# Patient Record
Sex: FEMALE | Race: WHITE | NOT HISPANIC OR LATINO | Employment: OTHER | URBAN - METROPOLITAN AREA
[De-identification: names, ages, dates, MRNs, and addresses within clinical notes are randomized per-mention and may not be internally consistent; named-entity substitution may affect disease eponyms.]

---

## 2017-01-03 ENCOUNTER — OFFICE VISIT (OUTPATIENT)
Dept: FAMILY MEDICINE | Facility: CLINIC | Age: 82
End: 2017-01-03
Payer: MEDICARE

## 2017-01-03 ENCOUNTER — LAB VISIT (OUTPATIENT)
Dept: LAB | Facility: HOSPITAL | Age: 82
End: 2017-01-03
Attending: FAMILY MEDICINE
Payer: MEDICARE

## 2017-01-03 VITALS
SYSTOLIC BLOOD PRESSURE: 136 MMHG | OXYGEN SATURATION: 95 % | BODY MASS INDEX: 20.52 KG/M2 | HEIGHT: 60 IN | WEIGHT: 104.5 LBS | HEART RATE: 68 BPM | DIASTOLIC BLOOD PRESSURE: 66 MMHG

## 2017-01-03 DIAGNOSIS — E78.5 DYSLIPIDEMIA: ICD-10-CM

## 2017-01-03 DIAGNOSIS — F03.90 DEMENTIA WITHOUT BEHAVIORAL DISTURBANCE, UNSPECIFIED DEMENTIA TYPE: ICD-10-CM

## 2017-01-03 DIAGNOSIS — E03.9 HYPOTHYROIDISM, UNSPECIFIED TYPE: ICD-10-CM

## 2017-01-03 DIAGNOSIS — Z23 NEED FOR INFLUENZA VACCINATION: ICD-10-CM

## 2017-01-03 DIAGNOSIS — F03.90 DEMENTIA WITHOUT BEHAVIORAL DISTURBANCE, UNSPECIFIED DEMENTIA TYPE: Primary | ICD-10-CM

## 2017-01-03 DIAGNOSIS — N39.0 URINARY TRACT INFECTION WITHOUT HEMATURIA, SITE UNSPECIFIED: ICD-10-CM

## 2017-01-03 DIAGNOSIS — Z23 NEED FOR VACCINATION AGAINST STREPTOCOCCUS PNEUMONIAE: ICD-10-CM

## 2017-01-03 LAB
ALBUMIN SERPL BCP-MCNC: 3.8 G/DL
ALP SERPL-CCNC: 59 U/L
ALT SERPL W/O P-5'-P-CCNC: 7 U/L
ANION GAP SERPL CALC-SCNC: 9 MMOL/L
AST SERPL-CCNC: 18 U/L
BILIRUB SERPL-MCNC: 0.5 MG/DL
BUN SERPL-MCNC: 18 MG/DL
CALCIUM SERPL-MCNC: 9.1 MG/DL
CHLORIDE SERPL-SCNC: 101 MMOL/L
CHOLEST/HDLC SERPL: 2.5 {RATIO}
CO2 SERPL-SCNC: 25 MMOL/L
CREAT SERPL-MCNC: 0.9 MG/DL
EST. GFR  (AFRICAN AMERICAN): >60 ML/MIN/1.73 M^2
EST. GFR  (NON AFRICAN AMERICAN): 54.9 ML/MIN/1.73 M^2
GLUCOSE SERPL-MCNC: 116 MG/DL
HDL/CHOLESTEROL RATIO: 40.5 %
HDLC SERPL-MCNC: 153 MG/DL
HDLC SERPL-MCNC: 62 MG/DL
LDLC SERPL CALC-MCNC: 71.6 MG/DL
NONHDLC SERPL-MCNC: 91 MG/DL
POTASSIUM SERPL-SCNC: 4.3 MMOL/L
PROT SERPL-MCNC: 7.7 G/DL
SODIUM SERPL-SCNC: 135 MMOL/L
TRIGL SERPL-MCNC: 97 MG/DL
TSH SERPL DL<=0.005 MIU/L-ACNC: 1.9 UIU/ML

## 2017-01-03 PROCEDURE — 84443 ASSAY THYROID STIM HORMONE: CPT

## 2017-01-03 PROCEDURE — 99999 PR PBB SHADOW E&M-EST. PATIENT-LVL III: CPT | Mod: PBBFAC,,, | Performed by: FAMILY MEDICINE

## 2017-01-03 PROCEDURE — 80061 LIPID PANEL: CPT

## 2017-01-03 PROCEDURE — 99214 OFFICE O/P EST MOD 30 MIN: CPT | Mod: S$PBB,,, | Performed by: FAMILY MEDICINE

## 2017-01-03 PROCEDURE — 80053 COMPREHEN METABOLIC PANEL: CPT

## 2017-01-03 PROCEDURE — 36415 COLL VENOUS BLD VENIPUNCTURE: CPT | Mod: PO

## 2017-01-03 RX ORDER — CIPROFLOXACIN 500 MG/1
500 TABLET ORAL 2 TIMES DAILY
Qty: 6 TABLET | Refills: 0 | Status: SHIPPED | OUTPATIENT
Start: 2017-01-03 | End: 2017-01-06

## 2017-01-03 NOTE — PROGRESS NOTES
Subjective:       Patient ID: Sue Capone is a 89 y.o. female.    Chief Complaint: Follow-up    HPI Comments: 89 years old female who came to the clinic for dementia follow-up.  Patient son denies behavioral symptoms.  Patient was recently evaluated at the emergency room secondary to a fall.  Patient is doing better.  Head CT scan was normal.  Urine with evidence of mild infection.  Patient due for her vaccinations.    Review of Systems   Constitutional: Negative.  Negative for chills and fever.   HENT: Negative.    Eyes: Negative.    Respiratory: Negative.    Cardiovascular: Negative.    Gastrointestinal: Negative.    Genitourinary: Negative.  Negative for difficulty urinating, dysuria, flank pain, hematuria and urgency.   Musculoskeletal: Negative.    Skin: Negative.    Neurological: Negative.    Psychiatric/Behavioral: Negative.        Objective:      Physical Exam   Constitutional: She is oriented to person, place, and time. She appears well-developed and well-nourished. No distress.   HENT:   Head: Normocephalic and atraumatic.   Right Ear: External ear normal.   Left Ear: External ear normal.   Nose: Nose normal.   Mouth/Throat: Oropharynx is clear and moist. No oropharyngeal exudate.   Eyes: Conjunctivae and EOM are normal. Pupils are equal, round, and reactive to light. Right eye exhibits no discharge. Left eye exhibits no discharge. No scleral icterus.   Neck: Normal range of motion. Neck supple. No JVD present. No tracheal deviation present. No thyromegaly present.   Cardiovascular: Normal rate, regular rhythm, normal heart sounds and intact distal pulses.  Exam reveals no gallop and no friction rub.    No murmur heard.  Pulmonary/Chest: Effort normal and breath sounds normal. No stridor. No respiratory distress. She has no wheezes. She has no rales. She exhibits no tenderness.   Abdominal: Soft. Bowel sounds are normal. She exhibits no distension and no mass. There is no tenderness. There is no rebound  and no guarding.   Musculoskeletal: Normal range of motion. She exhibits no edema or tenderness.   Lymphadenopathy:     She has no cervical adenopathy.   Neurological: She is alert and oriented to person, place, and time. She has normal reflexes. No cranial nerve deficit. She exhibits normal muscle tone. Coordination and gait abnormal.   Skin: Skin is warm and dry. No rash noted. She is not diaphoretic. No erythema. No pallor.   Psychiatric: She has a normal mood and affect. Judgment and thought content normal. Her mood appears not anxious. Her affect is not angry, not blunt, not labile and not inappropriate. She is slowed. Cognition and memory are impaired. She does not exhibit a depressed mood. She is noncommunicative. She exhibits abnormal recent memory. She exhibits normal remote memory.       Assessment:       1. Dementia without behavioral disturbance, unspecified dementia type    2. Urinary tract infection without hematuria, site unspecified    3. Hypothyroidism, unspecified type    4. Dyslipidemia    5. Need for influenza vaccination    6. Need for vaccination against Streptococcus pneumoniae        Plan:         Sue was seen today for follow-up.    Diagnoses and all orders for this visit:    Dementia without behavioral disturbance, unspecified dementia type  -     TSH; Future  -     Comprehensive metabolic panel; Future    Urinary tract infection without hematuria, site unspecified  -     ciprofloxacin HCl (CIPRO) 500 MG tablet; Take 1 tablet (500 mg total) by mouth 2 (two) times daily.    Hypothyroidism, unspecified type  -     TSH; Future  -     Comprehensive metabolic panel; Future  -     Lipid panel; Future    Dyslipidemia  -     TSH; Future  -     Comprehensive metabolic panel; Future  -     Lipid panel; Future    Need for influenza vaccination  -     Influenza - High Dose (65+) (PF) (IM)    Need for vaccination against Streptococcus pneumoniae  -     Pneumococcal Conjugate Vaccine (13 Valent)  (IM)

## 2017-01-03 NOTE — MR AVS SNAPSHOT
[facility]   [place]  [place] [address]  Phone: [phone]  Fax: [phone]                  Sue Capone   1/3/2017 9:00 AM   Office Visit    Description:  Female : 1922   Provider:  Collins Willams MD   Department:  [facility]           Reason for Visit     Follow-up           Diagnoses this Visit        Comments    Dementia without behavioral disturbance, unspecified dementia type    -  Primary     Urinary tract infection without hematuria, site unspecified         Hypothyroidism, unspecified type         Dyslipidemia         Need for influenza vaccination         Need for vaccination against Streptococcus pneumoniae                To Do List           Future Appointments        Provider Department Dept Phone    1/3/2017 9:45 AM LAB, [facility] [phone]    2017 11:00 AM Joey Benoit DPM [facility] [phone]      Goals (5 Years of Data)     None      Follow-Up and Disposition     Return in about 6 months (around 7/3/2017), or if symptoms worsen or fail to improve.       These Medications        Disp Refills Start End    ciprofloxacin HCl (CIPRO) 500 MG tablet 6 tablet 0 1/3/2017 2017    Take 1 tablet (500 mg total) by mouth 2 (two) times daily. - Oral    Pharmacy: [id number] [address] AT [facility] [address] Ph #: [phone]         [facility]     [facility] Nurse Care Line -  Assistance  Registered nurses in the [facility] provide clinical advisement, health education, appointment booking, and other advisory services.  Call for this free service at [phone].             Medications           Message regarding Medications     Verify the changes and/or additions to your medication regime listed below are the same as discussed with your clinician today.  If any of these changes or additions are incorrect,  please notify your healthcare provider.        START taking these NEW medications        Refills    ciprofloxacin HCl (CIPRO) 500 MG tablet 0    Sig: Take 1 tablet (500 mg total) by mouth 2 (two) times daily.    Class: Normal    Route: Oral           Verify that the below list of medications is an accurate representation of the medications you are currently taking.  If none reported, the list may be blank. If incorrect, please contact your healthcare provider. Carry this list with you in case of emergency.           Current Medications     aspirin (ECOTRIN) 81 MG EC tablet Take 81 mg by mouth once daily.    levothyroxine (SYNTHROID) 75 MCG tablet Take 1 tablet (75 mcg total) by mouth once daily.    pravastatin (PRAVACHOL) 20 MG tablet Take 1 tablet (20 mg total) by mouth once daily.    ciprofloxacin HCl (CIPRO) 500 MG tablet Take 1 tablet (500 mg total) by mouth 2 (two) times daily.           Clinical Reference Information           Vital Signs - Last Recorded  Most recent update: 1/3/2017  9:07 AM by Rosemary Brasher MA    BP Pulse Ht Wt SpO2 BMI    136/66 (BP Location: Left arm, Patient Position: Sitting, BP Method: Manual) 68 5' (1.524 m) 47.4 kg (104 lb 8 oz) 95% 20.41 kg/m2      Blood Pressure          Most Recent Value    BP  136/66      Allergies as of 1/3/2017     Codeine    Penicillins      Immunizations Administered on Date of Encounter - 1/3/2017     Name Date Dose VIS Date Route    Influenza - High Dose  Incomplete 0.5 mL 8/7/2015 Intramuscular    Pneumococcal Conjugate - 13 Valent  Incomplete 0.5 mL 11/5/2015 Intramuscular      Orders Placed During Today's Visit      Normal Orders This Visit    Influenza - High Dose (65+) (PF) (IM)     Pneumococcal Conjugate Vaccine (13 Valent) (IM)     Future Labs/Procedures Expected by Expires    Comprehensive metabolic panel  1/3/2017 4/3/2017    Lipid panel  1/3/2017 4/3/2017    TSH  1/3/2017 4/3/2017

## 2017-01-09 ENCOUNTER — OFFICE VISIT (OUTPATIENT)
Dept: FAMILY MEDICINE | Facility: CLINIC | Age: 82
End: 2017-01-09
Payer: MEDICARE

## 2017-01-09 ENCOUNTER — OFFICE VISIT (OUTPATIENT)
Dept: PODIATRY | Facility: CLINIC | Age: 82
End: 2017-01-09
Payer: MEDICARE

## 2017-01-09 VITALS
BODY MASS INDEX: 20.42 KG/M2 | HEART RATE: 60 BPM | HEIGHT: 60 IN | WEIGHT: 104 LBS | SYSTOLIC BLOOD PRESSURE: 154 MMHG | DIASTOLIC BLOOD PRESSURE: 64 MMHG

## 2017-01-09 VITALS
WEIGHT: 104 LBS | HEIGHT: 60 IN | HEART RATE: 70 BPM | BODY MASS INDEX: 20.42 KG/M2 | SYSTOLIC BLOOD PRESSURE: 125 MMHG | DIASTOLIC BLOOD PRESSURE: 58 MMHG

## 2017-01-09 DIAGNOSIS — F03.90 DEMENTIA WITHOUT BEHAVIORAL DISTURBANCE, UNSPECIFIED DEMENTIA TYPE: Primary | ICD-10-CM

## 2017-01-09 DIAGNOSIS — B35.1 ONYCHOMYCOSIS: ICD-10-CM

## 2017-01-09 DIAGNOSIS — N39.0 URINARY TRACT INFECTION WITHOUT HEMATURIA, SITE UNSPECIFIED: ICD-10-CM

## 2017-01-09 DIAGNOSIS — J40 BRONCHITIS: ICD-10-CM

## 2017-01-09 DIAGNOSIS — F03.90 DEMENTIA WITHOUT BEHAVIORAL DISTURBANCE, UNSPECIFIED DEMENTIA TYPE: ICD-10-CM

## 2017-01-09 DIAGNOSIS — I73.9 PAD (PERIPHERAL ARTERY DISEASE): Primary | ICD-10-CM

## 2017-01-09 PROCEDURE — 99213 OFFICE O/P EST LOW 20 MIN: CPT | Mod: PBBFAC,PO | Performed by: FAMILY MEDICINE

## 2017-01-09 PROCEDURE — 11721 DEBRIDE NAIL 6 OR MORE: CPT | Mod: S$PBB,Q8,, | Performed by: PODIATRIST

## 2017-01-09 PROCEDURE — 87086 URINE CULTURE/COLONY COUNT: CPT

## 2017-01-09 PROCEDURE — 99499 UNLISTED E&M SERVICE: CPT | Mod: S$PBB,,, | Performed by: PODIATRIST

## 2017-01-09 PROCEDURE — 99214 OFFICE O/P EST MOD 30 MIN: CPT | Mod: S$PBB,,, | Performed by: FAMILY MEDICINE

## 2017-01-09 PROCEDURE — 81001 URINALYSIS AUTO W/SCOPE: CPT

## 2017-01-09 PROCEDURE — 99999 PR PBB SHADOW E&M-EST. PATIENT-LVL II: CPT | Mod: PBBFAC,,, | Performed by: PODIATRIST

## 2017-01-09 PROCEDURE — 99999 PR PBB SHADOW E&M-EST. PATIENT-LVL III: CPT | Mod: PBBFAC,,, | Performed by: FAMILY MEDICINE

## 2017-01-09 RX ORDER — LEVOFLOXACIN 250 MG/1
250 TABLET ORAL DAILY
Qty: 10 TABLET | Refills: 0 | Status: SHIPPED | OUTPATIENT
Start: 2017-01-09 | End: 2017-01-10

## 2017-01-09 NOTE — MR AVS SNAPSHOT
[facility]   [place]  [place] [address]  Phone: [phone]  Fax: [phone]                  Sue Capone   2017 6:00 PM   Office Visit    Description:  Female : 1922   Provider:  Collins Willams MD   Department:  [facility]           Reason for Visit     Urinary Tract Infection           Diagnoses this Visit        Comments    Dementia without behavioral disturbance, unspecified dementia type    -  Primary     Urinary tract infection without hematuria, site unspecified         Bronchitis                To Do List           Future Appointments        Provider Department Dept Phone    1/10/2017 11:00 AM K[facility] XR1 300 LB LIMIT [facility] [phone]    1/10/2017 11:30 AM LAB, [facility] [phone]    4/10/2017 11:00 AM Joey Benoit, [facility] [phone]      Goals (5 Years of Data)     None       These Medications        Disp Refills Start End    levoFLOXacin (LEVAQUIN) 250 MG tablet 10 tablet 0 2017    Take 1 tablet (250 mg total) by mouth once daily. - Oral    Pharmacy: [facility] [address] [place] [address] [address] AT [facility] Ph #: [phone]         [facility]     [facility] - / Assistance  Registered nurses in the [facility] provide clinical advisement, health education, appointment booking, and other advisory services.  Call for this free service at [phone].             Medications           Message regarding Medications     Verify the changes and/or additions to your medication regime listed below are the same as discussed with your clinician today.  If any of these changes or additions are incorrect, please notify your healthcare provider.        START taking these NEW medications        Refills    levoFLOXacin (LEVAQUIN) 250 MG tablet 0    Sig: Take 1 tablet (250 mg  total) by mouth once daily.    Class: Normal    Route: Oral           Verify that the below list of medications is an accurate representation of the medications you are currently taking.  If none reported, the list may be blank. If incorrect, please contact your healthcare provider. Carry this list with you in case of emergency.           Current Medications     aspirin (ECOTRIN) 81 MG EC tablet Take 81 mg by mouth once daily.    levothyroxine (SYNTHROID) 75 MCG tablet Take 1 tablet (75 mcg total) by mouth once daily.    pravastatin (PRAVACHOL) 20 MG tablet Take 1 tablet (20 mg total) by mouth once daily.    levoFLOXacin (LEVAQUIN) 250 MG tablet Take 1 tablet (250 mg total) by mouth once daily.           Clinical Reference Information           Vital Signs - Last Recorded  Most recent update: 1/9/2017  5:05 PM by Faith Worthington MA    BP Pulse Ht Wt BMI    (!) 154/64 (BP Location: Left arm, Patient Position: Sitting, BP Method: Manual) 60 5' (1.524 m) 47.2 kg (104 lb) 20.31 kg/m2      Blood Pressure          Most Recent Value    BP  (!)  154/64      Allergies as of 1/9/2017     Codeine    Penicillins      Immunizations Administered on Date of Encounter - 1/9/2017     None      Orders Placed During Today's Visit      Normal Orders This Visit    Urinalysis     Urine culture     Future Labs/Procedures Expected by Expires    Basic metabolic panel  1/9/2017 4/9/2017    CBC auto differential  1/9/2017 1/9/2018    X-Ray Chest PA And Lateral  1/9/2017 1/9/2018

## 2017-01-09 NOTE — PROGRESS NOTES
Subjective:       Patient ID: Sue Capone is a 89 y.o. female.    Chief Complaint: Urinary Tract Infection    HPI Comments: 89 years old female who came to the clinic with possible urinary tract infection.  Patient with impaired mental status or worsening of her dementia for the last week.  Patient was sleeping most of the day.  No urinary symptoms.  No fever or chills.    Urinary Tract Infection    Pertinent negatives include no chills, flank pain, hematuria or urgency.     Review of Systems   Constitutional: Negative.  Negative for chills and fever.   HENT: Negative.    Eyes: Negative.    Respiratory: Positive for cough.    Cardiovascular: Negative.    Gastrointestinal: Negative.    Genitourinary: Negative.  Negative for dysuria, flank pain, hematuria and urgency.   Musculoskeletal: Negative.    Skin: Negative.    Neurological: Negative.    Psychiatric/Behavioral: Positive for decreased concentration.       Objective:      Physical Exam   Constitutional: She is oriented to person, place, and time. She appears well-developed and well-nourished. No distress.   HENT:   Head: Normocephalic and atraumatic.   Right Ear: External ear normal.   Left Ear: External ear normal.   Nose: Nose normal.   Mouth/Throat: Oropharynx is clear and moist. No oropharyngeal exudate.   Eyes: Conjunctivae and EOM are normal. Pupils are equal, round, and reactive to light. Right eye exhibits no discharge. Left eye exhibits no discharge. No scleral icterus.   Neck: Normal range of motion. Neck supple. No JVD present. No tracheal deviation present. No thyromegaly present.   Cardiovascular: Normal rate, regular rhythm, normal heart sounds and intact distal pulses.  Exam reveals no gallop and no friction rub.    No murmur heard.  Pulmonary/Chest: Effort normal. No stridor. No respiratory distress. She has no wheezes. She has rhonchi in the right middle field. She has no rales. She exhibits no tenderness.   Abdominal: Soft. Bowel sounds are  normal. She exhibits no distension and no mass. There is no tenderness. There is no rebound and no guarding.   Musculoskeletal: Normal range of motion. She exhibits no edema or tenderness.   Lymphadenopathy:     She has no cervical adenopathy.   Neurological: She is alert and oriented to person, place, and time. She has normal reflexes. No cranial nerve deficit. She exhibits normal muscle tone. Coordination and gait abnormal.   Skin: Skin is warm and dry. No rash noted. She is not diaphoretic. No erythema. No pallor.   Psychiatric: She has a normal mood and affect. Judgment and thought content normal. She is slowed. Cognition and memory are impaired. She is noncommunicative. She exhibits abnormal recent memory. She exhibits normal remote memory.       Assessment:       1. Dementia without behavioral disturbance, unspecified dementia type    2. Urinary tract infection without hematuria, site unspecified    3. Bronchitis        Plan:         Sue was seen today for urinary tract infection.    Diagnoses and all orders for this visit:    Dementia without behavioral disturbance, unspecified dementia type  -     Basic metabolic panel; Future  -     CBC auto differential; Future    Urinary tract infection without hematuria, site unspecified  -     X-Ray Chest PA And Lateral; Future  -     levoFLOXacin (LEVAQUIN) 250 MG tablet; Take 1 tablet (250 mg total) by mouth once daily.  -     Urinalysis  -     Urine culture  -     Basic metabolic panel; Future  -     CBC auto differential; Future    Bronchitis  -     X-Ray Chest PA And Lateral; Future  -     Basic metabolic panel; Future  -     CBC auto differential; Future

## 2017-01-09 NOTE — MR AVS SNAPSHOT
[facility]   [place]  [place] [address]  Phone: [phone]                  Sue Capone   2017 11:00 AM   Office Visit    Description:  Female : 1922   Provider:  Joey Benoit DPM   Department:  [facility]           Reason for Visit     Nail Care           Diagnoses this Visit        Comments    PAD (peripheral artery disease)    -  Primary     Onychomycosis         Dementia without behavioral disturbance, unspecified dementia type                To Do List           Future Appointments        Provider Department Dept Phone    2017 6:00 PM Collins Willams MD [facility] [phone]    4/10/2017 11:00 AM Joey Benoit DPM [facility] [phone]      Goals (5 Years of Data)     None      Follow-Up and Disposition     Return in about 3 months (around 2017).      [facility] On [facility]     [facility] Nurse Care Line -  Assistance  Registered nurses in the [facility] provide clinical advisement, health education, appointment booking, and other advisory services.  Call for this free service at [phone].             Medications           Message regarding Medications     Verify the changes and/or additions to your medication regime listed below are the same as discussed with your clinician today.  If any of these changes or additions are incorrect, please notify your healthcare provider.             Verify that the below list of medications is an accurate representation of the medications you are currently taking.  If none reported, the list may be blank. If incorrect, please contact your healthcare provider. Carry this list with you in case of emergency.           Current Medications     aspirin (ECOTRIN) 81 MG EC tablet Take 81 mg by mouth once daily.    levothyroxine (SYNTHROID) 75 MCG tablet Take 1 tablet (75 mcg total) by mouth once daily.    pravastatin (PRAVACHOL) 20 MG tablet Take 1 tablet  (20 mg total) by mouth once daily.           Clinical Reference Information           Vital Signs - Last Recorded  Most recent update: 1/9/2017 11:00 AM by Grazyna Alberto MA    BP Pulse Ht Wt BMI    (!) 125/58 70 5' (1.524 m) 47.2 kg (104 lb) 20.31 kg/m2      Blood Pressure          Most Recent Value    BP  (!)  125/58      Allergies as of 1/9/2017     Codeine    Penicillins      Immunizations Administered on Date of Encounter - 1/9/2017     None

## 2017-01-10 ENCOUNTER — HOSPITAL ENCOUNTER (OUTPATIENT)
Dept: RADIOLOGY | Facility: HOSPITAL | Age: 82
Discharge: HOME | End: 2017-01-10
Attending: FAMILY MEDICINE
Payer: MEDICARE

## 2017-01-10 ENCOUNTER — TELEPHONE (OUTPATIENT)
Dept: INTERNAL MEDICINE | Facility: CLINIC | Age: 82
End: 2017-01-10

## 2017-01-10 DIAGNOSIS — J40 BRONCHITIS: ICD-10-CM

## 2017-01-10 DIAGNOSIS — N39.0 URINARY TRACT INFECTION WITHOUT HEMATURIA, SITE UNSPECIFIED: ICD-10-CM

## 2017-01-10 LAB
BACTERIA #/AREA URNS AUTO: ABNORMAL /HPF
BILIRUB UR QL STRIP: NEGATIVE
CAOX CRY UR QL COMP ASSIST: ABNORMAL
CLARITY UR REFRACT.AUTO: ABNORMAL
COLOR UR AUTO: YELLOW
GLUCOSE UR QL STRIP: NEGATIVE
HGB UR QL STRIP: NEGATIVE
KETONES UR QL STRIP: NEGATIVE
LEUKOCYTE ESTERASE UR QL STRIP: ABNORMAL
MICROSCOPIC COMMENT: ABNORMAL
NITRITE UR QL STRIP: NEGATIVE
NON-SQ EPI CELLS #/AREA URNS AUTO: 2 /HPF
PH UR STRIP: 6 [PH] (ref 5–8)
PROT UR QL STRIP: ABNORMAL
SP GR UR STRIP: 1.02 (ref 1–1.03)
SQUAMOUS #/AREA URNS AUTO: 2 /HPF
URN SPEC COLLECT METH UR: ABNORMAL
UROBILINOGEN UR STRIP-ACNC: NEGATIVE EU/DL
WBC #/AREA URNS AUTO: 5 /HPF (ref 0–5)

## 2017-01-10 PROCEDURE — 71010 XR CHEST 1 VIEW: CPT | Mod: 26,,, | Performed by: RADIOLOGY

## 2017-01-10 PROCEDURE — 71010 XR CHEST 1 VIEW: CPT | Mod: TC,PO

## 2017-01-10 NOTE — TELEPHONE ENCOUNTER
Chest x-ray with possible pneumonia versus CONGESTIVE heart failure.  I recommend evaluation at the emergency room today for possible treatment for congestive heart failure.  Please notify her Son.

## 2017-01-10 NOTE — PROGRESS NOTES
Subjective:      Patient ID: Sue Capone is a 89 y.o. female.    Chief Complaint: Nail Care    Patient who is hard of hearing with dementia presents to clinic with the complaint of toenails that are in need of trimming.  Accompanied by son who relates all pertinent information. No new complaints. No longer taking Metformin and is not considered diabetic any longer. No new complaints. History of a fall 11/28/16 treated at [facility] ED with a boot. Xray reviewed fracture of right second and third toes. Questionable right ankle injury assessed per [facility] and found to not require orthopedic boot.    Vitals:    01/09/17 1058   BP: (!) 125/58   Pulse: 70     Past Medical History   Diagnosis Date    Cancer     Diabetes mellitus type II     Hypertension     Thyroid disease        Past Surgical History   Procedure Laterality Date    Hysterectomy      Breast surgery      Shoulder surgery       left    Cholecystectomy         No family history on file.    Social History     Social History    Marital status:      Spouse name: N/A    Number of children: N/A    Years of education: N/A     Social History Main Topics    Smoking status: Former Smoker    Smokeless tobacco: Never Used    Alcohol use No    Drug use: Not on file    Sexual activity: Not on file     Other Topics Concern    Not on file     Social History Narrative       Current Outpatient Prescriptions   Medication Sig Dispense Refill    aspirin (ECOTRIN) 81 MG EC tablet Take 81 mg by mouth once daily.      levothyroxine (SYNTHROID) 75 MCG tablet Take 1 tablet (75 mcg total) by mouth once daily. 30 tablet 11    pravastatin (PRAVACHOL) 20 MG tablet Take 1 tablet (20 mg total) by mouth once daily. 30 tablet 11    levoFLOXacin (LEVAQUIN) 250 MG tablet Take 1 tablet (250 mg total) by mouth once daily. 10 tablet 0     No current facility-administered medications for this visit.        Review of patient's allergies indicates:   Allergen  Reactions    Codeine     Penicillins        Review of Systems   Unable to perform ROS: dementia   HENT: Positive for hearing loss.    Skin: Positive for nail changes.   Psychiatric/Behavioral: Positive for memory loss.           Objective:      Physical Exam   Constitutional: No distress.   Cardiovascular: Intact distal pulses.    Pulses:       Dorsalis pedis pulses are 1+ on the right side, and 1+ on the left side.        Posterior tibial pulses are 1+ on the right side, and 1+ on the left side.   CFT< 3 secs all toes bilateral foot, skin temp warm to cool bilateral foot, no digital hair growth bilateral foot, mild non-pitting lower extremity edema with varicosities bilateral.       Musculoskeletal:   Semi-reducible hammertoe deformities toes 2-5 bilateral foot. Pes planus foot type with hypermobile 1st ray b/l.    Neurological: She is alert.   Skin: Skin is warm, dry and intact. No abrasion, no ecchymosis and no rash noted. She is not diaphoretic. No cyanosis or erythema. No pallor. Nails show no clubbing.   Nails 1-5 bilateral are elongated, yellow and thickened. Toenails 3-5 left are also dystrophic with subungal debris. Rubor on dependency of both feet.    Skin is dry bilateral foot.    No open lesions or macerations bilateral lower extremity.               Assessment:       Encounter Diagnoses   Name Primary?    PAD (peripheral artery disease) Yes    Onychomycosis     Dementia without behavioral disturbance, unspecified dementia type          Plan:       Sue was seen today for nail care.    Diagnoses and all orders for this visit:    PAD (peripheral artery disease)    Onychomycosis    Dementia without behavioral disturbance, unspecified dementia type      I counseled the patient on her conditions, their implications and medical management.    Shoe inspection. Patient instructed on proper foot hygeine. We discussed wearing proper shoe gear, daily foot inspections, never walking without protective shoe  gear, never putting sharp instruments to feet, routine podiatric nail visits every 2-3 months.      With patient's permission, nails were aggressively reduced and debrided x 10 to their soft tissue attachment mechanically and with electric , removing all offending nail and debris. Patient relates relief following the procedure. She will continue to monitor the areas daily, inspect her feet, wear protective shoe gear when ambulatory, moisturizer to maintain skin integrity and follow in this office in approximately 2-3 months, sooner p.r.n.    Continue comfortable shoes. Currently wearing SAS which are acceptable.     RTC 3-4 months or prn.

## 2017-01-10 NOTE — TELEPHONE ENCOUNTER
Son informed moms cxr shows possible pneumonia vs congestive heart failure, dr zee recommends him bring her to er for evaluation and treatment, verb understanding

## 2017-01-11 ENCOUNTER — HOSPITAL ENCOUNTER (INPATIENT)
Facility: HOSPITAL | Age: 82
LOS: 12 days | Discharge: HOSPICE | DRG: 100 | End: 2017-01-23
Attending: EMERGENCY MEDICINE | Admitting: INTERNAL MEDICINE
Payer: MEDICARE

## 2017-01-11 DIAGNOSIS — R56.9 SEIZURE: ICD-10-CM

## 2017-01-11 DIAGNOSIS — I48.0 PAROXYSMAL ATRIAL FIBRILLATION: ICD-10-CM

## 2017-01-11 DIAGNOSIS — R41.82 ALTERED MENTAL STATUS, UNSPECIFIED ALTERED MENTAL STATUS TYPE: ICD-10-CM

## 2017-01-11 DIAGNOSIS — J69.0 ASPIRATION PNEUMONIA OF LEFT LOWER LOBE DUE TO GASTRIC SECRETIONS: ICD-10-CM

## 2017-01-11 DIAGNOSIS — E87.1 HYPONATREMIA: ICD-10-CM

## 2017-01-11 DIAGNOSIS — G40.319 GENERALIZED CONVULSIVE EPILEPSY WITH INTRACTABLE EPILEPSY: ICD-10-CM

## 2017-01-11 DIAGNOSIS — F03.90 DEMENTIA WITHOUT BEHAVIORAL DISTURBANCE, UNSPECIFIED DEMENTIA TYPE: ICD-10-CM

## 2017-01-11 DIAGNOSIS — R56.9 SEIZURES: Primary | ICD-10-CM

## 2017-01-11 DIAGNOSIS — J69.0 ASPIRATION PNEUMONIA OF LEFT LOWER LOBE, UNSPECIFIED ASPIRATION PNEUMONIA TYPE: ICD-10-CM

## 2017-01-11 DIAGNOSIS — J18.9 CAP (COMMUNITY ACQUIRED PNEUMONIA): ICD-10-CM

## 2017-01-11 DIAGNOSIS — Z86.39 HISTORY OF DIABETES MELLITUS, TYPE II: ICD-10-CM

## 2017-01-11 LAB
ALBUMIN SERPL BCP-MCNC: 3.6 G/DL
ALP SERPL-CCNC: 59 U/L
ALT SERPL W/O P-5'-P-CCNC: 13 U/L
ANION GAP SERPL CALC-SCNC: 10 MMOL/L
AST SERPL-CCNC: 28 U/L
BACTERIA UR CULT: NORMAL
BASOPHILS # BLD AUTO: 0.05 K/UL
BASOPHILS NFR BLD: 0.3 %
BILIRUB SERPL-MCNC: 0.6 MG/DL
BNP SERPL-MCNC: 227 PG/ML
BUN SERPL-MCNC: 19 MG/DL
CALCIUM SERPL-MCNC: 8.5 MG/DL
CHLORIDE SERPL-SCNC: 103 MMOL/L
CK SERPL-CCNC: 179 U/L
CLARITY CSF: CLEAR
CO2 SERPL-SCNC: 20 MMOL/L
COLOR CSF: COLORLESS
CREAT SERPL-MCNC: 0.8 MG/DL
DIFFERENTIAL METHOD: ABNORMAL
EOSINOPHIL # BLD AUTO: 0.1 K/UL
EOSINOPHIL NFR BLD: 0.7 %
EOSINOPHIL NFR CSF MANUAL: 2 %
ERYTHROCYTE [DISTWIDTH] IN BLOOD BY AUTOMATED COUNT: 14.8 %
EST. GFR  (AFRICAN AMERICAN): >60 ML/MIN/1.73 M^2
EST. GFR  (NON AFRICAN AMERICAN): >60 ML/MIN/1.73 M^2
FERRITIN SERPL-MCNC: 115 NG/ML
GLUCOSE CSF-MCNC: 69 MG/DL
GLUCOSE CSF-MCNC: 69 MG/DL
GLUCOSE SERPL-MCNC: 122 MG/DL
HCT VFR BLD AUTO: 33.5 %
HGB BLD-MCNC: 11.2 G/DL
LYMPHOCYTES # BLD AUTO: 2.4 K/UL
LYMPHOCYTES NFR BLD: 16 %
LYMPHOCYTES NFR CSF MANUAL: 53 %
MAGNESIUM SERPL-MCNC: 1.9 MG/DL
MCH RBC QN AUTO: 27.7 PG
MCHC RBC AUTO-ENTMCNC: 33.4 %
MCV RBC AUTO: 83 FL
MONOCYTES # BLD AUTO: 1.4 K/UL
MONOCYTES NFR BLD: 8.9 %
MONOS+MACROS NFR CSF MANUAL: 3 %
NEUTROPHILS # BLD AUTO: 11.2 K/UL
NEUTROPHILS NFR BLD: 73.7 %
NEUTROPHILS NFR CSF MANUAL: 42 %
PLATELET # BLD AUTO: 354 K/UL
PMV BLD AUTO: 10.7 FL
POCT GLUCOSE: 145 MG/DL (ref 70–110)
POTASSIUM SERPL-SCNC: 4 MMOL/L
PROLACTIN SERPL IA-MCNC: 29.4 NG/ML
PROT CSF-MCNC: 53 MG/DL
PROT CSF-MCNC: 53 MG/DL
PROT SERPL-MCNC: 6.8 G/DL
RBC # BLD AUTO: 4.04 M/UL
RBC # CSF: 221 /CU MM
SODIUM SERPL-SCNC: 133 MMOL/L
SPECIMEN VOL CSF: 3 ML
T4 FREE SERPL-MCNC: 1.18 NG/DL
TROPONIN I SERPL DL<=0.01 NG/ML-MCNC: 0.01 NG/ML
TSH SERPL DL<=0.005 MIU/L-ACNC: 2.22 UIU/ML
WBC # BLD AUTO: 15.2 K/UL
WBC # CSF: 6 /CU MM

## 2017-01-11 PROCEDURE — 89051 BODY FLUID CELL COUNT: CPT

## 2017-01-11 PROCEDURE — 83540 ASSAY OF IRON: CPT

## 2017-01-11 PROCEDURE — 87205 SMEAR GRAM STAIN: CPT | Mod: 91

## 2017-01-11 PROCEDURE — 84146 ASSAY OF PROLACTIN: CPT

## 2017-01-11 PROCEDURE — 36415 COLL VENOUS BLD VENIPUNCTURE: CPT

## 2017-01-11 PROCEDURE — 82550 ASSAY OF CK (CPK): CPT

## 2017-01-11 PROCEDURE — 11000001 HC ACUTE MED/SURG PRIVATE ROOM

## 2017-01-11 PROCEDURE — 96376 TX/PRO/DX INJ SAME DRUG ADON: CPT

## 2017-01-11 PROCEDURE — 25000003 PHARM REV CODE 250: Performed by: INTERNAL MEDICINE

## 2017-01-11 PROCEDURE — 99000 SPECIMEN HANDLING OFFICE-LAB: CPT

## 2017-01-11 PROCEDURE — 84443 ASSAY THYROID STIM HORMONE: CPT

## 2017-01-11 PROCEDURE — 82728 ASSAY OF FERRITIN: CPT

## 2017-01-11 PROCEDURE — 82746 ASSAY OF FOLIC ACID SERUM: CPT

## 2017-01-11 PROCEDURE — 84157 ASSAY OF PROTEIN OTHER: CPT | Mod: 91

## 2017-01-11 PROCEDURE — 63600175 PHARM REV CODE 636 W HCPCS: Performed by: EMERGENCY MEDICINE

## 2017-01-11 PROCEDURE — 80053 COMPREHEN METABOLIC PANEL: CPT

## 2017-01-11 PROCEDURE — 82607 VITAMIN B-12: CPT

## 2017-01-11 PROCEDURE — 99285 EMERGENCY DEPT VISIT HI MDM: CPT | Mod: 25

## 2017-01-11 PROCEDURE — 96361 HYDRATE IV INFUSION ADD-ON: CPT

## 2017-01-11 PROCEDURE — 63600175 PHARM REV CODE 636 W HCPCS: Performed by: INTERNAL MEDICINE

## 2017-01-11 PROCEDURE — 84439 ASSAY OF FREE THYROXINE: CPT

## 2017-01-11 PROCEDURE — 83735 ASSAY OF MAGNESIUM: CPT

## 2017-01-11 PROCEDURE — 87040 BLOOD CULTURE FOR BACTERIA: CPT

## 2017-01-11 PROCEDURE — 96365 THER/PROPH/DIAG IV INF INIT: CPT

## 2017-01-11 PROCEDURE — 009U3ZX DRAINAGE OF SPINAL CANAL, PERCUTANEOUS APPROACH, DIAGNOSTIC: ICD-10-PCS | Performed by: EMERGENCY MEDICINE

## 2017-01-11 PROCEDURE — 25000003 PHARM REV CODE 250: Performed by: EMERGENCY MEDICINE

## 2017-01-11 PROCEDURE — 87070 CULTURE OTHR SPECIMN AEROBIC: CPT

## 2017-01-11 PROCEDURE — 84145 PROCALCITONIN (PCT): CPT

## 2017-01-11 PROCEDURE — 82945 GLUCOSE OTHER FLUID: CPT | Mod: 91

## 2017-01-11 PROCEDURE — 85025 COMPLETE CBC W/AUTO DIFF WBC: CPT

## 2017-01-11 PROCEDURE — 96375 TX/PRO/DX INJ NEW DRUG ADDON: CPT

## 2017-01-11 PROCEDURE — 84157 ASSAY OF PROTEIN OTHER: CPT

## 2017-01-11 PROCEDURE — 82945 GLUCOSE OTHER FLUID: CPT

## 2017-01-11 PROCEDURE — 82962 GLUCOSE BLOOD TEST: CPT

## 2017-01-11 PROCEDURE — 83880 ASSAY OF NATRIURETIC PEPTIDE: CPT

## 2017-01-11 PROCEDURE — 62270 DX LMBR SPI PNXR: CPT

## 2017-01-11 PROCEDURE — 87529 HSV DNA AMP PROBE: CPT

## 2017-01-11 PROCEDURE — 84484 ASSAY OF TROPONIN QUANT: CPT

## 2017-01-11 RX ORDER — LORAZEPAM 2 MG/ML
1 INJECTION INTRAMUSCULAR
Status: COMPLETED | OUTPATIENT
Start: 2017-01-11 | End: 2017-01-11

## 2017-01-11 RX ORDER — POLYETHYLENE GLYCOL 3350 17 G/17G
17 POWDER, FOR SOLUTION ORAL DAILY
Status: DISCONTINUED | OUTPATIENT
Start: 2017-01-12 | End: 2017-01-23 | Stop reason: HOSPADM

## 2017-01-11 RX ORDER — SODIUM CHLORIDE 9 MG/ML
1000 INJECTION, SOLUTION INTRAVENOUS
Status: COMPLETED | OUTPATIENT
Start: 2017-01-11 | End: 2017-01-11

## 2017-01-11 RX ORDER — PRAVASTATIN SODIUM 20 MG/1
20 TABLET ORAL DAILY
Status: DISCONTINUED | OUTPATIENT
Start: 2017-01-12 | End: 2017-01-23 | Stop reason: HOSPADM

## 2017-01-11 RX ORDER — ENOXAPARIN SODIUM 100 MG/ML
40 INJECTION SUBCUTANEOUS EVERY 24 HOURS
Status: DISCONTINUED | OUTPATIENT
Start: 2017-01-12 | End: 2017-01-23 | Stop reason: HOSPADM

## 2017-01-11 RX ORDER — DOCUSATE SODIUM 100 MG/1
100 CAPSULE, LIQUID FILLED ORAL 2 TIMES DAILY
Status: DISCONTINUED | OUTPATIENT
Start: 2017-01-11 | End: 2017-01-23 | Stop reason: HOSPADM

## 2017-01-11 RX ORDER — LEVETIRACETAM 10 MG/ML
1000 INJECTION INTRAVASCULAR EVERY 12 HOURS
Status: DISCONTINUED | OUTPATIENT
Start: 2017-01-11 | End: 2017-01-13

## 2017-01-11 RX ORDER — SODIUM CHLORIDE 0.9 % (FLUSH) 0.9 %
3 SYRINGE (ML) INJECTION EVERY 8 HOURS
Status: DISCONTINUED | OUTPATIENT
Start: 2017-01-11 | End: 2017-01-23 | Stop reason: HOSPADM

## 2017-01-11 RX ORDER — LEVOTHYROXINE SODIUM 75 UG/1
75 TABLET ORAL DAILY
Status: DISCONTINUED | OUTPATIENT
Start: 2017-01-12 | End: 2017-01-23 | Stop reason: HOSPADM

## 2017-01-11 RX ORDER — ASPIRIN 81 MG/1
81 TABLET ORAL DAILY
Status: DISCONTINUED | OUTPATIENT
Start: 2017-01-12 | End: 2017-01-21

## 2017-01-11 RX ORDER — LORAZEPAM 2 MG/ML
INJECTION INTRAMUSCULAR
Status: DISPENSED
Start: 2017-01-11 | End: 2017-01-12

## 2017-01-11 RX ADMIN — LORAZEPAM 1 MG: 2 INJECTION INTRAMUSCULAR; INTRAVENOUS at 05:01

## 2017-01-11 RX ADMIN — SODIUM CHLORIDE 1000 ML: 0.9 INJECTION, SOLUTION INTRAVENOUS at 05:01

## 2017-01-11 RX ADMIN — LORAZEPAM 1 MG: 2 INJECTION INTRAMUSCULAR; INTRAVENOUS at 08:01

## 2017-01-11 RX ADMIN — AZTREONAM 2000 MG: 2 INJECTION, POWDER, LYOPHILIZED, FOR SOLUTION INTRAMUSCULAR; INTRAVENOUS at 09:01

## 2017-01-11 RX ADMIN — SODIUM CHLORIDE, PRESERVATIVE FREE 3 ML: 5 INJECTION INTRAVENOUS at 10:01

## 2017-01-11 RX ADMIN — LEVETIRACETAM 1000 MG: 10 INJECTION INTRAVENOUS at 11:01

## 2017-01-11 NOTE — ED PROVIDER NOTES
Encounter Date: 1/11/2017       History     Chief Complaint   Patient presents with    Seizures     witnessed seizure by family, seen here yesterday for pneumonia     Review of patient's allergies indicates:   Allergen Reactions    Codeine     Penicillins      The history is provided by a relative.        89 y/o WF with history of HTN, DM, thyroid disease, was seen here yesterday and diagnosed with pneumonia. She was discharged on Levaquin 750 mg PO daily. Today the son reports that she was been confused on and off. She had an episode when she was sitting in a chair and just started screaming and drooling saliva. She then got very weak and had decreased responsiveness ( she was just staring). He states the spell was like a seizure. She has no history of seizures. She had a fall and injured her head in November and was seen and had a negative CT head. No vomiting. The patient is alert and cooperative but very hard of hearing and cannot contribute to her history.   Past Medical History   Diagnosis Date    Cancer     Diabetes mellitus type II     Hypertension     Thyroid disease      No past medical history pertinent negatives.  Past Surgical History   Procedure Laterality Date    Hysterectomy      Breast surgery      Shoulder surgery       left    Cholecystectomy       History reviewed. No pertinent family history.  Social History   Substance Use Topics    Smoking status: Former Smoker    Smokeless tobacco: Never Used    Alcohol use No     Review of Systems   Constitutional: Negative for fever.   HENT: Negative for sore throat.    Eyes: Negative for pain.   Respiratory: Negative for cough and shortness of breath.    Cardiovascular: Negative for chest pain and palpitations.   Gastrointestinal: Negative for abdominal pain, diarrhea, nausea and vomiting.   Genitourinary: Negative for dysuria and hematuria.   Musculoskeletal: Negative for arthralgias, back pain and neck pain.   Skin: Negative for rash.    Neurological: Positive for syncope. Negative for dizziness, seizures and headaches.   Hematological:        No bleeding   Psychiatric/Behavioral: Positive for confusion.   All other systems reviewed and are negative.      Physical Exam   Initial Vitals   BP Pulse Resp Temp SpO2   01/11/17 1635 01/11/17 1635 01/11/17 1635 01/11/17 1635 01/11/17 1635   158/74 90 18 98.2 °F (36.8 °C) 95 %     Physical Exam    Nursing note and vitals reviewed.  Constitutional: She appears well-developed and well-nourished.   HENT:   Head: Normocephalic and atraumatic.   Right Ear: External ear normal.   Left Ear: External ear normal.   Nose: Nose normal.   Mouth/Throat: Oropharynx is clear and moist.   Upper denture; lower partial   Eyes: Conjunctivae and EOM are normal. Pupils are equal, round, and reactive to light.   Neck: Normal range of motion. No JVD present.   Cardiovascular: Normal rate, regular rhythm and normal heart sounds. Exam reveals no friction rub.    No murmur heard.  Pulmonary/Chest: Breath sounds normal. She has no wheezes. She has no rhonchi. She has no rales. She exhibits no tenderness.   Abdominal: Soft. Bowel sounds are normal. There is no tenderness. There is no rebound and no guarding.   Musculoskeletal: Normal range of motion.   Neurological: She is alert. She has normal strength.   Oriented to person   Skin: Skin is warm and dry. No rash and no abscess noted.   Psychiatric: She has a normal mood and affect.         ED Course   Lumbar Puncture  Date/Time: 1/11/2017 9:05 PM  Location procedure was performed: [facility] EMERGENCY DEPARTMENT  Performed by: CRISTINA NAVARRO JR.  Authorized by: CRISTINA NAVARRO JR.   Pre-operative diagnosis:  Seizures  Post-operative diagnosis: same  Consent Done: Yes  Indications: evaluation for infection, evaluation for subarachnoid hemorrhage and evaluation for altered mental status  Anesthesia: local infiltration    Anesthesia:  Anesthesia: local infiltration  Local  Anesthetic: lidocaine 1% without epinephrine   Anesthetic total: 5 mL  Patient sedated: yes  Sedation type: anxiolysis  (See MAR for exact dosages of medications).  Sedatives: lorazepam  Preparation: Patient was prepped and draped in the usual sterile fashion.  Lumbar space: L4-L5 interspace  Patient's position: left lateral decubitus  Needle gauge: 18  Needle type: spinal needle - Quincke tip  Needle length: 3.5 in  Number of attempts: 3  Fluid appearance: clear  Tubes of fluid: 4  Total volume: 6 ml  Post-procedure: site cleaned and adhesive bandage applied  Complications: No  Patient tolerance: Patient tolerated the procedure well with no immediate complications        Labs Reviewed   CBC W/ AUTO DIFFERENTIAL - Abnormal; Notable for the following:        Result Value    WBC 15.20 (*)     Hemoglobin 11.2 (*)     Hematocrit 33.5 (*)     RDW 14.8 (*)     Platelets 354 (*)     Gran # 11.2 (*)     Mono # 1.4 (*)     Gran% 73.7 (*)     Lymph% 16.0 (*)     All other components within normal limits   COMPREHENSIVE METABOLIC PANEL - Abnormal; Notable for the following:     Sodium 133 (*)     CO2 20 (*)     Glucose 122 (*)     Calcium 8.5 (*)     All other components within normal limits   B-TYPE NATRIURETIC PEPTIDE - Abnormal; Notable for the following:      (*)     All other components within normal limits   POCT GLUCOSE - Abnormal; Notable for the following:     POCT Glucose 145 (*)     All other components within normal limits   TROPONIN I   CK   MAGNESIUM   PROLACTIN   POCT GLUCOSE MONITORING CONTINUOUS        Imaging Results         X-Ray Chest 1 View (Final result) Result time:  01/11/17 18:41:33    Final result by Yee Cisneros MD (01/11/17 18:41:33)    Impression:        #1. As Above.        Electronically signed by: YEE CISNEROS MD  Date:     01/11/17  Time:    18:41     Narrative:    HISTORY: Coughing    COMPARISON: Chest one view 01/10/17    FINDINGS: No new lung opacities.  No pneumothorax. No  change in the cardiopulmonary status of the patient when compared to the prior.            CT Head Without Contrast (Final result) Result time:  01/11/17 18:37:49    Final result by Yee Cisneros MD (01/11/17 18:37:49)    Impression:      #1. Suboptimal exam as above.  Given the limitations, no definitive acute intracranial abnormalities are identified.      Electronically signed by: YEE CISNEROS MD  Date:     01/11/17  Time:    18:37     Narrative:    HISTORY: Change in mental status    COMPARISON: CT head 11/28/16    FINDINGS: This is a suboptimal exam as the patient was unable to remain still/motionless during the acquisition of the images.  This produces artifact that reduces sensitivity and specificity.    There is a remote right basal ganglia lacunar-type infarct.  There is chronic white matter ischemic change.  The brain parenchyma is otherwise unremarkable with no evidence of hemorrhage, mass, or acute infarct.  Other than compensatory enlargement, the ventricular system demonstrates no distortion by mass effect.      No extra-axial hemorrhage or mass. The extracranial structures are unremarkable.  The osseous structures are unremarkable.                 Medical Decision Making:   Initial Assessment:   89 y/o WF with history of HTN, DM, thyroid disease, was seen here yesterday and diagnosed with pneumonia. She was discharged on Levaquin 750 mg PO daily. Today the son reports that she was been confused on and off. She had an episode when she was sitting in a chair and just started screaming and drooling saliva. She then got very weak and had decreased responsiveness ( she was just staring). He states the spell was like a seizure. She has no history of seizures. She had a fall and injured her head in November and was seen and had a negative CT head. No vomiting. The patient is alert and cooperative but very hard of hearing and cannot contribute to her history.   Differential Diagnosis:   Pneumonia;  dehydration; syncope; seizure; medication side effect (confusion from Levaquin)  Clinical Tests:   Lab Tests: Ordered and Reviewed  The following lab test(s) were unremarkable: CBC, CMP and Troponin       <> Summary of Lab: WBC 15.2, Hgb 11.2  Na 133 CO2 20 Glu 122;   CXR - interstitial changes same as yesterday  CT head: sub-optimal , but no acute findings  Radiological Study: Ordered and Reviewed  Medical Tests: Ordered and Reviewed  ED Management:  Exam; labs; CXR, CT head, EKG  IV NS  Patient had a tonic clonic seizure after arrival   Will do LP; consent signed by patient's son  Other:   I have discussed this case with another health care provider.       <> Summary of the Discussion: [facility] Medicine Resident will admit to Dr. Dave  Test results and recommendations discussed with the patient and her family    Critical Care 45 minutes                   ED Course     Clinical Impression:   The primary encounter diagnosis was Seizures. Diagnoses of CAP (community acquired pneumonia) and Altered mental status, unspecified altered mental status type were also pertinent to this visit.          Elias Diaz Jr., MD  01/11/17 [id number]

## 2017-01-12 LAB
ANION GAP SERPL CALC-SCNC: 11 MMOL/L
BASOPHILS # BLD AUTO: 0.06 K/UL
BASOPHILS NFR BLD: 0.4 %
BUN SERPL-MCNC: 14 MG/DL
CALCIUM SERPL-MCNC: 8.2 MG/DL
CHLORIDE SERPL-SCNC: 103 MMOL/L
CO2 SERPL-SCNC: 19 MMOL/L
CREAT SERPL-MCNC: 0.8 MG/DL
DIFFERENTIAL METHOD: ABNORMAL
EOSINOPHIL # BLD AUTO: 0.1 K/UL
EOSINOPHIL NFR BLD: 0.6 %
ERYTHROCYTE [DISTWIDTH] IN BLOOD BY AUTOMATED COUNT: 14.7 %
EST. GFR  (AFRICAN AMERICAN): >60 ML/MIN/1.73 M^2
EST. GFR  (NON AFRICAN AMERICAN): >60 ML/MIN/1.73 M^2
FOLATE SERPL-MCNC: 12.4 NG/ML
GLUCOSE SERPL-MCNC: 121 MG/DL
HCT VFR BLD AUTO: 29.6 %
HGB BLD-MCNC: 9.7 G/DL
IRON SERPL-MCNC: 44 UG/DL
LYMPHOCYTES # BLD AUTO: 3 K/UL
LYMPHOCYTES NFR BLD: 20.7 %
MCH RBC QN AUTO: 27.5 PG
MCHC RBC AUTO-ENTMCNC: 32.8 %
MCV RBC AUTO: 84 FL
MONOCYTES # BLD AUTO: 1.9 K/UL
MONOCYTES NFR BLD: 13.1 %
NEUTROPHILS # BLD AUTO: 9.2 K/UL
NEUTROPHILS NFR BLD: 64.8 %
PLATELET # BLD AUTO: 287 K/UL
PMV BLD AUTO: 10.4 FL
POTASSIUM SERPL-SCNC: 3.8 MMOL/L
PROCALCITONIN SERPL IA-MCNC: <0.09 NG/ML
RBC # BLD AUTO: 3.53 M/UL
SATURATED IRON: 16 %
SODIUM SERPL-SCNC: 133 MMOL/L
TOTAL IRON BINDING CAPACITY: 275 UG/DL
TRANSFERRIN SERPL-MCNC: 186 MG/DL
VIT B12 SERPL-MCNC: 560 PG/ML
WBC # BLD AUTO: 14.23 K/UL

## 2017-01-12 PROCEDURE — 95816 EEG AWAKE AND DROWSY: CPT

## 2017-01-12 PROCEDURE — G8996 SWALLOW CURRENT STATUS: HCPCS | Mod: CK

## 2017-01-12 PROCEDURE — A9585 GADOBUTROL INJECTION: HCPCS | Performed by: INTERNAL MEDICINE

## 2017-01-12 PROCEDURE — 97166 OT EVAL MOD COMPLEX 45 MIN: CPT

## 2017-01-12 PROCEDURE — 63600175 PHARM REV CODE 636 W HCPCS: Performed by: STUDENT IN AN ORGANIZED HEALTH CARE EDUCATION/TRAINING PROGRAM

## 2017-01-12 PROCEDURE — G8989 SELF CARE D/C STATUS: HCPCS | Mod: CM

## 2017-01-12 PROCEDURE — 92610 EVALUATE SWALLOWING FUNCTION: CPT

## 2017-01-12 PROCEDURE — G8997 SWALLOW GOAL STATUS: HCPCS | Mod: CI

## 2017-01-12 PROCEDURE — 95819 EEG AWAKE AND ASLEEP: CPT | Mod: 26,,, | Performed by: PSYCHIATRY & NEUROLOGY

## 2017-01-12 PROCEDURE — 85025 COMPLETE CBC W/AUTO DIFF WBC: CPT

## 2017-01-12 PROCEDURE — 11000001 HC ACUTE MED/SURG PRIVATE ROOM

## 2017-01-12 PROCEDURE — 80048 BASIC METABOLIC PNL TOTAL CA: CPT

## 2017-01-12 PROCEDURE — G8982 BODY POS GOAL STATUS: HCPCS | Mod: CK

## 2017-01-12 PROCEDURE — G8987 SELF CARE CURRENT STATUS: HCPCS | Mod: CM

## 2017-01-12 PROCEDURE — G8988 SELF CARE GOAL STATUS: HCPCS | Mod: CM

## 2017-01-12 PROCEDURE — 94761 N-INVAS EAR/PLS OXIMETRY MLT: CPT

## 2017-01-12 PROCEDURE — 25500020 PHARM REV CODE 255: Performed by: INTERNAL MEDICINE

## 2017-01-12 PROCEDURE — 63600175 PHARM REV CODE 636 W HCPCS: Performed by: INTERNAL MEDICINE

## 2017-01-12 PROCEDURE — G8981 BODY POS CURRENT STATUS: HCPCS | Mod: CN

## 2017-01-12 PROCEDURE — 25000003 PHARM REV CODE 250: Performed by: STUDENT IN AN ORGANIZED HEALTH CARE EDUCATION/TRAINING PROGRAM

## 2017-01-12 PROCEDURE — 36415 COLL VENOUS BLD VENIPUNCTURE: CPT

## 2017-01-12 PROCEDURE — 25000003 PHARM REV CODE 250: Performed by: INTERNAL MEDICINE

## 2017-01-12 PROCEDURE — 97163 PT EVAL HIGH COMPLEX 45 MIN: CPT

## 2017-01-12 RX ORDER — GADOBUTROL 604.72 MG/ML
5 INJECTION INTRAVENOUS
Status: COMPLETED | OUTPATIENT
Start: 2017-01-12 | End: 2017-01-12

## 2017-01-12 RX ORDER — SENNOSIDES 8.6 MG/1
8.6 TABLET ORAL DAILY PRN
Status: DISCONTINUED | OUTPATIENT
Start: 2017-01-12 | End: 2017-01-12

## 2017-01-12 RX ORDER — LACTULOSE 10 G/15ML
200 SOLUTION ORAL; RECTAL ONCE
Status: COMPLETED | OUTPATIENT
Start: 2017-01-12 | End: 2017-01-12

## 2017-01-12 RX ORDER — SENNOSIDES 8.6 MG/1
8.6 TABLET ORAL DAILY
Status: DISCONTINUED | OUTPATIENT
Start: 2017-01-12 | End: 2017-01-23 | Stop reason: HOSPADM

## 2017-01-12 RX ADMIN — ACYCLOVIR SODIUM 500 MG: 50 INJECTION, SOLUTION INTRAVENOUS at 10:01

## 2017-01-12 RX ADMIN — GADOBUTROL 5 ML: 604.72 INJECTION INTRAVENOUS at 11:01

## 2017-01-12 RX ADMIN — LACTULOSE 200 G: 10 SOLUTION ORAL at 09:01

## 2017-01-12 RX ADMIN — AZTREONAM 2000 MG: 2 INJECTION, POWDER, LYOPHILIZED, FOR SOLUTION INTRAMUSCULAR; INTRAVENOUS at 03:01

## 2017-01-12 RX ADMIN — AZTREONAM 2000 MG: 2 INJECTION, POWDER, LYOPHILIZED, FOR SOLUTION INTRAMUSCULAR; INTRAVENOUS at 04:01

## 2017-01-12 RX ADMIN — VANCOMYCIN HYDROCHLORIDE 750 MG: 750 INJECTION, POWDER, LYOPHILIZED, FOR SOLUTION INTRAVENOUS at 03:01

## 2017-01-12 RX ADMIN — SULFAMETHOXAZOLE AND TRIMETHOPRIM 78.72 MG: 80; 16 INJECTION, SOLUTION, CONCENTRATE INTRAVENOUS at 04:01

## 2017-01-12 RX ADMIN — SODIUM CHLORIDE, PRESERVATIVE FREE 3 ML: 5 INJECTION INTRAVENOUS at 02:01

## 2017-01-12 RX ADMIN — PRAVASTATIN SODIUM 20 MG: 20 TABLET ORAL at 08:01

## 2017-01-12 RX ADMIN — POLYETHYLENE GLYCOL 3350 17 G: 17 POWDER, FOR SOLUTION ORAL at 08:01

## 2017-01-12 RX ADMIN — SODIUM CHLORIDE, PRESERVATIVE FREE 3 ML: 5 INJECTION INTRAVENOUS at 05:01

## 2017-01-12 RX ADMIN — LEVOTHYROXINE SODIUM 75 MCG: 75 TABLET ORAL at 08:01

## 2017-01-12 RX ADMIN — SULFAMETHOXAZOLE AND TRIMETHOPRIM 78.72 MG: 80; 16 INJECTION, SOLUTION, CONCENTRATE INTRAVENOUS at 08:01

## 2017-01-12 RX ADMIN — LEVETIRACETAM 1000 MG: 10 INJECTION INTRAVENOUS at 08:01

## 2017-01-12 RX ADMIN — ENOXAPARIN SODIUM 40 MG: 100 INJECTION SUBCUTANEOUS at 12:01

## 2017-01-12 RX ADMIN — AZTREONAM 2000 MG: 2 INJECTION, POWDER, LYOPHILIZED, FOR SOLUTION INTRAMUSCULAR; INTRAVENOUS at 09:01

## 2017-01-12 RX ADMIN — SENNOSIDES 8.6 MG: 8.6 TABLET ORAL at 08:01

## 2017-01-12 RX ADMIN — DOCUSATE SODIUM 100 MG: 100 CAPSULE, LIQUID FILLED ORAL at 08:01

## 2017-01-12 RX ADMIN — ACYCLOVIR SODIUM 500 MG: 50 INJECTION, SOLUTION INTRAVENOUS at 03:01

## 2017-01-12 RX ADMIN — SULFAMETHOXAZOLE AND TRIMETHOPRIM 78.72 MG: 80; 16 INJECTION, SOLUTION, CONCENTRATE INTRAVENOUS at 12:01

## 2017-01-12 RX ADMIN — ACYCLOVIR SODIUM 500 MG: 50 INJECTION, SOLUTION INTRAVENOUS at 01:01

## 2017-01-12 RX ADMIN — ASPIRIN 81 MG: 81 TABLET, COATED ORAL at 08:01

## 2017-01-12 NOTE — PLAN OF CARE
Problem: Patient Care Overview  Goal: Plan of Care Review  Outcome: Ongoing (interventions implemented as appropriate)  Pt alert and only says a couple of words when spoken to. Grandson at bedside throughout night. Initial and admit assessment documented per flowsheet. Rectal exam done per MD, ELIEZER noted. Pt's skin is very thin and bruises easily. Pt seems agitated and almost postictal from earlier seizure activity. Pt keeps pulling at lines and diaper. IV antibiotics administered per MAR. Cardiac monitoring initiated. Safety maintained - will cont to monitor.

## 2017-01-12 NOTE — PLAN OF CARE
Problem: Occupational Therapy Goal  Goal: Occupational Therapy Goal  Goals to be met by: 2/12/17     Patient will increase functional independence with ADLs by performing:    Feeding with Minimal Assistance.  Grooming while seated with Minimal Assistance.  Toileting from bedside commode with Moderate Assistance for hygiene and clothing management.   Rolling to Bilateral with Minimal Assistance.   Supine to sit with Moderate Assistance.  Stand pivot transfers with Moderate Assistance.  Toilet transfer to bedside commode with Moderate Assistance.   Outcome: Ongoing (interventions implemented as appropriate)  Pt would benefit from cont OT services in order to maximize functional independence. Recommending return home with 24 hour assist/supervision and hospital bed.

## 2017-01-12 NOTE — PLAN OF CARE
Problem: SLP Goal  Goal: SLP Goal  Short Term Goals:   1. Pt will tolerate a dental soft diet and thin liquids with no overt s/s of aspiration.   Outcome: Ongoing (interventions implemented as appropriate)  Swallow study completed. Full report to follow. Recommend: Dental soft diet, thin liquids, whole po medications 1-2 at a time, upright for all po intake and 30 minutes s/p po intake, small bites/sips, assistance with meals, monitor for s/s of aspiration. ST will f/u to assess diet tolerance.  PARISA Kendrick., CCC-SLP  01/12/2017

## 2017-01-12 NOTE — PLAN OF CARE
01/12/17 1540   Discharge Assessment   Assessment Type Discharge Planning Assessment   Confirmed/corrected address and phone number on facesheet? Yes   Assessment information obtained from? Caregiver   Expected Length of Stay (days) 2   Communicated expected length of stay with patient/caregiver yes   Prior to hospitilization cognitive status: Alert/Oriented   Prior to hospitalization functional status: Needs Assistance   Current cognitive status: Unable to Assess   Current Functional Status: Needs Assistance   Arrived From home or self-care   Lives With child(zully), adult   Able to Return to Prior Arrangements yes   Is patient able to care for self after discharge? No   How many people do you have in your home that can help with your care after discharge? 2   Who are your caregiver(s) and their phone number(s)?  CAMI ALANIZ [phone]   Patient's perception of discharge disposition admitted as an inpatient   Readmission Within The Last 30 Days no previous admission in last 30 days   Patient currently being followed by outpatient case management? No   Patient currently receives home health services? No   Does the patient currently use HME? Yes   Name and contact number for HME provider: DONT REMEMBER   Patient currently receives private duty nursing? No   Patient currently receives any other outside agency services? No   Equipment Currently Used at Home walker, standard;wheelchair;raised toilet;shower chair   Do you have any problems affording any of your prescribed medications? No   Is the patient taking medications as prescribed? yes   Do you have any financial concerns preventing you from receiving the healthcare you need? No   Does the patient have transportation to healthcare appointments? Yes   Transportation Available family or friend will provide   On Dialysis? No   Does the patient receive services at the Coumadin Clinic? No   Are there any open cases? No   Discharge Plan A Home with family   Discharge Plan B  Home with family   Patient/Family In Agreement With Plan yes

## 2017-01-12 NOTE — PROGRESS NOTES
"[facility] Internal Medicine Resident HO-I Progress Note    Subjective:      States she feels better today.      Objective:   Last 24 Hour Vital Signs:  BP  Min: 110/68  Max: 158/65  Temp  Av.8 °F (37.1 °C)  Min: 98.2 °F (36.8 °C)  Max: 99.4 °F (37.4 °C)  Pulse  Av.1  Min: 75  Max: 105  Resp  Av.3  Min: 16  Max: 22  SpO2  Av.4 %  Min: 93 %  Max: 100 %  Height  Av' 2" (157.5 cm)  Min: 5' 2" (157.5 cm)  Max: 5' 2" (157.5 cm)  Weight  Av.2 kg (104 lb)  Min: 47.2 kg (104 lb)  Max: 47.2 kg (104 lb)       Physical Examination:  Last 24 Hour Vital Signs:  BP  Min: 110/68  Max: 158/65  Temp  Av.8 °F (37.1 °C)  Min: 98.2 °F (36.8 °C)  Max: 99.4 °F (37.4 °C)  Pulse  Av.1  Min: 75  Max: 105  Resp  Av.3  Min: 16  Max: 22  SpO2  Av.4 %  Min: 93 %  Max: 100 %  Height  Av' 2" (157.5 cm)  Min: 5' 2" (157.5 cm)  Max: 5' 2" (157.5 cm)  Weight  Av.2 kg (104 lb)  Min: 47.2 kg (104 lb)  Max: 47.2 kg (104 lb)  Body mass index is 19.02 kg/(m^2).       General:  Awake in no apparent distress, patient is deaf  Head/Neck  Normocephalic and atraumatic, No Nuchal Rigidity  Eyes:    PERRL; EOMi with anicteric sclera and clear conjunctivae  Mouth:   Oropharynx clear and without exudate; moist mucous membranes  Cardio:   Regular rate and rhythm with normal S1 and S2; no murmurs or rubs  Resp:    CTAB; respirations unlabored; no wheezes, crackles or rhonchi  Abdom:  Soft, NTND with normoactive bowel sounds  Extrem:  Warm and well-perfused with no clubbing, cyanosis or edema  Skin:    No rashes, lesions, or color changes  Pulses:  2+ and symmetric distally  Neuro:   Awake, responds to light touch, can answer some questions by lip reading, moving all limbs spontaneously, sensation to light touch intact throughout, no focal deficits    Laboratory:  Laboratory Data  Pertinent Findings:  Recent Labs      01/10/17   1052  01/10/17   1554  17   1700  17   0459   WBC  11.15  14.79*  15.20*  " 14.23*   HGB  11.4*  11.6*  11.2*  9.7*   HCT  34.5*  35.3*  33.5*  29.6*   PLT  322  342  354*  287   MCV  84  84  83  84   RDW  15.1*  14.8*  14.8*  14.7*   GRAN  70.9  7.9*  71.3  10.5*  73.7*  11.2*  64.8  9.2*   LYMPH  20.3  2.3  20.0  3.0  16.0*  2.4  20.7  3.0   MONO  7.4  0.8  7.3  1.1*  8.9  1.4*  13.1  1.9*   EOS  0.1  0.1  0.1  0.1   BASO  0.04  0.10  0.05  0.06   EOSINOPHIL  0.8  0.7  0.7  0.6   BASOPHIL  0.4  0.7  0.3  0.4       Recent Labs      01/10/17   1052  01/10/17   1554  01/11/17   1700  01/12/17   0459   NA  134*  135*  133*  133*   K  3.7  3.9  4.0  3.8   CL  102  102  103  103   CO2  25  23  20*  19*   BUN  18  18  19  14   CREATININE  0.9  0.8  0.8  0.8     Recent Labs      01/11/17   1732   POCTGLUCOSE  145*     Recent Labs      01/10/17   1554  01/11/17   1700   PROT  7.3  6.8   ALBUMIN  3.6  3.6   BILITOT  0.5  0.6   AST  32  28   ALT  13  13   ALKPHOS  60  59       Microbiology Data  Pertinent Findings:  - 1/12/17 Blood cx x2: pending  - 1/11/17 LP results: Colorless, WBC 6, , Diff: 42% Neut, 53% Lymph, Glu 69, Prot 53    Other Results:  EKG (my interpretation): no new EKG    Radiology Data   Pertinent Findings (my interpretation):  No new imaging    Current Medications:     Infusions:        Scheduled:   acyclovir  10 mg/kg (Ideal) Intravenous Q8H    aspirin  81 mg Oral Daily    aztreonam  2,000 mg Intravenous Q6H    docusate sodium  100 mg Oral BID    enoxaparin  40 mg Subcutaneous Daily    levetiracetam IVPB  1,000 mg Intravenous Q12H    levothyroxine  75 mcg Oral Daily    polyethylene glycol  17 g Oral Daily    pravastatin  20 mg Oral Daily    sodium chloride 0.9%  3 mL Intravenous Q8H    trimethoprim-sulfamethoxasole (BACTRIM) IVPB (for non fluid restricted pts) (fixed ratio)  5 mg/kg/day Intravenous Q8H    vancomycin (VANCOCIN) IVPB  15 mg/kg Intravenous Q12H        PRN:      Antibiotics and Day Number of Therapy:  Vancomycin 750mg IV q12hr Day  2  Aztreonam 2g IV q6hr Day 2  Bactrim 400-80mg IV q8hr Day 2  Acyclovir 10mg/kg q8hr Day 2    Lines and Day Number of Therapy:  PIV x1 18G    Assessment:     Sue Capone is a 89 y.o.female with  Patient Active Problem List    Diagnosis Date Noted    Seizure 01/11/2017    Seizures 01/11/2017    Dementia without behavioral disturbance 09/01/2016    History of breast cancer 09/01/2016    Pre-diabetes 09/01/2016    Hypothyroidism 09/01/2016    Dyslipidemia 09/01/2016        Plan:     89 yo F w/ PMH Pneumonia and UTI discharged 1 day ago from ED on Levofloxacin, hypothyroidism, HLD, Breast CA w/ resection and radiation in 1995 with complaint of seizure x1 day presents with     Generalized Tonic Clonic Seizures  - DDx includes meningitis/encephalitis vs mass lesions vs epilepsy  - Tonic clonic seizures x2 witnessed, no loss bowel/bladder, first self resolved, seizure in ED resolved after Lorazepam 1mg IV  - AMS, fever and chills x2 days. Sent home from ED on 1/10/17 with Levofloxacin 750mg PO for pneumonia (CURB 65 =1)  - Initial vitals, afebrile 98.2F, HR 90, /74, SaO2 95% RA  - At admission, WBC 15.2, afebrile. No nuchal rigidity.   - 1/12/17 Blood cx x2: pending  - 1/11/17 LP results: Colorless, WBC 6, , Diff: 42% Neut, 53% Lymph, Glu 69, Prot 53  - F/u MRI and EEG  - Consult Neurology ([place])  - Due to RBC in CSF, consider HSV over bacterial. F/u HSV PCR CSF  - Continue on Vancomycin / Aztreonam / Bactrim / Acyclovir IV for meningitis/encephalitis coverage (penicillin allergy). Keppra loaded, continue Keppra.      Pneumonia  - 1/9/17 CXR: RLL consolidation  - At admission on exam, Lungs CTAB  - Negative Procalcitonin <0.09  - Continue Vancomycin / Aztreonam / Bactrim     Normocytic Anemia  - At admission, H/H 11.2/33.5 MCV 83  - 1/11/17 Iron 44, Ferritin 115. B12 and Folate wnl  - Consider treatment with PO iron supplementation as     Constipation  - As per family, no BM in 5 days  - In ED,  patient disempaced  - Continue miralax PRN, colace. Consider soap suds enema. May add/titrate meds as needed     Hypothyroidism  - 1/11/17 TSH: 2.219, FT4  1.18  - Continue home synthroid     HLD  - 1/3/17 Lipid Panel: TChol 153, LDL 71  - Continue home statin     DVT PPx: Lovenox  Diet: NPO  Code: DNR  Social: Lives at home. PT/OT: family refuses HH PT/OT. [facility] hospital bed     Disposition: Pending clinical evaluation and clinical improvement    Ming Bai  [facility] Internal Medicine HO-I  [facility] Internal Medicine Service Team B    [facility] Medicine Hospitalist Pager numbers:   [facility] Hospitalist Medicine Team A (Benita/Elpidio): [phone]  [facility] Hospitalist Medicine Team B (Jolie/Jorge):  [phone]

## 2017-01-12 NOTE — PROGRESS NOTES
Patient is off the floor in MRI at this time.  Patient has huge and continuous bowel movements.  Son at bedside.  Will continue to monitor.

## 2017-01-12 NOTE — PT/OT/SLP EVAL
Physical Therapy  Evaluation    Sue Capone   MRN: [id number]   Admitting Diagnosis: Seizures    PT Received On: 01/12/17  PT Start Time: 1005     PT Stop Time: 1020    PT Total Time (min): 15 min       Billable Minutes:  Evaluation 15 minutes    Diagnosis: Seizures  Generalized tonic/clonic seizures    Past Medical History   Diagnosis Date    Cancer     Diabetes mellitus type II     Hypertension     Thyroid disease       Past Surgical History   Procedure Laterality Date    Hysterectomy      Breast surgery      Shoulder surgery       left    Cholecystectomy         Referring physician: Jolie  Date referred to PT: 1/1/17    General Precautions: Standard, fall, deaf  Orthopedic Precautions: N/A   Braces:  (NA)       Do you have any cultural, spiritual, Yarsanism conflicts, given your current situation?: No    Patient History:  Lives With: child(zully), adult (son and daughter in law)  Living Arrangements: house  Home Accessibility: stairs to enter home  Number of Stairs to Enter Home: 1 (TH)  Transportation Available: family or friend will provide  Living Environment Comment: History provided by pt's son 2/2 pt poor historian 2/2 impaired cognition and impaired hearing. Pt lives with her son and DIL in a [facility] c/ TH to enter. Pt used w/c as primary mode of mobility, was unable to self propel w/c, required (A) for functional tranfers, short distance ambulation, all ADL. Pt has a walk-in shower c/ a shower chair and grab bars.   Equipment Currently Used at Home: wheelchair, shower chair, BSC  DME owned (not currently used): none    Previous Level of Function:  Ambulation Skills: needs assist (only ambulated from w/c into bathroom and back to w/c c/ (A) and holiding onto furniture)  Transfer Skills: needs assist  ADL Skills: needs assist (aide to bathe pt )  Work/Leisure Activity: needs assist    Subjective:  Communicated with RN prior to session.    Chief Complaint: seizures, AMS  Patient goals: none stated    Pain  Ratin/10                    Objective:   Patient found with: telemetry     Cognitive Exam:  Oriented to: not oriented    Follows Commands/attention: Follows one-step commands  Communication: able to verbalize short sentences  Safety awareness/insight to disability: intact    Physical Exam:  Postural examination/scapula alignment: Rounded shoulder and Head forward    Skin integrity: Bruising of BUE  Edema: None noted     Sensation: unable to determine 2/2 poor cognition        Lower Extremity Range of Motion:  Right Lower Extremity: PROM WFL  Left Lower Extremity: Deficits: PROM WFL    Lower Extremity Strength: unable to determine 2/2 poor command following      Functional Mobility:  Bed Mobility:  Scooting/Bridging: Total Assistance  Supine to Sit: Total Assistance  Sit to Supine: Total Assistance    Transfers:  Sit <> Stand Assistance: Total Assistance (Max A x 2 assists c/ HHA)  Sit <> Stand Assistive Device:  (HHA x 2 assists)    Gait:   Gait Distance:  (attempted but unable to take steps)    Stairs: not tested      Balance:   Static Sit: POOR+: Needs MINIMAL assist to maintain  Dynamic Sit: FAIR: Cannot move trunk without losing balance  Static Stand: 0: Needs MAXIMAL assist to maintain   Dynamic stand: 0: N/A    Therapeutic Activities and Exercises:  See above    AM-PAC 6 CLICK MOBILITY  How much help from another person does this patient currently need?   1 = Unable, Total/Dependent Assistance  2 = A lot, Maximum/Moderate Assistance  3 = A little, Minimum/Contact Guard/Supervision  4 = None, Modified [place]/Independent    Turning over in bed (including adjusting bedclothes, sheets and blankets)?: 2  Sitting down on and standing up from a chair with arms (e.g., wheelchair, bedside commode, etc.): 2  Moving from lying on back to sitting on the side of the bed?: 2  Moving to and from a bed to a chair (including a wheelchair)?: 2  Need to walk in hospital room?: 1  Climbing 3-5 steps with a railing?:  1  Total Score: 10     AM-PAC Raw Score CMS G-Code Modifier Level of Impairment Assistance   6 % Total / Unable   7 - 9 CM 80 - 100% Maximal Assist   10 - 14 CL 60 - 80% Moderate Assist   15 - 19 CK 40 - 60% Moderate Assist   20 - 22 CJ 20 - 40% Minimal Assist   23 CI 1-20% SBA / CGA   24 CH 0% Independent/ Mod I     Patient left supine with all lines intact, call button in reach, RN notified and son present.    Assessment:   Sue Capone is a 89 y.o. female with a medical diagnosis of Seizures. Currently, pt requires Total A for functional transfers. At this time, mobility limited by decreased strength, decreased balance, decreased endurance, and decreased cognition. Recommend d/c home with  supervision/assistance for safety. Recommended DME includes hospital bed. Pt's family declined HH PT/OT.     Rehab identified problem list/impairments: Rehab identified problem list/impairments: weakness, impaired self care skills, impaired functional mobilty, impaired balance, gait instability, impaired cognition, decreased upper extremity function, decreased lower extremity function, decreased safety awareness, decreased ROM, impaired skin    Rehab potential is poor.    Activity tolerance: Poor    Discharge recommendations: Discharge Facility/Level Of Care Needs:  (recommend d/c home c/  supervision/assistance; pt's son declined HH PT/OT)     Barriers to discharge: Barriers to Discharge:  (current level of functional mobility)    Equipment recommendations: Equipment Needed After Discharge: hospital bed     GOALS:   Physical Therapy Goals        Problem: Physical Therapy Goal    Goal Priority Disciplines Outcome Goal Variances Interventions   Physical Therapy Goal     PT/OT, PT Ongoing (interventions implemented as appropriate)     Description:  Goals to be met by: 17     Patient will increase functional independence with mobility by performin. Supine to sit with Moderate Assistance  2. Sit to  supine with Moderate Assistance  3. Bed to chair transfer with Moderate Assistance   4. Sitting at edge of bed x15 minutes with Minimal Assistance  5. Lower extremity exercise program x 10-15 reps per handout, with assistance as needed    Recommendations: Recommend d/c home with 24/7 supervision/assistance for safety. Recommended DME includes hospital bed. Pt's family declined HH PT/OT.                 PLAN:    Patient to be seen 3 x/week to address the above listed problems via therapeutic activities, therapeutic exercises, neuromuscular re-education  Plan of Care expires: 02/12/17  Plan of Care reviewed with: patient, mirna Garg, PT  01/12/2017

## 2017-01-12 NOTE — PT/OT/SLP EVAL
"Occupational Therapy  Evaluation and D/c Summary     Sue Capone   MRN: [id number]   Admitting Diagnosis: Seizures    OT Date of Treatment: 01/12/17   OT Start Time: 1006  OT Stop Time: 1020  OT Total Time (min): 14 min    Billable Minutes:  Evaluation 14    Diagnosis: Seizures   The primary encounter diagnosis was Seizures. Diagnoses of CAP (community acquired pneumonia) and Altered mental status, unspecified altered mental status type were also pertinent to this visit.      Past Medical History   Diagnosis Date    Cancer     Diabetes mellitus type II     Hypertension     Thyroid disease       Past Surgical History   Procedure Laterality Date    Hysterectomy      Breast surgery      Shoulder surgery       left    Cholecystectomy             General Precautions: Standard, fall, deaf  Orthopedic Precautions: N/A  Braces:            Patient History:  Living Environment  Lives With: child(zully), adult  Living Arrangements: house  Home Accessibility: stairs to enter home  Number of Stairs to Enter Home: 1  Living Environment Comment: History provided by pt's son. Pt lives with son and DIL in a SS home, threshold to enter, walk in shower with grab bars and shower chair. Pt requires assist for all ADLs and functional mobility and t/fs. Mostly in w/c and unable to propel self, assist to BSC and toileting. Son holds onto to pt for minimal ambulation   Equipment Currently Used at Home: shower chair, wheelchair    Prior level of function:   Bed Mobility/Transfers: needs device and assist  Grooming: needs assist  Bathing: needs assist  Upper Body Dressing: needs assist  Lower Body Dressing: needs assist  Toileting: needs device and assist  Home Management Skills: unable to perform        Dominant hand: right    Subjective:  Communicated with nsg prior to session.  Pt mouthing words throughout session; pt's son providing history. Pt states, " I really just don't see the point of therapy at this stage. She's had it at " "home before and it didn't do anything."   Chief Complaint: none stated  Patient/Family stated goals: return home     Pain Ratin/10                   Objective:       Cognitive Exam:  Oriented to: unable to assess; demonstrates confusion throughout session   Follows Commands/attention: Inattentive, Easily distracted and Follows one-step commands  Communication: mouthing aphonia   Memory:  Impaired STM, Impaired LTM and Poor immediate recall  Safety awareness/insight to disability: impaired  Coping skills/emotional control: Appropriate to situation      Physical Exam:  Postural examination/scapula alignment: Rounded shoulder, Head forward and Kyphosis  Skin integrity: Bruising of BUEs  Edema: Mild BUEs    Upper Extremity Range of Motion:  Right Upper Extremity: WFL AAROM  Left Upper Extremity: decreased function; Arm splint for IV protector; grimacing with movement     Upper Extremity Strength:  Right Upper Extremity: decreased throughout   Left Upper Extremity: decreased throughout    Strength: fair +         Functional Mobility:  Bed Mobility:  Scooting/Bridging: Total Assistance  Supine to Sit: Total Assistance  Sit to Supine: Total Assistance    Transfers:  Sit <> Stand Assistance: Maximum Assistance (of 2 )  Sit <> Stand Assistive Device: No Assistive Device      Activities of Daily Living:     Toileting Where Assessed: Bed level  Toileting Level of Assistance: Total assistance      Balance:   Static Sit: POOR+: Needs MINIMAL assist to maintain  Dynamic Sit: POOR: N/A  Static Stand: 0: Needs MAXIMAL assist to maintain   Dynamic stand: POOR: N/A        AM-PAC 6 CLICK ADL  How much help from another person does this patient currently need?  1 = Unable, Total/Dependent Assistance  2 = A lot, Maximum/Moderate Assistance  3 = A little, Minimum/Contact Guard/Supervision  4 = None, Modified [place]/Independent    Putting on and taking off regular lower body clothing? : 1  Bathing (including washing, " "rinsing, drying)?: 1  Toileting, which includes using toilet, bedpan, or urinal? : 1  Putting on and taking off regular upper body clothing?: 2  Taking care of personal grooming such as brushing teeth?: 2  Eating meals?: 2  Total Score: 9    AM-PAC Raw Score CMS "G-Code Modifier Level of Impairment Assistance   6 % Total / Unable   7 - 9 CM 80 - 100% Maximal Assist   10 - 14 CL 60 - 80% Moderate Assist   15 - 19 CK 40 - 60% Moderate Assist   20 - 22 CJ 20 - 40% Minimal Assist   23 CI 1-20% SBA / CGA   24 CH 0% Independent/ Mod I       Patient left supine with all lines intact, call button in reach, bed alarm on, nsg notified and son present    Assessment:  Sue Capone is a 89 y.o. female with a medical diagnosis of Seizures and presents with confusion. Pt requires assist for all ADLs at home and functional mobility. Minimal ambulation. Recommending home with 24/7 supervision and assist and hospital bed.     Rehab identified problem list/impairments: Rehab identified problem list/impairments: weakness, impaired functional mobilty, impaired endurance, impaired cognition, gait instability, impaired balance, decreased safety awareness, impaired skin, edema, impaired coordination, decreased lower extremity function, decreased upper extremity function    Rehab potential is fair.    Activity tolerance: Poor    Discharge recommendations: Discharge Facility/Level Of Care Needs: home     Barriers to discharge: Barriers to Discharge: None    Equipment recommendations: hospital bed     GOALS:   Occupational Therapy Goals     Not on file      Multidisciplinary Problems (Resolved)        Problem: Occupational Therapy Goal    Goal Priority Disciplines Outcome Interventions   Occupational Therapy Goal   (Resolved)     OT, PT/OT Outcome(s) achieved              PLAN:  Patient to be seen  (D/c OT ) to address the above listed problems via    Plan of Care expires: 01/12/17  Plan of Care reviewed with: patient, son    OT " G-codes  Functional Assessment Tool Used: am pac   Score: 9  Functional Limitation: Self care  Self Care Current Status (): KEREN  Self Care Goal Status (): KEREN  Self Care Discharge Status (): KEREN Malhotra, OT  01/12/2017

## 2017-01-12 NOTE — PT/OT/SLP EVAL
Speech Language Pathology  Bedside Swallow Study  Evaluation    Sue Capone   MRN: [id number]   [id number] A    Admitting Diagnosis: Seizures    Diet recommendations: Solid Diet Level: Dental Soft  Liquid Diet Level: Thin   · Feed only when awake/alert,   · HOB to 90 degrees,   · Small bites/sips, cue slow rate  · Check for pocketing/oral residue,   · Remain upright 30 minutes post meal,   · Meds whole 1 at a time and   · Assistance with meals  · Alternate bites and sips  **Possible MBSS pending tolerance of diet; monitor for s/s of aspiration    SLP Treatment Date: 01/12/17  Speech Start Time: 0850     Speech Stop Time: 0910     Speech Total (min): 20 min       TREATMENT BILLABLE MINUTES:  Eval Swallow and Oral Function 20    Diagnosis: Seizures    H&P: 89 yo F w/ PMH Pneumonia and UTI discharged 1 day ago from ED on Levofloxacin, hypothyroidism, HLD with complaint of seizure x1 day. Patient was in her usual state of health until (lives with son and daughter and law at home, requires assistance with all ADLs including bathing, transferring, toileting, uses diaper, does not walk uses wheelchair, require assitance from family for medications) On 1/9/16, she presented to her PCP for change in baseline mental status including increased confusion and sleepiness, was found to have a UTI and recommended to follow-up with a CXR. She sent home on Levofloxacin 250mg PO for a UTI and referral for CXR. On 1/10/17 patient followed-up with a CXR and was told she had a possible pneumonia and was told to come to the ED. In the ED, patient was told she did have an ED and was discharged home with a CURD 65 =1 with Levofloxacin 750mg PO. As per family, around 3pm they heard the patient screaming loud and was found shaking and drooling. Shaking episode lasted for about 30 secs, self-resolved. Patient did no lose bowels or bladder. Episode was followed by a period of confusion with slow recovery of mental status back to baseline. Family  "called EMS, and on arrival to ED, patient again had an episode of shaking and drooling that lasted less than 1min. Family states for the past 2 days, patient has been complaining of feeling "hot" and "cold." Review of systems could not be obtained due to patient being deaf and AMS.    CXR : No new lung opacities.  No pneumothorax. No change in the cardiopulmonary status of the patient when compared to the prior.    17 CXR: RLL consolidation    Head CT: #1. Suboptimal exam as above.  Given the limitations, no definitive acute intracranial abnormalities are identified.    Per respiratory: MD - Ms. Capone seems to be pooling secretions in the throat - perhaps a swallow study may indicated?  Thanks - Respiratory.    Past Medical History   Diagnosis Date    Cancer     Diabetes mellitus type II     Hypertension     Thyroid disease      Past Surgical History   Procedure Laterality Date    Hysterectomy      Breast surgery      Shoulder surgery       left    Cholecystectomy         Has the patient been evaluated by SLP for swallowing? : Yes  Keep patient NPO?: No   General Precautions: Standard, aspiration, hearing impaired, deaf    Current Respiratory Status:  (room air)       Prior diet: regular/thin.      Subjective:  Pt awake and cooperative. HOB raised to 90 degrees and pt repositioned with RN assistance. RN and family present in room throughout study. Pt hard of hearing. Family reports pt previously had hearing aids, and she threw them away.   Patient goals: none stated.     Pain Ratin/10  Pain Rating Post-Intervention: 0/10    Objective:      Oral Musculature Evaluation  Dentition: upper and lower dentures, uses dentures to eat  Mucosal Quality: adequate  Mandibular Strength and Mobility: impaired  Oral Labial Strength and Mobility: WFL  Lingual Strength and Mobility: impaired protrusion, impaired strength  Velar Elevation: WFL  Buccal Strength and Mobility: WFL  Volitional Cough: not " elicited  Volitional Swallow: decreased elevation/excursion upon palpation  Voice Prior to PO Intake: clear     Bedside Swallow Eval:  Consistencies Assessed: Thin liquids ice chip x1; cup sip x2, straw sips x5, Puree tsp apple sauce x4 and Solids bites of danna cracker x2, RN administered po medicaitons (2 buried in puree which pt chewed, 3 taken with liquid wash)  Oral Phase: slow oral transit time  Pharyngeal Phase: Cough noted s/p all trials. Pt took large self regulated sips of water with Miralax. No other overt s/s of aspiration noted. SLP will follow up to assess diet tolerance and possible MBSS.      Assessment:  Sue Capone is a 89 y.o. female with a medical diagnosis of Seizures and presents with coughing s/p large self regulated consecutive sips of water. RT noted pooling in throat. ST will f/u to assess diet tolerance and possible MBSS.           Discharge recommendations: Discharge Facility/Level Of Care Needs:  (tbd)     Goals:   SLP Goals        Problem: SLP Goal    Goal Priority Disciplines Outcome   SLP Goal     SLP Ongoing (interventions implemented as appropriate)   Description:  Short Term Goals:   1. Pt will tolerate a dental soft diet and thin liquids with no overt s/s of aspiration.                  Plan:   Patient to be seen Therapy Frequency: 3 x/week   Plan of Care expires: 02/10/17  Plan of Care reviewed with: patient, spouse, son  SLP Follow-up?: Yes  SLP - Next Visit Date: 01/13/17      SLP G-Codes  Functional Assessment Tool Used: noms  Score: 4  Functional Limitations: Swallowing  Swallow Current Status (): CK  Swallow Goal Status (): JOB Rivera CCC-SLP  01/12/2017

## 2017-01-12 NOTE — PLAN OF CARE
Problem: Occupational Therapy Goal  Goal: Occupational Therapy Goal  Outcome: Outcome(s) achieved Date Met:  01/12/17  Pt requires assist for all ADLs at home and functional mobility. Minimal ambulation. Recommending home with 24/7 supervision and assist and hospital bed.

## 2017-01-12 NOTE — H&P
"[facility] Internal Medicine History and Physical - Resident Note    Admitting Team: [facility] Internal Medicine Team B  Attending Physician: Dr. Kory Dave  Resident: Alejandro  Interns: Bhumika    Date of Admit: 1/11/2017    Chief Complaint     Seizures x1 day    Subjective:      History of Present Illness:  89 yo F w/ PMH Pneumonia and UTI discharged 1 day ago from ED on Levofloxacin, hypothyroidism, HLD with complaint of seizure x1 day. Patient was in her usual state of health until (lives with son and daughter and law at home, requires assistance with all ADLs including bathing, transferring, toileting, uses diaper, does not walk uses wheelchair, require assitance from family for medications) On 1/9/16, she presented to her PCP for change in baseline mental status including increased confusion and sleepiness, was found to have a UTI and recommended to follow-up with a CXR. She sent home on Levofloxacin 250mg PO for a UTI and referral for CXR. On 1/10/17 patient followed-up with a CXR and was told she had a possible pneumonia and was told to come to the ED. In the ED, patient was told she did have an ED and was discharged home with a CURD 65 =1 with Levofloxacin 750mg PO. As per family, around 3pm they heard the patient screaming loud and was found shaking and drooling. Shaking episode lasted for about 30 secs, self-resolved. Patient did no lose bowels or bladder. Episode was followed by a period of confusion with slow recovery of mental status back to baseline. Family called EMS, and on arrival to ED, patient again had an episode of shaking and drooling that lasted less than 1min. Family states for the past 2 days, patient has been complaining of feeling "hot" and "cold." Review of systems could not be obtained due to patient being deaf and AMS.    Past Medical History:  Past Medical History   Diagnosis Date    Cancer     Diabetes mellitus type II     Hypertension     Thyroid disease        Past Surgical History:  Past " "Surgical History   Procedure Laterality Date    Hysterectomy      Breast surgery      Shoulder surgery       left    Cholecystectomy         Allergies:  Review of patient's allergies indicates:   Allergen Reactions    Codeine     Penicillins        Home Medications:  Prior to Admission medications    Medication Sig Start Date End Date Taking? Authorizing Provider   aspirin (ECOTRIN) 81 MG EC tablet Take 81 mg by mouth once daily.    Historical Provider, MD   levoFLOXacin (LEVAQUIN) 750 MG tablet Take 1 tablet (750 mg total) by mouth once daily. 1/10/17 1/15/17  Stephane Sparks MD   levothyroxine (SYNTHROID) 75 MCG tablet Take 1 tablet (75 mcg total) by mouth once daily. 16   Collins Willams MD   pravastatin (PRAVACHOL) 20 MG tablet Take 1 tablet (20 mg total) by mouth once daily. 16   Collins Willams MD       Family History:  History reviewed. No pertinent family history.    Social History:  Social History   Substance Use Topics    Smoking status: Former Smoker    Smokeless tobacco: Never Used    Alcohol use No       Review of Systems:  Review of systems could not be obtained due to patient being deaf and AMS.     Health Maintaince :   Primary Care Physician: Collins Willams MD  Immunizations:   TDap unknown up to date, .  Influenza unknown up to date, .  Pneumovax unknown up to date, .    Objective:   Last 24 Hour Vital Signs:  BP  Min: 123/85  Max: 158/74  Temp  Av.2 °F (36.8 °C)  Min: 98.2 °F (36.8 °C)  Max: 98.2 °F (36.8 °C)  Pulse  Av.8  Min: 83  Max: 105  Resp  Av.6  Min: 16  Max: 20  SpO2  Av.2 %  Min: 95 %  Max: 99 %  Height  Av' 2" (157.5 cm)  Min: 5' 2" (157.5 cm)  Max: 5' 2" (157.5 cm)  Weight  Av.2 kg (104 lb)  Min: 47.2 kg (104 lb)  Max: 47.2 kg (104 lb)  Body mass index is 19.02 kg/(m^2).       Physical Examination:  General: Awake in no apparent distress, patient is deaf  Head/Neck Normocephalic and atraumatic, No Nuchal " Rigidity  Eyes:  PERRL; EOMi with anicteric sclera and clear conjunctivae  Mouth:  Oropharynx clear and without exudate; moist mucous membranes  Cardio:  Regular rate and rhythm with normal S1 and S2; no murmurs or rubs  Resp:  CTAB; respirations unlabored; no wheezes, crackles or rhonchi  Abdom: Soft, NTND with normoactive bowel sounds  Extrem: Warm and well-perfused with no clubbing, cyanosis or edema  Skin:  No rashes, lesions, or color changes  Pulses: 2+ and symmetric distally  Neuro:  Awake, responds to light touch, can answer some questions by lip reading, moving all limbs spontaneously, sensation to light touch intact throughout, no focal deficits    Laboratory:  Most Recent Data:  CBC: Lab Results   Component Value Date    WBC 15.20 (H) 01/11/2017    HGB 11.2 (L) 01/11/2017    HCT 33.5 (L) 01/11/2017     (H) 01/11/2017    MCV 83 01/11/2017    RDW 14.8 (H) 01/11/2017     BMP: Lab Results   Component Value Date     (L) 01/11/2017    K 4.0 01/11/2017     01/11/2017    CO2 20 (L) 01/11/2017    BUN 19 01/11/2017    CREATININE 0.8 01/11/2017     (H) 01/11/2017    CALCIUM 8.5 (L) 01/11/2017    MG 1.9 01/11/2017     LFTs: Lab Results   Component Value Date    PROT 6.8 01/11/2017    ALBUMIN 3.6 01/11/2017    BILITOT 0.6 01/11/2017    AST 28 01/11/2017    ALKPHOS 59 01/11/2017    ALT 13 01/11/2017     Coags:   Lab Results   Component Value Date    INR 1.0 11/28/2016     FLP: Lab Results   Component Value Date    CHOL 153 01/03/2017    HDL 62 01/03/2017    LDLCALC 71.6 01/03/2017    TRIG 97 01/03/2017    CHOLHDL 40.5 01/03/2017     DM: Lab Results   Component Value Date    HGBA1C 6.4 (H) 09/01/2016    LDLCALC 71.6 01/03/2017    CREATININE 0.8 01/11/2017     Thyroid: Lab Results   Component Value Date    TSH 1.903 01/03/2017     Anemia: No results found for: IRON, TIBC, FERRITIN, [id number], FOLATE  Cardiac: Lab Results   Component Value Date    TROPONINI 0.010 01/11/2017     (H)  01/11/2017     Urinalysis: Lab Results   Component Value Date    LABURIN No significant growth 01/09/2017    COLORU Yellow 01/09/2017    SPECGRAV 1.020 01/09/2017    NITRITE Negative 01/09/2017    KETONESU Negative 01/09/2017    UROBILINOGEN Negative 01/09/2017    WBCUA 5 01/09/2017       Trended Cardiac Data:    Recent Labs  Lab 01/10/17  1554 01/11/17  1700   TROPONINI  --  0.010   * 227*         Other Results:  EKG (my interpretation): 1/11/17 EKG: Normal Sinus. Prolonged QT    Radiology:  Imaging Results         X-Ray Chest 1 View (Final result) Result time:  01/11/17 18:41:33    Final result by Yee Cisneros MD (01/11/17 18:41:33)    Impression:        #1. As Above.        Electronically signed by: YEE CISNEROS MD  Date:     01/11/17  Time:    18:41     Narrative:    HISTORY: Coughing    COMPARISON: Chest one view 01/10/17    FINDINGS: No new lung opacities.  No pneumothorax. No change in the cardiopulmonary status of the patient when compared to the prior.            CT Head Without Contrast (Final result) Result time:  01/11/17 18:37:49    Final result by Yee Cisneros MD (01/11/17 18:37:49)    Impression:      #1. Suboptimal exam as above.  Given the limitations, no definitive acute intracranial abnormalities are identified.      Electronically signed by: YEE CISNEROS MD  Date:     01/11/17  Time:    18:37     Narrative:    HISTORY: Change in mental status    COMPARISON: CT head 11/28/16    FINDINGS: This is a suboptimal exam as the patient was unable to remain still/motionless during the acquisition of the images.  This produces artifact that reduces sensitivity and specificity.    There is a remote right basal ganglia lacunar-type infarct.  There is chronic white matter ischemic change.  The brain parenchyma is otherwise unremarkable with no evidence of hemorrhage, mass, or acute infarct.  Other than compensatory enlargement, the ventricular system demonstrates no distortion by mass  effect.      No extra-axial hemorrhage or mass. The extracranial structures are unremarkable.  The osseous structures are unremarkable.                 Assessment:     Sue Capone is a 89 y.o. female with:  Patient Active Problem List    Diagnosis Date Noted    Seizure 01/11/2017    Dementia without behavioral disturbance 09/01/2016    History of breast cancer 09/01/2016    Pre-diabetes 09/01/2016    Hypothyroidism 09/01/2016    Dyslipidemia 09/01/2016        Plan:     89 yo F w/ PMH Pneumonia and UTI discharged 1 day ago from ED on Levofloxacin, hypothyroidism, HLD, Breast CA w/ resection and radiation in 1995 with complaint of seizure x1 day presents with    Generalized Tonic Clonic Seizures  - DDx includes meningitis/encephalitis vs mass lesions vs epilepsy  - Tonic clonic seizures x2 witnessed, no loss bowel/bladder, first self resolved, seizure in ED resolved after Lorazepam 1mg IV  - AMS, fever and chills x2 days. Sent home from ED on 1/10/17 with Levofloxacin 750mg PO for pneumonia (CURB 65 =1)  - Initial vitals, afebrile 98.2F, HR 90, /74, SaO2 95% RA  - At admission, WBC 15.2, afebrile. No nuchal rigidity.   - F/u blood cx, LP results  - F/u MRI and EEG  - Consult Neurology (Ty[facility])  - Continue on Vancomycin/Azteronam/Bactrim/Acyclovir IV for meningitis/encephalitis coverage (penicillin allergy). Keppra loaded, continue Keppra.     Pneumonia  - 1/9/17 CXR: RLL consolidation  - At admission on exam, Lungs CTAB  - Continue Vancomycin/Azteronam/Bactrim    Normocytic Anemia  - At admission, H/H 11.2/33.5 MCV 83  - F/u iron, ferritin, B12, folate    Constipation  - As per family, no BM in 5 days  - Continue miralax PRN, colace. May add/titrate meds as needed    Hypothyroidism  - F/u TSH   - Continue home synthroid    HLD  - 1/3/17 Lipid Panel: TChol 153, LDL 71  - Continue home statin    DVT PPx: Lovenox  Diet: NPO  Code: DNR    Disposition: Pending clinical evaluation and clinical  ashley Bai  [facility] HO-I  [facility] Service    [facility] Hospitalist Pager numbers:   [facility] Hospitalist Medicine Team A (Benita/Elpidio): [phone]  [facility] Hospitalist Medicine Team B (Jolie/Jorge):  [phone]

## 2017-01-12 NOTE — PROGRESS NOTES
CASE MANAGEMENT NOTE  PATIENT DAUGHTER N LAW AT BEDSIDE TO ANSWER QUESTIONS. PATIENT LIVES WITH SON AND DAUGHTER N LAW. SHE STATES THEY PROVIDE ALL CARE TO HER.  AGNES WHITTINGTON  CASE MANAGEMENT

## 2017-01-12 NOTE — CONSULTS
"[facility] Neurology Consult Note    Reason for Consult - New onset of seizure      History of Present Illness -   89 yo F w/ PMHx hypothyroidism, HLD, DM, HTN and Cancer presented to ED after witnessed seizure activity. On 1/9/16, she presented to her PCP for change in baseline mental status including increased confusion and sleepiness, was found to have a UTI and recommended to follow-up with a CXR. She sent home on Levofloxacin 250mg PO for a UTI and referral for CXR. On 1/10/17 patient followed-up with a CXR and was told she had a possible pneumonia and was told to come to the ED. In the ED, patient was told she did have an ED and was discharged home with Levofloxacin 750mg PO. As per patient's son, yesterday around 3pm they heard the patient screaming loud and was found shaking and drooling. Shaking episode lasted for about 30 secs, self-resolved. Patient did no lose bowels or bladder. Episode was followed by a period of confusion with slow recovery of mental status back to baseline. Family called EMS, and on arrival to ED, patient again had an episode of shaking and drooling that lasted less than 1min. Family states for the past 2 days, patient has been complaining of feeling "hot" and "cold." Review of systems could not be obtained due to patient being deaf and AMS.  Son also reports that she had a fall and injured her head in November. She was seen in ED and had a negative CT of head.    Review of Systems -  Unable to obtain due to mental status.    Past Medical History - As HPI  Social History - Former smoker, quit 10 years ago.  Family History -  Allergies - NKDA  Home Neuro Medications -    Vitals -     Physical Exam -     Vitals:    01/12/17 0458 01/12/17 0500 01/12/17 0737 01/12/17 0816   BP: (!) 130/58  (!) 121/53    BP Location:       Patient Position: Lying  Lying    BP Method: Automatic  Automatic    Pulse: 75 80 74    Resp: 17  16    Temp: 98.9 °F (37.2 °C)  98.6 °F (37 °C)    TempSrc: Oral  Oral    SpO2: "    96%   Weight:       Height:           General appearance: Well nourished, well developed, no acute distress.  Facial Expression: normal       Affect: full       Orientation to time & place:  Patient deaf, difficult to assess mentation. Oriented to person    Speech:  normal (not dysarthric)  Cranial nerves: Pupils equal round and reactive, extraocular movements intact, facial sensation intact, face symmetrical, hearing impaired bilaterally, palate raises midline, tongue protrudes midline.  Musculoskeletal:  Muscle tone: all 4 extremities normal        Muscle Bulk: all 4 extremities normal        Muscle strength: Moves all 4 extremities spontaneously. Unable to test strength 2/2 poor participation.    Deep tendon Reflexes: 1+ bilateral biceps, triceps, patella, ankles    Cerebellar and Coordination:  Gait:  deferred    Finger-nose: no tested      MOVEMENT DISORDERS FOCUSED EXAM  Tremor present?   No       Labs -    Imaging -   CT head: #1. Suboptimal exam as above.  Given the limitations, no definitive acute intracranial abnormalities are identified.    CXR 01/10/17: Mild interstitial edema versus pneumonia    Assessment and Plan -   89 yo F w/ PMHx hypothyroidism, HLD, DM, HTN and breast cancer admitted with AMS and 2 witnessed seizure activity in setting of possible Pneumonia, UTI. There was nothing focal noted on exam suggestive of acute infarction, no nuchal rigidity though the exam was difficult due to poor participation.   CT head with no acute intracranial findings.    New onset of seizures/ Likely provoked seizure 2/2 systemic infection/aseptic meningitis.  -Tonic clonic seizures x2 witnessed, no loss bowel/bladder, no bitten tongue, first self resolved, 2nd seizure resolved after Lorazepam 1mg IV.   -Leukocytosis WBC 15.2, afebrile. No nuchal rigidity.  -Prolactin levels elevated.  -CSF Colorless, WBC 6, , Diff: 42% Neut, 53% Lymph, Glu 69, Prot 53   -Culture pending. HSV  pending    Recommendations:  -Obtain MRI with epilepsy protocol.  -EEG when infection cleared.  -Continue treating infection. Correct electrolytes derangement.  -Continue Keppra as ordered.  -Will follow up.    Discussed case with Dr Leon.    Bianca Davis MD  Neurology PGY-3

## 2017-01-12 NOTE — PLAN OF CARE
Problem: Physical Therapy Goal  Goal: Physical Therapy Goal  Goals to be met by: 17     Patient will increase functional independence with mobility by performin. Supine to sit with Moderate Assistance  2. Sit to supine with Moderate Assistance  3. Bed to chair transfer with Moderate Assistance   4. Sitting at edge of bed x15 minutes with Minimal Assistance  5. Lower extremity exercise program x 10-15 reps per handout, with assistance as needed    Recommendations: Recommend d/c home with 24/7 supervision/assistance for safety. Recommended DME includes hospital bed. Pts family declined HH PT/OT.   Outcome: Ongoing (interventions implemented as appropriate)     Recommendations: Recommend d/c home with 24/7 supervision/assistance for safety. Recommended DME includes hospital bed. Pt's family declined HH PT/OT.      Jaclyn Garg, PT  2017

## 2017-01-13 LAB
ANION GAP SERPL CALC-SCNC: 10 MMOL/L
ANION GAP SERPL CALC-SCNC: 10 MMOL/L
ANION GAP SERPL CALC-SCNC: 11 MMOL/L
BASOPHILS # BLD AUTO: 0.09 K/UL
BASOPHILS NFR BLD: 0.6 %
BUN SERPL-MCNC: 10 MG/DL
BUN SERPL-MCNC: 11 MG/DL
BUN SERPL-MCNC: 11 MG/DL
CALCIUM SERPL-MCNC: 8 MG/DL
CALCIUM SERPL-MCNC: 8.2 MG/DL
CALCIUM SERPL-MCNC: 8.2 MG/DL
CHLORIDE SERPL-SCNC: 96 MMOL/L
CHLORIDE SERPL-SCNC: 97 MMOL/L
CHLORIDE SERPL-SCNC: 98 MMOL/L
CO2 SERPL-SCNC: 14 MMOL/L
CO2 SERPL-SCNC: 16 MMOL/L
CO2 SERPL-SCNC: 19 MMOL/L
CREAT SERPL-MCNC: 0.8 MG/DL
DIFFERENTIAL METHOD: ABNORMAL
EOSINOPHIL # BLD AUTO: 0.2 K/UL
EOSINOPHIL NFR BLD: 1 %
ERYTHROCYTE [DISTWIDTH] IN BLOOD BY AUTOMATED COUNT: 14.3 %
EST. GFR  (AFRICAN AMERICAN): >60 ML/MIN/1.73 M^2
EST. GFR  (NON AFRICAN AMERICAN): >60 ML/MIN/1.73 M^2
GLUCOSE SERPL-MCNC: 101 MG/DL
GLUCOSE SERPL-MCNC: 136 MG/DL
GLUCOSE SERPL-MCNC: 98 MG/DL
HCT VFR BLD AUTO: 30.1 %
HGB BLD-MCNC: 10.1 G/DL
HSV1, PCR, CSF: NEGATIVE
HSV2, PCR, CSF: NEGATIVE
LYMPHOCYTES # BLD AUTO: 2.7 K/UL
LYMPHOCYTES NFR BLD: 17.1 %
MCH RBC QN AUTO: 27.6 PG
MCHC RBC AUTO-ENTMCNC: 33.6 %
MCV RBC AUTO: 82 FL
MONOCYTES # BLD AUTO: 1.7 K/UL
MONOCYTES NFR BLD: 10.6 %
NEUTROPHILS # BLD AUTO: 11.1 K/UL
NEUTROPHILS NFR BLD: 70.3 %
OSMOLALITY UR: 500 MOSM/KG
PLATELET # BLD AUTO: 312 K/UL
PMV BLD AUTO: 10.2 FL
POTASSIUM SERPL-SCNC: 3.8 MMOL/L
POTASSIUM SERPL-SCNC: 3.8 MMOL/L
POTASSIUM SERPL-SCNC: 3.9 MMOL/L
RBC # BLD AUTO: 3.66 M/UL
SODIUM SERPL-SCNC: 122 MMOL/L
SODIUM SERPL-SCNC: 124 MMOL/L
SODIUM SERPL-SCNC: 125 MMOL/L
SODIUM UR-SCNC: 72 MMOL/L
VANCOMYCIN TROUGH SERPL-MCNC: 13.9 UG/ML
WBC # BLD AUTO: 15.74 K/UL

## 2017-01-13 PROCEDURE — 25000003 PHARM REV CODE 250: Performed by: STUDENT IN AN ORGANIZED HEALTH CARE EDUCATION/TRAINING PROGRAM

## 2017-01-13 PROCEDURE — G8996 SWALLOW CURRENT STATUS: HCPCS | Mod: CK

## 2017-01-13 PROCEDURE — 80202 ASSAY OF VANCOMYCIN: CPT

## 2017-01-13 PROCEDURE — 63600175 PHARM REV CODE 636 W HCPCS: Performed by: INTERNAL MEDICINE

## 2017-01-13 PROCEDURE — G8998 SWALLOW D/C STATUS: HCPCS | Mod: CK

## 2017-01-13 PROCEDURE — C9254 INJECTION, LACOSAMIDE: HCPCS | Performed by: INTERNAL MEDICINE

## 2017-01-13 PROCEDURE — 80048 BASIC METABOLIC PNL TOTAL CA: CPT | Mod: 91

## 2017-01-13 PROCEDURE — 92526 ORAL FUNCTION THERAPY: CPT

## 2017-01-13 PROCEDURE — 85025 COMPLETE CBC W/AUTO DIFF WBC: CPT

## 2017-01-13 PROCEDURE — 11000001 HC ACUTE MED/SURG PRIVATE ROOM

## 2017-01-13 PROCEDURE — G8997 SWALLOW GOAL STATUS: HCPCS | Mod: CK

## 2017-01-13 PROCEDURE — 94761 N-INVAS EAR/PLS OXIMETRY MLT: CPT

## 2017-01-13 PROCEDURE — 92611 MOTION FLUOROSCOPY/SWALLOW: CPT

## 2017-01-13 PROCEDURE — 63600175 PHARM REV CODE 636 W HCPCS: Performed by: STUDENT IN AN ORGANIZED HEALTH CARE EDUCATION/TRAINING PROGRAM

## 2017-01-13 PROCEDURE — 25000003 PHARM REV CODE 250: Performed by: INTERNAL MEDICINE

## 2017-01-13 PROCEDURE — 83935 ASSAY OF URINE OSMOLALITY: CPT

## 2017-01-13 PROCEDURE — 84300 ASSAY OF URINE SODIUM: CPT

## 2017-01-13 PROCEDURE — 83930 ASSAY OF BLOOD OSMOLALITY: CPT

## 2017-01-13 PROCEDURE — 36415 COLL VENOUS BLD VENIPUNCTURE: CPT

## 2017-01-13 PROCEDURE — 97530 THERAPEUTIC ACTIVITIES: CPT

## 2017-01-13 RX ORDER — SODIUM CHLORIDE 9 MG/ML
INJECTION, SOLUTION INTRAVENOUS CONTINUOUS
Status: DISCONTINUED | OUTPATIENT
Start: 2017-01-13 | End: 2017-01-14

## 2017-01-13 RX ORDER — MOXIFLOXACIN HYDROCHLORIDE 400 MG/250ML
400 INJECTION, SOLUTION INTRAVENOUS
Status: DISCONTINUED | OUTPATIENT
Start: 2017-01-13 | End: 2017-01-15

## 2017-01-13 RX ADMIN — ACYCLOVIR SODIUM 500 MG: 50 INJECTION, SOLUTION INTRAVENOUS at 10:01

## 2017-01-13 RX ADMIN — DOCUSATE SODIUM 100 MG: 100 CAPSULE, LIQUID FILLED ORAL at 09:01

## 2017-01-13 RX ADMIN — SULFAMETHOXAZOLE AND TRIMETHOPRIM 78.72 MG: 80; 16 INJECTION, SOLUTION, CONCENTRATE INTRAVENOUS at 12:01

## 2017-01-13 RX ADMIN — SODIUM CHLORIDE: 0.9 INJECTION, SOLUTION INTRAVENOUS at 03:01

## 2017-01-13 RX ADMIN — VANCOMYCIN HYDROCHLORIDE 750 MG: 750 INJECTION, POWDER, LYOPHILIZED, FOR SOLUTION INTRAVENOUS at 03:01

## 2017-01-13 RX ADMIN — LEVETIRACETAM 1000 MG: 10 INJECTION INTRAVENOUS at 09:01

## 2017-01-13 RX ADMIN — SENNOSIDES 8.6 MG: 8.6 TABLET ORAL at 09:01

## 2017-01-13 RX ADMIN — ASPIRIN 81 MG: 81 TABLET, COATED ORAL at 09:01

## 2017-01-13 RX ADMIN — SODIUM CHLORIDE, PRESERVATIVE FREE 3 ML: 5 INJECTION INTRAVENOUS at 11:01

## 2017-01-13 RX ADMIN — SODIUM CHLORIDE 200 MG: 9 INJECTION, SOLUTION INTRAVENOUS at 02:01

## 2017-01-13 RX ADMIN — LEVOTHYROXINE SODIUM 75 MCG: 75 TABLET ORAL at 09:01

## 2017-01-13 RX ADMIN — POLYETHYLENE GLYCOL 3350 17 G: 17 POWDER, FOR SOLUTION ORAL at 09:01

## 2017-01-13 RX ADMIN — ACYCLOVIR SODIUM 500 MG: 50 INJECTION, SOLUTION INTRAVENOUS at 06:01

## 2017-01-13 RX ADMIN — SODIUM CHLORIDE, PRESERVATIVE FREE 3 ML: 5 INJECTION INTRAVENOUS at 02:01

## 2017-01-13 RX ADMIN — ENOXAPARIN SODIUM 40 MG: 100 INJECTION SUBCUTANEOUS at 02:01

## 2017-01-13 RX ADMIN — PRAVASTATIN SODIUM 20 MG: 20 TABLET ORAL at 09:01

## 2017-01-13 RX ADMIN — DOCUSATE SODIUM 100 MG: 100 CAPSULE, LIQUID FILLED ORAL at 08:01

## 2017-01-13 RX ADMIN — MOXIFLOXACIN HYDROCHLORIDE 400 MG: 400 INJECTION, SOLUTION INTRAVENOUS at 01:01

## 2017-01-13 RX ADMIN — AZTREONAM 2000 MG: 2 INJECTION, POWDER, LYOPHILIZED, FOR SOLUTION INTRAMUSCULAR; INTRAVENOUS at 04:01

## 2017-01-13 RX ADMIN — VANCOMYCIN HYDROCHLORIDE 750 MG: 750 INJECTION, POWDER, LYOPHILIZED, FOR SOLUTION INTRAVENOUS at 02:01

## 2017-01-13 RX ADMIN — SULFAMETHOXAZOLE AND TRIMETHOPRIM 78.72 MG: 80; 16 INJECTION, SOLUTION, CONCENTRATE INTRAVENOUS at 09:01

## 2017-01-13 NOTE — PHYSICIAN QUERY
"PT Name: Sue Capone  MR #: [id number]    Physician Query Form - Pneumonia Clarification    Reviewer Yoanna Drake RN CDIS Ext [email or web address]     This form is a permanent document in the medical record.    Query Date:  January 13, 2017    By submitting this query, we are merely seeking further clarification of documentation. Please utilize your independent clinical judgment when addressing the question(s) below.  (The Medical record reflects the following:)   Indicators   Supporting Clinical Findings Location in Medical Record   x "Pneumonia" documented Pneumonia    1/10/17 patient followed-up with a CXR and was told she had a possible pneumonia and was told to come  to the ED  Discharged home with a CURD 65 =1 with Levofloxacin 750mg PO PN 01/13    H&P   x Chest X-Ray: CXR:      RLL consolidation   PN 01/13    PaO2=   PaCO2=    O2 sat=      Cultures :     x Treatment : Continue Vancomycin / Aztreonam / Bactrim     Moxifloxacin IVPB PN 01/13      MAR    Supplemental O2:     x Other Negative Procalcitonin <0.09   PN 01/13   Provider, please specify type of pneumonia.    [  ] Bacterial (Specify organism) PNA        [  ] Bacterial, Gram Negative organism PNA        [  ] Viral (Specify virus) PNA       [  ] Fungal (Specify organism) PNA       [  ] Aspiration PNA        [  ] Other type of pneumonia (Specify)        [ x ] Clinically undetermined    Please document in your progress notes daily for the duration of treatment, until resolved, and include in your discharge summary.                                                                                    "

## 2017-01-13 NOTE — PROGRESS NOTES
Pharmacy New Medication Education     Patient accepted medication education.     Patient does not want BEDSIDE DELIVERY. Justin (son) wants to continue with [facility] since patient has been getting all her medicines from there.    Pharmacy educated patient on the following medications, using the teach-back method.  Acyclovir  Asa  Aztreonam  Colace  Lovenox  Keppra  Synthroid  Miralax  Pravastatin  Senna  Bactrim  vancomycin     Learners of pharmacy medication education included:  Son (justin)     Patient +/- learner response:  verbalize understanding

## 2017-01-13 NOTE — PLAN OF CARE
Problem: Patient Care Overview  Goal: Plan of Care Review  Outcome: Ongoing (interventions implemented as appropriate)  Pt on RA with sats of 97%.  Will continue to monitor.

## 2017-01-13 NOTE — PLAN OF CARE
Problem: Patient Care Overview  Goal: Plan of Care Review  Outcome: Ongoing (interventions implemented as appropriate)  Patient on RA, no repiratory distress noted.  Will continue to monitor.

## 2017-01-13 NOTE — PROCEDURES
Modifed Barium Swallow Study  Speech Start Time: 0911  Speech Stop Time: 0929  Speech Total (min): 18 min  SLP Treatment Date: 01/13/17    Reason for Referral  Patient was referred for a Modified Barium Swallow Study to assess the efficiency of his/her swallow function, rule out aspiration and make recommendations regarding safe dietary consistencies, effective compensatory strategies, and safe eating environment.     Diagnosis   Seizures    Past Medical History   Diagnosis Date    Cancer     Diabetes mellitus type II     Hypertension     Thyroid disease      Past Surgical History   Procedure Laterality Date    Hysterectomy      Breast surgery      Shoulder surgery       left    Cholecystectomy          General Precautions: fall, deaf, hearing impaired  Current Respiratory Status:  (room air)    Recommendations  1. Dental Soft diet  2. NECTAR thick liquids  3. Upright for all po intake and remain upright for 20 minutes s/p po intake  4. Slow rate  5. Small sips and bites  6. Assistance with meals and thickener  7. Whole po medications 1-2 at a time as able or crushed in puree    Oral Peripheral Examination  Oral Musculature Evaluation  Dentition: upper and lower dentures, uses dentures to eat  Mucosal Quality: adequate  Mandibular Strength and Mobility: impaired  Oral Labial Strength and Mobility: WFL  Lingual Strength and Mobility: impaired protrusion, impaired strength  Velar Elevation: WFL  Buccal Strength and Mobility: WFL  Volitional Cough: not elicited  Volitional Swallow: decreased elevation/excursion upon palpation  Voice Prior to PO Intake: clear    Consistencies Assessed    THIN:- 5cc, 10cc, 20cc and self regulated sip via cup and straw x1  Rosenbek 8-Point Penetration-Aspiration Scale Score: 8: Material enters the airway, passes below the vocal folds, and no effort is made to eject.    NECTAR THICK:- sips of nectar thick liquid via straw x3  Rosenbek 8-Point Penetration-Aspiration Scale Score: 1:  Material does not enter the airway.    PUREE:- self fed tsp bites of pudding with barium paste x2  Rosenbek 8-Point Penetration-Aspiration Scale Score: 1: Material does not enter the airway.    DENTAL SOFT:- self fed tsp bites of peaches x2  Rosenbek 8-Point Penetration-Aspiration Scale Score: 1: Material does not enter the airway.    Oral Preparation / Oral Phase  Pt with mild oral-moderate oral dysphagia c/b decreased bolus formation, anterior loss of liquids, slow oral transit time, and decreased lingual strength/coordination resulting in pooling in the vallecula and piriform sinuses with all trials.     Pharyngeal Phase  Limited view of trachea throughout assessment 2/2 pt's shoulders. Pt presented with SILENT penetration to the level of the vocal chords during the swallow with immediate SILENT ASPIRATION after the swallow of thin liquids 2/2 decreased hyolaryngeal elevation/excursion. Pt made no attempt to clear. No significant pharyngeal residue. Pt required some cues to complete swallows. Significantly delayed pharyngeal swallow.     Cervical Esophageal Phase  View of esophageal phase obstructed by pt's shoulders.    Impressions  MBSS completed. View of trachea obstructed by pt's shoulders. Silent aspiration noted with thin liquids. Pooling in the piriform sinuses and vallecula noted with all trials before the trigger of a swallow. Full report to follow. Recommend: dental soft diet, NECTAR THICK liquids, Upright for all po intake and remain upright for 20 minutes s/p po intake, alternate bites and sips, slow rate, whole po medications 1-2 at a time. ST will f/u.    Prognosis/Plan/Education  Prognosis for improvement is poor 2/2 pt will not follow commands for swallowing exercises.     Recommendations discussed with MD.     SLP to provide education on MBSS results and recommendations and safe swallowing precautions.     ST will f/u to assess diet tolerance and ensure safe swallowing precautions.     Care Plan    SLP Goals        Problem: SLP Goal    Goal Priority Disciplines Outcome   SLP Goal     SLP Ongoing (interventions implemented as appropriate)   Description:  Short Term Goals: (updated)  1. Pt will tolerate a dental soft diet and NECTAR THICK liquids with no overt s/s of aspiration.   2. Patient, Family, and/or caregivers will demonstrate/verbalize 3 safe swallowing precautions.     Short Term Goals: (previous)  1. Pt will tolerate a dental soft diet and thin liquids with no overt s/s of aspiration. -discontinued                  G-Codes  Functional Assessment Tool Used: noms  Score: 4  Functional Limitations: Swallowing  Swallow Current Status (): CK  Swallow Goal Status ():   Swallow Discharge Status (): PARISA Marquis., CCC-SLP  01/13/2017

## 2017-01-13 NOTE — PROGRESS NOTES
[facility] Resident [facility] Progress Note    Subjective:      Patient unable to answer questions this AM due to deafness.     Objective:   Last 24 Hour Vital Signs:  BP  Min: 97/65  Max: 147/64  Temp  Av.3 °F (36.8 °C)  Min: 97.7 °F (36.5 °C)  Max: 98.7 °F (37.1 °C)  Pulse  Av.2  Min: 74  Max: 106  Resp  Av.2  Min: 15  Max: 23  SpO2  Av.8 %  Min: 96 %  Max: 100 %  I/O last 3 completed shifts:  In:  [P.O.:400; IV Piggyback:146]  Out: 800 [Urine:800]    Physical Examination:  Last 24 Hour Vital Signs:  BP  Min: 97/65  Max: 147/64  Temp  Av.3 °F (36.8 °C)  Min: 97.7 °F (36.5 °C)  Max: 98.7 °F (37.1 °C)  Pulse  Av.2  Min: 74  Max: 106  Resp  Av.2  Min: 15  Max: 23  SpO2  Av.8 %  Min: 96 %  Max: 100 %  Body mass index is 19.02 kg/(m^2).  I/O last 3 completed shifts:  In:  [P.O.:400; IV Piggyback:]  Out: 800 [Urine:800]    General: Sleeping in bed, in no apparent distress  Head:  Normocephalic and atraumatic  Eyes:  PERRL; EOMi with anicteric sclera and clear conjunctivae  Mouth:  Oropharynx clear and without exudate; moist mucous membranes  Cardio:  Regular rate and rhythm with normal S1 and S2; no murmurs or rubs  Resp:  CTAB; respirations unlabored; no wheezes, crackles or rhonchi  Abdom: Soft, NTND with normoactive bowel sounds  Extrem: Warm and well-perfused with no clubbing, cyanosis or edema  Skin:  No rashes, lesions, or color changes  Pulses: 2+ and symmetric distally  Neuro:  AAOx3; cooperative and pleasant with no focal deficits    Laboratory:  Laboratory Data  Pertinent Findings:  Recent Labs      01/10/17   1052  01/10/17   1554  17   1700  17   0459  17   0456   WBC  11.15  14.79*  15.20*  14.23*  15.74*   HGB  11.4*  11.6*  11.2*  9.7*  10.1*   HCT  34.5*  35.3*  33.5*  29.6*  30.1*   PLT  322  342  354*  287  312   MCV  84  84  83  84  82   RDW  15.1*  14.8*  14.8*  14.7*  14.3   GRAN  70.9  7.9*  71.3  10.5*  73.7*  11.2*   64.8  9.2*  70.3  11.1*   LYMPH  20.3  2.3  20.0  3.0  16.0*  2.4  20.7  3.0  17.1*  2.7   MONO  7.4  0.8  7.3  1.1*  8.9  1.4*  13.1  1.9*  10.6  1.7*   EOS  0.1  0.1  0.1  0.1  0.2   BASO  0.04  0.10  0.05  0.06  0.09   EOSINOPHIL  0.8  0.7  0.7  0.6  1.0   BASOPHIL  0.4  0.7  0.3  0.4  0.6       Recent Labs      01/10/17   1052  01/10/17   1554  01/11/17   1700  01/12/17   0459  01/13/17   0456   NA  134*  135*  133*  133*  125*   K  3.7  3.9  4.0  3.8  3.9   CL  102  102  103  103  96   CO2  25  23  20*  19*  19*   BUN  18  18  19  14  10   CREATININE  0.9  0.8  0.8  0.8  0.8     Recent Labs      01/11/17   1732   POCTGLUCOSE  145*     Recent Labs      01/10/17   1554  01/11/17   1700   PROT  7.3  6.8   ALBUMIN  3.6  3.6   BILITOT  0.5  0.6   AST  32  28   ALT  13  13   ALKPHOS  60  59       Microbiology Data  Pertinent Findings:  1/11/16 CSF PCR: no organisms seen, rare wbc    Other Results:  EKG (my interpretation): no new EKG    Radiology Data   Pertinent Findings (my interpretation):  - 1/11/17 MRI Brain: Motion limited examination, no intracranial mass lesion or acute pathology, mild chronic ischemic changes    Current Medications:     Infusions:        Scheduled:   acyclovir  10 mg/kg (Ideal) Intravenous Q8H    aspirin  81 mg Oral Daily    aztreonam  2,000 mg Intravenous Q6H    docusate sodium  100 mg Oral BID    enoxaparin  40 mg Subcutaneous Daily    levetiracetam IVPB  1,000 mg Intravenous Q12H    levothyroxine  75 mcg Oral Daily    polyethylene glycol  17 g Oral Daily    pravastatin  20 mg Oral Daily    senna  8.6 mg Oral Daily    sodium chloride 0.9%  3 mL Intravenous Q8H    trimethoprim-sulfamethoxasole (BACTRIM) IVPB (for non fluid restricted pts) (fixed ratio)  5 mg/kg/day Intravenous Q8H    vancomycin (VANCOCIN) IVPB  15 mg/kg Intravenous Q12H        PRN:      Antibiotics and Day Number of Therapy:  Vancomycin 750mg IV a12hr day 2  Bactrim 400-80mg IV q8hr Day 2  Aztreonam  2g IV q6hr Day 2  Acyclovir 10mg/kg IV q8 Day 2    Lines and Day Number of Therapy:  PIV x1 20G    Assessment:     Sue Capone is a 89 y.o.female with  Patient Active Problem List    Diagnosis Date Noted    Seizure 01/11/2017    Seizures 01/11/2017    Dementia without behavioral disturbance 09/01/2016    History of breast cancer 09/01/2016    Pre-diabetes 09/01/2016    Hypothyroidism 09/01/2016    Dyslipidemia 09/01/2016        Plan:     89 yo F w/ medical hx of Pneumonia and UTI discharged 1 day ago from ED on Levofloxacin, hypothyroidism, HLD, Breast CA w/ resection and radiation in 1995 with complaint of seizure x1 day presents with      Generalized Tonic Clonic Seizures  - DDx includes meningitis/encephalitis vs mass lesions vs epilepsy  - Tonic clonic seizures x2 witnessed, no loss bowel/bladder, first self resolved, seizure in ED resolved after Lorazepam 1mg IV  - AMS, fever and chills x2 days. Sent home from ED on 1/10/17 with Levofloxacin 750mg PO for pneumonia (CURB 65 =1)  - Initial vitals, afebrile 98.2F, HR 90, /74, SaO2 95% RA  - At admission, WBC 15.2, afebrile. No nuchal rigidity.   - 1/12/17 Blood cx x2: pending  - 1/11/17 LP results: Colorless, WBC 6, , Diff: 42% Neut, 53% Lymph, Glu 69, Prot 53  - 1/11/17 MRI Brain: Motion limited examination, no intracranial mass lesion or acute pathology, mild chronic ischemic changes  - F/u EEG and HSV CSF PCR  - Consult Neurology (Tyree): obtain MRI, EEG when infxn clears, continue tx infxn, continue keppra, will continue to follow  - Due to RBC in CSF, consider HSV over bacterial. F/u HSV PCR CSF  - Continue on Vancomycin / Aztreonam / Bactrim / Acyclovir IV for meningitis/encephalitis coverage (penicillin allergy). Keppra loaded, continue Keppra.       Pneumonia  - 1/9/17 CXR: RLL consolidation  - At admission on exam, Lungs CTAB  - Negative Procalcitonin <0.09  - Continue Vancomycin / Aztreonam / Bactrim      Normocytic Anemia  - At  admission, H/H 11.2/33.5 MCV 83  - 1/11/17 Iron 44, Ferritin 115. B12 and Folate wnl  - Consider treatment with PO iron supplementation after infxn clears      Constipation  - As per family, no BM in 5 days  - In ED, patient disempaced  - Continue miralax PRN, colace. Consider lactulose supp. May add/titrate meds as needed      Hypothyroidism  - 1/11/17 TSH: 2.219, FT4 1.18  - Continue home synthroid      HLD  - 1/3/17 Lipid Panel: TChol 153, LDL 71  - Continue home statin      DVT PPx: Lovenox  Diet: NPO  Code: DNR  Social: Lives at home. PT/OT: family refuses HH PT/OT. DME hospital bed      Disposition: Pending clinical evaluation and clinical improvement    Ming Bai  [facility] Internal Medicine HO-I  [facility] Internal Medicine Service Team B    [facility] Medicine Hospitalist Pager numbers:   [facility] Hospitalist Medicine Team A (Benita/Elpidio): [phone]  [facility] Hospitalist Medicine Team B (Jolie/Jorge):  [phone]

## 2017-01-13 NOTE — PLAN OF CARE
Problem: Physical Therapy Goal  Goal: Physical Therapy Goal  Goals to be met by: 17     Patient will increase functional independence with mobility by performin. Supine to sit with Moderate Assistance  2. Sit to supine with Moderate Assistance  3. Bed to chair transfer with Moderate Assistance   4. Sitting at edge of bed x15 minutes with Minimal Assistance  5. Lower extremity exercise program x 10-15 reps per handout, with assistance as needed    Recommendations: Recommend d/c home with / supervision/assistance for safety. Recommended DME includes hospital bed. Pts family declined  PT/OT.    Outcome: Ongoing (interventions implemented as appropriate)  Goals ongoing

## 2017-01-13 NOTE — PROGRESS NOTES
Hospital bed order, faxed to [facility], [place] will update anne, spoke with team, pt not ready for d/c today maybe over weekend.      Future Appointments  Date Time Provider Department Center   4/10/2017 11:00 AM [facility]

## 2017-01-13 NOTE — PLAN OF CARE
Problem: Patient Care Overview  Goal: Plan of Care Review  Outcome: Revised  Patient is hard of hearing.  Patient is confused.  Patient is pulling out IV lines and Telemetry monitor.  Family member is at bedside.  Patient went to MRI.  Patient is receiving multiple antibiotics.  Patient has thin skin and are easily bruised and torn.  Patient is incontinent of bowel and bladder and wears the diaper.  Lactulose enema given with a small bowel movement.  Safety maintained.  Will continue to monitor.

## 2017-01-13 NOTE — PLAN OF CARE
Cross Coverage Note    Called by RN re: patient agitated. EKG reveals QTc >460. Will try soft 2 point restraints.    Alyssa Henderson MD  [facility] Internal Medicine, [facility]  --  Team A Pager: [phone]  Team B Pager: [phone]

## 2017-01-13 NOTE — PROCEDURES
DATE OF STUDY:  01/12/2017    EEG NUMBER:  [id number].    REQUESTING PHYSICIAN:  Kory Dave M.D.    LOCATION OF SERVICE:  Room 517.    ELECTROENCEPHALOGRAM REPORT    METHODOLOGY:  Electroencephalographic (EEG) recording is recorded with   electrodes placed according to the International 10-20 placement system.  Thirty   two (32) channels of digital signal (sampling rate of 512/sec), including T1   and T2, were simultaneously recorded from the scalp and may include EKG, EMG,   and/or eye monitors.  Recording band pass was 0.1 to 512 Hz.  Digital video   recording of the patient is simultaneously recorded with the EEG.  The patient   is instructed to report clinical symptoms which may occur during the recording   session.  EEG and video recording are stored and archived in digital format.    Activation procedures, which include photic stimulation, hyperventilation and   instructing patients to perform simple tasks, are done in selected patients.    The EEG is displayed on a monitor screen and can be reviewed using different   montages.  Computer assisted-analysis is employed to detect spike and   electrographic seizure activity.   The entire record is submitted for computer   analysis.  The entire recording is visually reviewed, and the times identified   by computer analysis as being spikes or seizures are reviewed again.    Compressed spectral analysis (CSA) is also performed on the activity recorded   from each individual channel.  This is displayed as a power display of   frequencies from 0 to 30 Hz over time.   The CSA is reviewed looking for   asymmetries in power between homologous areas of the scalp, then compared with   the original EEG recording.    Ahandyhand software was also utilized in the review of this study.  This software   suite analyzes the EEG recording in multiple domains.  Coherence and rhythmicity   are computed to identify EEG sections which may contain organized seizures.    Each channel  undergoes analysis to detect the presence of spike and sharp waves   which have special and morphological characteristics of epileptic activity.  The   routine EEG recording is converted from special into frequency domain.  This is   then displayed comparing homologous areas to identify areas of significant   asymmetry.  Algorithm to identify non-cortically generated artifact is used to   separate artifact from the EEG.    EEG FINDINGS:  The patient was awake at the onset of the recording.  She was   lying quietly in bed awake and alert.  Background was a somewhat irregular 7 Hz   frequency seen in the occipital regions bilaterally.  A mixture of beta   frequency was seen in the mid and frontal regions.  Occasionally, intermixed   theta activities seen bilaterally.  There is no evidence of focal changes and no   spike or sharp wave activity was seen.  Near the end, the patient fell asleep   with the background becoming a diffuse mixture of theta, beta and some   low-amplitude delta, which was symmetrical over both hemispheres.    IMPRESSION:  Mildly abnormal EEG with slowing in the intrinsic rhythms   indicative of a mild and relatively static encephalopathy without focal changes   nor an epileptic process.    CLINICAL CORRELATION:  The patient is a 89-year-old female who had 2 witnessed   seizures which are described as screaming, body shaking and drooling.  This   tracing does show evidence for mild nonspecific encephalopathy and no evidence   for focal changes nor an epileptic process.      RR/SN  dd: 01/13/2017 13:01:56 (CST)  td: 01/13/2017 13:28:39 (CST)  Doc ID   #[id number]  Job ID #[id number]    CC:

## 2017-01-13 NOTE — PT/OT/SLP PROGRESS
Speech Language Pathology  Dysphagia Treatment    Sue Capone   MRN: [id number]   [id number] A    Admitting Diagnosis: Seizures    Diet recommendations: Solid Diet Level: Dental Soft Liquid Diet Level: Thin   · Feed only when awake/alert,   · HOB to 90 degrees,   · Small bites/sips, cue slow rate  · Check for pocketing/oral residue,   · Remain upright 30 minutes post meal,   · Meds whole 1 at a time and   · Assistance with meals  · Alternate bites and sips  **MBSS today to r/o aspiration    SLP Treatment Date: 17  Speech Start Time: 911     Speech Stop Time: 929     Speech Total (min): 18 min       TREATMENT BILLABLE MINUTES:  Treatment Swallowing Dysfunction 18    Has the patient been evaluated by SLP for swallowing? : Yes  Keep patient NPO?: No   General Precautions: Standard, fall, deaf, hearing impaired  Current Respiratory Status:  (room air)       Subjective:  Pt awake with bilateral restraints. Family present in room reported she did not sleep last night and has not eaten. Breakfast tray present in room. RN present administering po medications.     Pain Ratin/10  Pain Rating Post-Intervention: 0/10    Objective:   Patient found with: telemetry, restraints    Pt seen to assess diet tolerance and determine possible need for MBSS to objectively r/o aspiration. HOB elevated. Pt with an inconsistent productive cough and wet/coarse breathing sounds when eating/drinking. Pt accepted bite of eggs x1, peaches x2, sips of coffee via straw, and RN administered po medications. Pt presented with a productive/wet cough s/p one sip of coffee and s/p all po trials. Pt with excess chewing, slow oral transit time, decreased bolus formation, anterior loss of liquids. Pt chewed all po medications. CHest x-ray revealed right lower lobe consolidations. Respiratory noted pooling in pt's throat. Pt would benefit from a MBSS to objectively r/o aspiration and determine the least restrictive and safest po diet with possible  updated goals to follow pending results.     Assessment:  Sue Capone is a 89 y.o. female with a medical diagnosis of Seizures and presents with s/s of oropharyngeal dysphagia. SLP to complete MBSS.    Discharge recommendations: Discharge Facility/Level Of Care Needs: home     Goals:   SLP Goals        Problem: SLP Goal    Goal Priority Disciplines Outcome   SLP Goal     SLP Ongoing (interventions implemented as appropriate)   Description:  Short Term Goals:   1. Pt will tolerate a dental soft diet and thin liquids with no overt s/s of aspiration.                  Plan:   Patient to be seen Therapy Frequency: 3 x/week   Plan of Care expires: 02/10/17  Plan of Care reviewed with: patient, family  SLP Follow-up?: Yes  SLP - Next Visit Date: 01/13/17           Jennyfer Rivera CCC-SLP  01/13/2017

## 2017-01-13 NOTE — PLAN OF CARE
Problem: Patient Care Overview  Goal: Plan of Care Review  Outcome: Ongoing (interventions implemented as appropriate)  Pt babbling and talking nonsense. Will respond to certain questions but is very hard of hearing. Disoriented x4. Family member in room throughout night. Initial assessment documented per flowsheet. Pt was pulling at lines and taking of heart monitor, so orders for restraints were obtained. Pt is very bruised up with a lot of skin tears from pt pulling at her own skin. Cardiac monitoring continued. Safety maintained - will cont to monitor.

## 2017-01-13 NOTE — PT/OT/SLP PROGRESS
Physical Therapy  Treatment    Sue Capone   MRN: [id number]   Admitting Diagnosis: Seizures    PT Received On: 17  PT Start Time: 1334     PT Stop Time: 1346    PT Total Time (min): 12 min       Billable Minutes:  Therapeutic Activity 12    Treatment Type: Treatment  PT/PTA: PTA     PTA Visit Number: 1       General Precautions: Standard, fall, deaf  Orthopedic Precautions: N/A   Braces:      Do you have any cultural, spiritual, Yazidism conflicts, given your current situation?: No    Subjective:  Communicated with nsg prior to session.      Pain Ratin/10                   Objective:   Patient found with: restraints, telemetry    Functional Mobility:  Bed Mobility:        Transfers:       Gait:        Stairs:      Balance:   Static Sit:   Dynamic Sit:   Static Stand:   Dynamic stand:      Therapeutic Activities and Exercises: le PROM in available planes X 10-12 reps with rest periods, pt in B ue restraints      AM-PAC 6 CLICK MOBILITY  How much help from another person does this patient currently need?   1 = Unable, Total/Dependent Assistance  2 = A lot, Maximum/Moderate Assistance  3 = A little, Minimum/Contact Guard/Supervision  4 = None, Modified [place]/Independent    Turning over in bed (including adjusting bedclothes, sheets and blankets)?: 2  Sitting down on and standing up from a chair with arms (e.g., wheelchair, bedside commode, etc.): 2  Moving from lying on back to sitting on the side of the bed?: 2  Moving to and from a bed to a chair (including a wheelchair)?: 2  Need to walk in hospital room?: 1  Climbing 3-5 steps with a railing?: 1  Total Score: 10    AM-PAC Raw Score CMS G-Code Modifier Level of Impairment Assistance   6 % Total / Unable   7 - 9 CM 80 - 100% Maximal Assist   10 - 14 CL 60 - 80% Moderate Assist   15 - 19 CK 40 - 60% Moderate Assist   20 - 22 CJ 20 - 40% Minimal Assist   23 CI 1-20% SBA / CGA   24 CH 0% Independent/ Mod I     Patient left supine with all  lines intact, call button in reach and son present.    Assessment: Pt w/o active assistance and pleasant.  Sue Capone is a 89 y.o. female with a medical diagnosis of Seizures and presents with .    Rehab identified problem list/impairments: Rehab identified problem list/impairments: weakness, impaired endurance, impaired functional mobilty, decreased upper extremity function, decreased lower extremity function, impaired cognition, impaired skin    Rehab potential is fair.    Activity tolerance: Fair    Discharge recommendations: Discharge Facility/Level Of Care Needs: home     Barriers to discharge:      Equipment recommendations: Equipment Needed After Discharge: hospital bed     GOALS: see general POC  Physical Therapy Goals        Problem: Physical Therapy Goal    Goal Priority Disciplines Outcome Goal Variances Interventions   Physical Therapy Goal     PT/OT, PT Ongoing (interventions implemented as appropriate)     Description:  Goals to be met by: 17     Patient will increase functional independence with mobility by performin. Supine to sit with Moderate Assistance  2. Sit to supine with Moderate Assistance  3. Bed to chair transfer with Moderate Assistance   4. Sitting at edge of bed x15 minutes with Minimal Assistance  5. Lower extremity exercise program x 10-15 reps per handout, with assistance as needed    Recommendations: Recommend d/c home with / supervision/assistance for safety. Recommended DME includes hospital bed. Pt's family declined HH PT/OT.                 PLAN:    Patient to be seen 3 x/week  to address the above listed problems via therapeutic activities, therapeutic exercises, neuromuscular re-education  Plan of Care expires: 17  Plan of Care reviewed with: patient, son         Dipesh Phillips, PTA  2017

## 2017-01-13 NOTE — PROGRESS NOTES
"[facility] Neurology Progress Note    Reason for Consult - New onset of seizure      History of Present Illness -   89 yo F w/ PMHx hypothyroidism, HLD, DM, HTN and Cancer presented to ED after witnessed seizure activity. On 1/9/16, she presented to her PCP for change in baseline mental status including increased confusion and sleepiness, was found to have a UTI and recommended to follow-up with a CXR. She sent home on Levofloxacin 250mg PO for a UTI and referral for CXR. On 1/10/17 patient followed-up with a CXR and was told she had a possible pneumonia and was told to come to the ED. In the ED, patient was told she did have an ED and was discharged home with Levofloxacin 750mg PO. As per patient's son, yesterday around 3pm they heard the patient screaming loud and was found shaking and drooling. Shaking episode lasted for about 30 secs, self-resolved. Patient did no lose bowels or bladder. Episode was followed by a period of confusion with slow recovery of mental status back to baseline. Family called EMS, and on arrival to ED, patient again had an episode of shaking and drooling that lasted less than 1min. Family states for the past 2 days, patient has been complaining of feeling "hot" and "cold." Review of systems could not be obtained due to patient being deaf and AMS.  Son also reports that she had a fall and injured her head in November. She was seen in ED and had a negative CT of head.    S: Today patient seen and examined. Still confused. More alert. Pt's son reports that patient was agitated last night.     Review of Systems -  Unable to obtain due to mental status.    Past Medical History - As HPI  Social History - Former smoker, quit 10 years ago.  Family History -  Allergies - NKDA  Home Neuro Medications -    Vitals -     Physical Exam -     Vitals:    01/13/17 0745 01/13/17 0800 01/13/17 0830 01/13/17 1200   BP:    (!) 101/58   BP Location:       Patient Position:       BP Method:    Automatic   Pulse: 83 80 "  76   Resp:  18  18   Temp:  97.1 °F (36.2 °C)  98 °F (36.7 °C)   TempSrc:  Oral  Oral   SpO2:   97%    Weight:       Height:           General appearance: Well nourished, well developed, no acute distress.  Facial Expression: normal       Affect: full       Orientation to time & place:  Patient deaf, difficult to assess mentation. Oriented to person    Speech:  normal (not dysarthric)  Cranial nerves: Pupils equal round and reactive, extraocular movements intact, facial sensation intact, face symmetrical, hearing impaired bilaterally, palate raises midline, tongue protrudes midline.  Musculoskeletal:  Muscle tone: all 4 extremities normal        Muscle Bulk: all 4 extremities normal        Muscle strength: Moves all 4 extremities spontaneously. Unable to test strength 2/2 poor participation.    Deep tendon Reflexes: 1+ bilateral biceps, triceps, patella, ankles    Cerebellar and Coordination:  Gait:  deferred    Finger-nose: no tested      MOVEMENT DISORDERS FOCUSED EXAM  Tremor present?   No       Labs -    Imaging -   CT head: #1. Suboptimal exam as above.  Given the limitations, no definitive acute intracranial abnormalities are identified.    CXR 01/10/17: Mild interstitial edema versus pneumonia    Assessment and Plan -   89 yo F w/ PMHx hypothyroidism, HLD, DM, HTN and breast cancer admitted with AMS and 2 witnessed seizure activity in setting of possible Pneumonia, UTI. There was nothing focal noted on exam suggestive of acute infarction, no nuchal rigidity though the exam was difficult due to poor participation.   CT head with no acute intracranial findings.    New onset of seizures/ Likely provoked seizure 2/2 systemic infection/aseptic meningitis.  -Tonic clonic seizures x2 witnessed, no loss bowel/bladder, no bitten tongue, first self resolved, 2nd seizure resolved after Lorazepam 1mg IV.   -Leukocytosis on admission WBC 15.2, 15.74, afebrile. No nuchal rigidity.  -Prolactin levels elevated.  -CSF  Colorless, WBC 6, , Diff: 42% Neut, 53% Lymph, Glu 69, Prot 53   -Culture pending. HSV pending    Recommendations:  -Obtain MRI with epilepsy protocol.  -EEG encephalopathic, but no epileptiform activity noted.  -Continue treating infection. Correct electrolytes derangement.  -Discontinue Keppra as patient became agitated yesterday. Load with Vimpat 200 mg x1 followed by maintenance dose 100 mg Q12h.  -Will follow up.    Discussed case with Dr Leon.    Bianca Davis MD  Neurology PGY-3

## 2017-01-13 NOTE — PLAN OF CARE
Problem: SLP Goal  Goal: SLP Goal  Short Term Goals: (updated)  1. Pt will tolerate a dental soft diet and NECTAR THICK liquids with no overt s/s of aspiration.   2. Patient, Family, and/or caregivers will demonstrate/verbalize 3 safe swallowing precautions.     Short Term Goals: (previous)  1. Pt will tolerate a dental soft diet and thin liquids with no overt s/s of aspiration. -discontinued   Outcome: Ongoing (interventions implemented as appropriate)  MBSS completed. View of trachea obstructed by pt's shoulders. Silent aspiration noted with thin liquids. Pooling in the piriform sinuses and vallecula noted with all trials before the trigger of a swallow. Full report to follow. Recommend: dental soft diet, NECTAR THICK liquids, Upright for all po intake and remain upright for 20 minutes s/p po intake, alternate bites and sips, slow rate, whole po medications 1-2 at a time. ST will f/u.  LUIS Kendrick, CCC-SLP  01/13/2017

## 2017-01-14 LAB
ALLENS TEST: ABNORMAL
ANION GAP SERPL CALC-SCNC: 10 MMOL/L
ANION GAP SERPL CALC-SCNC: 10 MMOL/L
ANION GAP SERPL CALC-SCNC: 11 MMOL/L
ANION GAP SERPL CALC-SCNC: 11 MMOL/L
BASOPHILS # BLD AUTO: 0.15 K/UL
BASOPHILS NFR BLD: 1.1 %
BUN SERPL-MCNC: 10 MG/DL
BUN SERPL-MCNC: 11 MG/DL
CALCIUM SERPL-MCNC: 7.8 MG/DL
CALCIUM SERPL-MCNC: 7.9 MG/DL
CHLORIDE SERPL-SCNC: 96 MMOL/L
CHLORIDE SERPL-SCNC: 97 MMOL/L
CO2 SERPL-SCNC: 16 MMOL/L
CO2 SERPL-SCNC: 17 MMOL/L
CORTIS SERPL-MCNC: 17.1 UG/DL
CREAT SERPL-MCNC: 0.7 MG/DL
CREAT SERPL-MCNC: 0.8 MG/DL
DELSYS: ABNORMAL
DIFFERENTIAL METHOD: ABNORMAL
EOSINOPHIL # BLD AUTO: 0.1 K/UL
EOSINOPHIL NFR BLD: 0.9 %
ERYTHROCYTE [DISTWIDTH] IN BLOOD BY AUTOMATED COUNT: 14.3 %
EST. GFR  (AFRICAN AMERICAN): >60 ML/MIN/1.73 M^2
EST. GFR  (NON AFRICAN AMERICAN): >60 ML/MIN/1.73 M^2
GLUCOSE SERPL-MCNC: 100 MG/DL
GLUCOSE SERPL-MCNC: 114 MG/DL
GLUCOSE SERPL-MCNC: 86 MG/DL
GLUCOSE SERPL-MCNC: 98 MG/DL
HCO3 UR-SCNC: 18.6 MMOL/L (ref 24–28)
HCT VFR BLD AUTO: 28.1 %
HGB BLD-MCNC: 9.5 G/DL
LYMPHOCYTES # BLD AUTO: 1.7 K/UL
LYMPHOCYTES NFR BLD: 12.6 %
MCH RBC QN AUTO: 27.6 PG
MCHC RBC AUTO-ENTMCNC: 33.8 %
MCV RBC AUTO: 82 FL
MONOCYTES # BLD AUTO: 1.5 K/UL
MONOCYTES NFR BLD: 10.5 %
NEUTROPHILS # BLD AUTO: 10.3 K/UL
NEUTROPHILS NFR BLD: 74.9 %
OSMOLALITY SERPL: 256 MOSM/KG
PCO2 BLDA: 27.7 MMHG (ref 35–45)
PH SMN: 7.43 [PH] (ref 7.35–7.45)
PLATELET # BLD AUTO: 312 K/UL
PMV BLD AUTO: 11 FL
PO2 BLDA: 69 MMHG (ref 80–100)
POC BE: -6 MMOL/L
POC SATURATED O2: 94 % (ref 95–100)
POC TCO2: 19 MMOL/L (ref 23–27)
POTASSIUM SERPL-SCNC: 3.6 MMOL/L
POTASSIUM SERPL-SCNC: 3.7 MMOL/L
RBC # BLD AUTO: 3.44 M/UL
SAMPLE: ABNORMAL
SITE: ABNORMAL
SODIUM SERPL-SCNC: 123 MMOL/L
SODIUM SERPL-SCNC: 123 MMOL/L
SODIUM SERPL-SCNC: 124 MMOL/L
SODIUM SERPL-SCNC: 124 MMOL/L
WBC # BLD AUTO: 13.78 K/UL

## 2017-01-14 PROCEDURE — 82803 BLOOD GASES ANY COMBINATION: CPT

## 2017-01-14 PROCEDURE — 36415 COLL VENOUS BLD VENIPUNCTURE: CPT

## 2017-01-14 PROCEDURE — 25000003 PHARM REV CODE 250: Performed by: INTERNAL MEDICINE

## 2017-01-14 PROCEDURE — 36600 WITHDRAWAL OF ARTERIAL BLOOD: CPT

## 2017-01-14 PROCEDURE — 63600175 PHARM REV CODE 636 W HCPCS: Performed by: STUDENT IN AN ORGANIZED HEALTH CARE EDUCATION/TRAINING PROGRAM

## 2017-01-14 PROCEDURE — 25000003 PHARM REV CODE 250: Performed by: STUDENT IN AN ORGANIZED HEALTH CARE EDUCATION/TRAINING PROGRAM

## 2017-01-14 PROCEDURE — 94761 N-INVAS EAR/PLS OXIMETRY MLT: CPT

## 2017-01-14 PROCEDURE — 82533 TOTAL CORTISOL: CPT

## 2017-01-14 PROCEDURE — 11000001 HC ACUTE MED/SURG PRIVATE ROOM

## 2017-01-14 PROCEDURE — 80048 BASIC METABOLIC PNL TOTAL CA: CPT | Mod: 91

## 2017-01-14 PROCEDURE — 85025 COMPLETE CBC W/AUTO DIFF WBC: CPT

## 2017-01-14 PROCEDURE — 97535 SELF CARE MNGMENT TRAINING: CPT

## 2017-01-14 PROCEDURE — 63600175 PHARM REV CODE 636 W HCPCS: Performed by: INTERNAL MEDICINE

## 2017-01-14 PROCEDURE — C9254 INJECTION, LACOSAMIDE: HCPCS | Performed by: INTERNAL MEDICINE

## 2017-01-14 PROCEDURE — 80048 BASIC METABOLIC PNL TOTAL CA: CPT

## 2017-01-14 RX ORDER — FUROSEMIDE 10 MG/ML
20 INJECTION INTRAMUSCULAR; INTRAVENOUS ONCE
Status: COMPLETED | OUTPATIENT
Start: 2017-01-14 | End: 2017-01-14

## 2017-01-14 RX ORDER — SODIUM CHLORIDE 9 MG/ML
INJECTION, SOLUTION INTRAVENOUS CONTINUOUS
Status: DISCONTINUED | OUTPATIENT
Start: 2017-01-14 | End: 2017-01-14

## 2017-01-14 RX ADMIN — FUROSEMIDE 20 MG: 10 INJECTION, SOLUTION INTRAMUSCULAR; INTRAVENOUS at 05:01

## 2017-01-14 RX ADMIN — SODIUM CHLORIDE, PRESERVATIVE FREE 3 ML: 5 INJECTION INTRAVENOUS at 06:01

## 2017-01-14 RX ADMIN — PRAVASTATIN SODIUM 20 MG: 20 TABLET ORAL at 09:01

## 2017-01-14 RX ADMIN — VANCOMYCIN HYDROCHLORIDE 750 MG: 750 INJECTION, POWDER, LYOPHILIZED, FOR SOLUTION INTRAVENOUS at 03:01

## 2017-01-14 RX ADMIN — ACYCLOVIR SODIUM 500 MG: 50 INJECTION, SOLUTION INTRAVENOUS at 06:01

## 2017-01-14 RX ADMIN — SODIUM CHLORIDE 100 MG: 9 INJECTION, SOLUTION INTRAVENOUS at 01:01

## 2017-01-14 RX ADMIN — SENNOSIDES 8.6 MG: 8.6 TABLET ORAL at 09:01

## 2017-01-14 RX ADMIN — SODIUM CHLORIDE TAB 1 GM 1 G: 1 TAB at 05:01

## 2017-01-14 RX ADMIN — LEVOTHYROXINE SODIUM 75 MCG: 75 TABLET ORAL at 09:01

## 2017-01-14 RX ADMIN — SODIUM CHLORIDE, PRESERVATIVE FREE 3 ML: 5 INJECTION INTRAVENOUS at 09:01

## 2017-01-14 RX ADMIN — ASPIRIN 81 MG: 81 TABLET, COATED ORAL at 09:01

## 2017-01-14 RX ADMIN — SODIUM CHLORIDE 100 MG: 9 INJECTION, SOLUTION INTRAVENOUS at 12:01

## 2017-01-14 RX ADMIN — ENOXAPARIN SODIUM 40 MG: 100 INJECTION SUBCUTANEOUS at 12:01

## 2017-01-14 RX ADMIN — DOCUSATE SODIUM 100 MG: 100 CAPSULE, LIQUID FILLED ORAL at 08:01

## 2017-01-14 RX ADMIN — SODIUM CHLORIDE, PRESERVATIVE FREE 3 ML: 5 INJECTION INTRAVENOUS at 01:01

## 2017-01-14 RX ADMIN — DOCUSATE SODIUM 100 MG: 100 CAPSULE, LIQUID FILLED ORAL at 09:01

## 2017-01-14 RX ADMIN — POLYETHYLENE GLYCOL 3350 17 G: 17 POWDER, FOR SOLUTION ORAL at 09:01

## 2017-01-14 RX ADMIN — VANCOMYCIN HYDROCHLORIDE: 750 INJECTION, POWDER, LYOPHILIZED, FOR SOLUTION INTRAVENOUS at 04:01

## 2017-01-14 RX ADMIN — ACYCLOVIR SODIUM 500 MG: 50 INJECTION, SOLUTION INTRAVENOUS at 10:01

## 2017-01-14 RX ADMIN — MOXIFLOXACIN HYDROCHLORIDE 400 MG: 400 INJECTION, SOLUTION INTRAVENOUS at 12:01

## 2017-01-14 RX ADMIN — ACYCLOVIR SODIUM 500 MG: 50 INJECTION, SOLUTION INTRAVENOUS at 02:01

## 2017-01-14 NOTE — PROGRESS NOTES
Patient's granddaughter requesting medication for patient's cough and chest congestion.  SpO2 96-98% on room air.  Lung sounds to the RUL with expiratory crackles.  Notified Dr. Molina.  MD verbalized understanding.  Dr. Molina states team is starting rounds now and will see patient this morning. NAD noted at this time. Will continue to monitor.

## 2017-01-14 NOTE — PROGRESS NOTES
[facility] Resident [facility] Progress Note    Subjective:      Patient unable to answer questions this AM due to deafness. Per daughter at bedside patient continues with upper airway gurgling, ate a little bit last night. No other complaints.     Objective:   Last 24 Hour Vital Signs:  BP  Min: 101/58  Max: 123/45  Temp  Av.2 °F (36.8 °C)  Min: 96 °F (35.6 °C)  Max: 100 °F (37.8 °C)  Pulse  Av  Min: 76  Max: 94  Resp  Av.7  Min: 16  Max: 18  SpO2  Av %  Min: 97 %  Max: 97 %  I/O last 3 completed shifts:  In: 1446 [IV Piggyback:1446]  Out: 932 [Urine:932]    Physical Examination:  General: Sleeping in bed, in no apparent distress  Head:  Normocephalic and atraumatic  Eyes:  PERRL; EOMi with anicteric sclera and clear conjunctivae  Mouth:  Oropharynx clear and without exudate; moist mucous membranes  Cardio:  Regular rate and rhythm with normal S1 and S2; no murmurs or rubs  Resp:  CTAB; respirations unlabored; no wheezes, crackles or rhonchi  Abdom: Soft, NTND with normoactive bowel sounds  Extrem: Warm and well-perfused with no clubbing, cyanosis or edema  Skin:  No rashes, lesions, or color changes  Pulses: 2+ and symmetric distally  Neuro:  AAOx3; cooperative and pleasant with no focal deficits    Laboratory:  Laboratory Data  Pertinent Findings:    Recent Labs  Lab 01/10/17  1554 17  1700 17  0459 17  0456  17  1859 17  2342 17  0512   WBC 14.79* 15.20* 14.23* 15.74*  --   --   --  13.78*   HGB 11.6* 11.2* 9.7* 10.1*  --   --   --  9.5*   HCT 35.3* 33.5* 29.6* 30.1*  --   --   --  28.1*    354* 287 312  --   --   --  312   MCV 84 83 84 82  --   --   --  82   RDW 14.8* 14.8* 14.7* 14.3  --   --   --  14.3   * 133* 133* 125*  < > 124* 124* 124*   K 3.9 4.0 3.8 3.9  < > 3.8 3.6 3.7    103 103 96  < > 97 97 97   CO2 23 20* 19* 19*  < > 16* 16* 16*   BUN 18 19 14 10  < > 11 11 10   CREATININE 0.8 0.8 0.8 0.8  < > 0.8 0.8 0.7   GLU 91  122* 121* 136*  < > 98 86 100   PROT 7.3 6.8  --   --   --   --   --   --    ALBUMIN 3.6 3.6  --   --   --   --   --   --    BILITOT 0.5 0.6  --   --   --   --   --   --    AST 32 28  --   --   --   --   --   --    ALKPHOS 60 59  --   --   --   --   --   --    ALT 13 13  --   --   --   --   --   --    < > = values in this interval not displayed.    Microbiology Data  Pertinent Findings:  1/11 CSF PCR: no organisms seen, rare wbc  1/11 Blood Cx: NGTD    Other Results:  EKG (my interpretation): no new EKG    Radiology Data   Pertinent Findings (my interpretation):  No new studies    Current Medications:     Infusions:        Scheduled:   custom IVPB builder  500 mg Intravenous Q8H    aspirin  81 mg Oral Daily    docusate sodium  100 mg Oral BID    enoxaparin  40 mg Subcutaneous Daily    lacosamide (VIMPAT) IVPB  100 mg Intravenous Q12H    levothyroxine  75 mcg Oral Daily    moxifloxacin  400 mg Intravenous Q24H    polyethylene glycol  17 g Oral Daily    pravastatin  20 mg Oral Daily    senna  8.6 mg Oral Daily    sodium chloride 0.9%  3 mL Intravenous Q8H    vancomycin (VANCOCIN) IVPB  750 mg Intravenous Q12H        PRN:    Antibiotics and Day Number of Therapy:  Vancomycin 750mg IV a12hr day 3  Bactrim 400-80mg IV q8hr Day 3  Aztreonam 2g IV q6hr Day 3  Acyclovir 10mg/kg IV q8 Day 3    Lines and Day Number of Therapy:  PIV x1 20G    Assessment:     Sue Capone is a 89 y.o.female with  Patient Active Problem List    Diagnosis Date Noted    Seizure 01/11/2017    Seizures 01/11/2017    Dementia without behavioral disturbance 09/01/2016    History of breast cancer 09/01/2016    Pre-diabetes 09/01/2016    Hypothyroidism 09/01/2016    Dyslipidemia 09/01/2016        Plan:     89 yo F w/ medical hx of Pneumonia and UTI discharged 1 day ago from ED on Levofloxacin, hypothyroidism, HLD, Breast CA w/ resection and radiation in 1995 with complaint of seizure x1 day presents with      Generalized Tonic  Clonic Seizures  - DDx includes meningitis/encephalitis vs mass lesions vs epilepsy  - Tonic clonic seizures x2 witnessed, no loss bowel/bladder, first self resolved, seizure in ED resolved after Lorazepam 1mg IV  - AMS, fever and chills x2 days. Sent home from ED on 1/10/17 with Levofloxacin 750mg PO for pneumonia (CURB 65 =1)  - Initial vitals, afebrile 98.2F, HR 90, /74, SaO2 95% RA  - At admission, WBC 15.2, afebrile. No nuchal rigidity.   - 1/12/17 Blood cx x2: pending  - 1/11/17 LP results: Colorless, WBC 6, , Diff: 42% Neut, 53% Lymph, Glu 69, Prot 53  - 1/11/17 MRI Brain: Motion limited examination, no intracranial mass lesion or acute pathology, mild chronic ischemic changes  - F/u EEG and HSV CSF PCR  - Consult Neurology (Tyree): obtain MRI, EEG when infxn clears, continue tx infxn, continue keppra, will continue to follow  - Continue on Vancomycin / Moxi / Acyclovir IV for meningitis/encephalitis coverage (penicillin allergy).  - Continue vimpat as per neuro recs  - HSV negative, will stop acyclovir.      Pneumonia  - 1/9/17 CXR: RLL consolidation  - At admission on exam, Lungs CTAB  - Negative Procalcitonin <0.09  - Continue Vancomycin / Moxi    Hyponatremia  - noted 122 on 1/13  - correcting with NS  - urine osm >100, urine lytes pending  - this , will discuss continuing IVF vs fluid restriction with staff      Normocytic Anemia  - At admission, H/H 11.2/33.5 MCV 83  - 1/11/17 Iron 44, Ferritin 115. B12 and Folate wnl  - Consider treatment with PO iron supplementation after infxn clears      Constipation  - As per family, no BM in 5 days  - In ED, patient disimpacted  - Continue miralax PRN, colace. Consider lactulose supp. May add/titrate meds as needed  - BM reported 1/12      Hypothyroidism  - 1/11/17 TSH: 2.219, FT4 1.18  - Continue home synthroid      HLD  - 1/3/17 Lipid Panel: TChol 153, LDL 71  - Continue home statin      DVT PPx: Lovenox  Diet: NPO  Code: DNR  Social: Lives  at home. PT/OT: family refuses HH PT/OT. DME hospital bed      Disposition: Pending clinical improvement and workup of hyponatremia    Neptali Yepez  [facility] Internal Medicine HO-I  [facility] Internal Medicine Service Team B    [facility] Medicine Hospitalist Pager numbers:   [facility] Hospitalist Medicine Team A (Benita/Elpidio): [phone]  [facility] Hospitalist Medicine Team B (Jolie/Jorge):  [phone]

## 2017-01-14 NOTE — PROGRESS NOTES
"[facility] Neurology Progress Note    Reason for Consult - New onset of seizure      History of Present Illness -   89 yo F w/ PMHx hypothyroidism, HLD, DM, HTN and Cancer presented to ED after witnessed seizure activity. On 1/9/16, she presented to her PCP for change in baseline mental status including increased confusion and sleepiness, was found to have a UTI and recommended to follow-up with a CXR. She sent home on Levofloxacin 250mg PO for a UTI and referral for CXR. On 1/10/17 patient followed-up with a CXR and was told she had a possible pneumonia and was told to come to the ED. In the ED, patient was told she did have an ED and was discharged home with Levofloxacin 750mg PO. As per patient's son, yesterday around 3pm they heard the patient screaming loud and was found shaking and drooling. Shaking episode lasted for about 30 secs, self-resolved. Patient did no lose bowels or bladder. Episode was followed by a period of confusion with slow recovery of mental status back to baseline. Family called EMS, and on arrival to ED, patient again had an episode of shaking and drooling that lasted less than 1min. Family states for the past 2 days, patient has been complaining of feeling "hot" and "cold." Review of systems could not be obtained due to patient being deaf and AMS.  Son also reports that she had a fall and injured her head in November. She was seen in ED and had a negative CT of head.    S: Today patient seen and examined. Still confused. No acute events overnight reported.    Review of Systems -  Unable to obtain due to mental status.    Past Medical History - As HPI  Social History - Former smoker, quit 10 years ago.  Family History -  Allergies - NKDA  Home Neuro Medications -    Vitals -     Physical Exam -     Vitals:    01/14/17 0354 01/14/17 0455 01/14/17 0500 01/14/17 0600   BP:  (!) 117/55     BP Location:  Left arm     Patient Position:  Lying     BP Method:  Automatic     Pulse:  88 87    Resp:  18   "   Temp:  100 °F (37.8 °C)     TempSrc:  Oral     SpO2: 97%   97%   Weight:       Height:           General appearance: Well nourished, well developed, no acute distress.  Facial Expression: normal       Affect: full       Orientation to time & place:  Patient deaf, difficult to assess mentation. Oriented to person    Speech:  normal (not dysarthric)  Cranial nerves: Pupils equal round and reactive, extraocular movements intact, facial sensation intact, face symmetrical, hearing impaired bilaterally, palate raises midline, tongue protrudes midline.  Musculoskeletal:  Muscle tone: all 4 extremities normal        Muscle Bulk: all 4 extremities normal        Muscle strength: Moves all 4 extremities spontaneously. Unable to test strength 2/2 poor participation.    Deep tendon Reflexes: 1+ bilateral biceps, triceps, patella, ankles    Cerebellar and Coordination:  Gait:  deferred    Finger-nose: no tested      MOVEMENT DISORDERS FOCUSED EXAM  Tremor present?   No       Labs -    Imaging -   CT head: #1. Suboptimal exam as above.  Given the limitations, no definitive acute intracranial abnormalities are identified.    CXR 01/10/17: Mild interstitial edema versus pneumonia    Assessment and Plan -   89 yo F w/ PMHx hypothyroidism, HLD, DM, HTN and breast cancer admitted with AMS and 2 witnessed seizure activity in setting of possible Pneumonia, UTI. There was nothing focal noted on exam suggestive of acute infarction, no nuchal rigidity though the exam was difficult due to poor participation.   CT head with no acute intracranial findings.    New onset of seizures/ Likely provoked seizure 2/2 systemic infection/ metabolic deragement.  -Tonic clonic seizures x2 witnessed, no loss bowel/bladder, no bitten tongue, first self resolved, 2nd seizure resolved after Lorazepam 1mg IV.   -Hyponatremia (Na 124)  -Leukocytosis on admission WBC 15.2, still elevated, afebrile. No nuchal rigidity.  -Prolactin levels elevated. Now back to  normal  -CSF Colorless, WBC 6, , Diff: 42% Neut, 53% Lymph, Glu 69, Prot 53   -CSF Culture NGTD. HSV PCR negative. Can d/c Acyclovir.    Recommendations:  -Obtain MRI with epilepsy protocol.  -EEG encephalopathic, but no epileptiform activity noted.  -Continue treating infection. Correct electrolytes derangement.  -Switched Keppra to Vimpat on 01/13/17 due to pt agitated.  -Continue Vimpat 100 mg Q12h.  -Will follow up.    Discussed case with Dr Leon.    Bianca Davis MD  Neurology PGY-3

## 2017-01-14 NOTE — PT/OT/SLP PROGRESS
Speech Language Pathology  Family Education    Sue Capone   MRN: [id number]   [id number]    Admitting Diagnosis: Seizures    Recommendations  1. Dental Soft diet  2. NECTAR thick liquids  3. Upright for all po intake and remain upright for 20 minutes s/p po intake  4. Slow rate  5. Small sips and bites  6. Assistance with meals and thickener  7. Whole po medications 1-2 at a time as able or crushed in pureev    SLP Treatment Date: 01/14/17  Speech Start Time: 1030     Speech Stop Time: 1050     Speech Total (min): 20 min       TREATMENT BILLABLE MINUTES:  Family education/Home care: 20 minutes    Has the patient been evaluated by SLP for swallowing? : Yes  Keep patient NPO?: No   General Precautions: Standard, fall, hearing impaired, nectar thick  Current Respiratory Status:  (room air)       Subjective:  Pt sleeping upon entry. Pt's son reported pt has not been sleeping often. SLP did not arouse pt. Extensive education provided.     Objective:   Pt sleeping upon SLP entry. Extensive education provided to pt's son on MBSS results, SLP recommendations, swallowing precautions, how to use thickener, types of thickener, s/s and risks of aspiration, and prognosis. Handouts provided on topics discussed. SLP answered all questions and concerns. Sample thickener packets provided to pt's son. He is concerned about pt being able to feed slef and suck nectar thick liquids through a straw. Son reported minimal rest, so diet tolerance to be assessed at a later date. RN reported pt tolerated breakfast this AM well. Son reported minimal po intake.     Assessment:  Sue Capone is a 89 y.o. female with a medical diagnosis of Seizures and presents with oropharyngeal dysphagia. ST will continue to follow.     Discharge recommendations: Discharge Facility/Level Of Care Needs: home     Goals:   SLP Goals        Problem: SLP Goal    Goal Priority Disciplines Outcome   SLP Goal     SLP Ongoing (interventions implemented as  appropriate)   Description:  Short Term Goals: (updated)  1. Pt will tolerate a dental soft diet and NECTAR THICK liquids with no overt s/s of aspiration.   2. Patient, Family, and/or caregivers will demonstrate/verbalize 3 safe swallowing precautions.     Short Term Goals: (previous)  1. Pt will tolerate a dental soft diet and thin liquids with no overt s/s of aspiration. -discontinued                  Plan:   Patient to be seen Therapy Frequency: 3 x/week   Plan of Care expires: 02/10/17  Plan of Care reviewed with: son  SLP Follow-up?: Yes  SLP - Next Visit Date: 01/16/17           Jennyfer Rivera CCC-SLP  01/14/2017

## 2017-01-14 NOTE — CONSULTS
Nephrology Consult    DATE OF ADMISSION:  1/11/2017  DATE OF CONSULT: 1/14/2017  REFERRING PHYSICIAN:  Kory Dave MD  REASON FOR CONSULTATION:  Hyponatremia    CC: seizure    HPI:  89 y.o. with PMH DM, HTN, hypothyroid presents with tonic clonic seizure witnessed by her  prior to presentation at our facility.  She had another witnessed seizure in ED.  Suspected to have PNA and some evidence of aspiration with thin liquids.  Her  says she is deaf at baseline but normally able to hold a conversation by reading lips.  She has been very confused since she has been admitted, he said that she was more combative yesterday but is more sleepy today.  She denies any c/o at this time.  Very difficult to communicate with 2/2 deafness and she falls asleep easily.  She was noted to have dropping Na since admission.  135 --> 133 --> 125 --> 122 --> 124.  Received several antibiotics with IVPB D5W.  Currently receiving Vanc and Avelox with D5W IVPB.  Infectious w/u underway and Neuro w/u pending.  No more seizures since admission.    PMH:   Past Medical History   Diagnosis Date    Cancer     Diabetes mellitus type II     Hypertension     Thyroid disease        Allergies:   Review of patient's allergies indicates:   Allergen Reactions    Codeine     Penicillins        Home Meds:   No current facility-administered medications on file prior to encounter.      Current Outpatient Prescriptions on File Prior to Encounter   Medication Sig Dispense Refill    aspirin (ECOTRIN) 81 MG EC tablet Take 81 mg by mouth once daily.      levothyroxine (SYNTHROID) 75 MCG tablet Take 1 tablet (75 mcg total) by mouth once daily. 30 tablet 11    pravastatin (PRAVACHOL) 20 MG tablet Take 1 tablet (20 mg total) by mouth once daily. 30 tablet 11       Inpatient Meds: Scheduled Meds:   aspirin  81 mg Oral Daily    docusate sodium  100 mg Oral BID    enoxaparin  40 mg Subcutaneous Daily    lacosamide (VIMPAT) IVPB  100 mg  Intravenous Q12H    levothyroxine  75 mcg Oral Daily    moxifloxacin  400 mg Intravenous Q24H    polyethylene glycol  17 g Oral Daily    pravastatin  20 mg Oral Daily    senna  8.6 mg Oral Daily    custom IV infusion builder   Intravenous Q12H    sodium chloride 0.9%  3 mL Intravenous Q8H     Continuous Infusions:   PRN Meds:.    FH: History reviewed. No pertinent family history.    SH:   Social History     Social History    Marital status:      Social History Main Topics    Smoking status: Former Smoker         PSxHx:   Past Surgical History   Procedure Laterality Date    Hysterectomy      Breast surgery      Shoulder surgery       left    Cholecystectomy         ROS:   10 point review of systems reviewed and otherwise negative except as above.    Physical Exam:   Temp:  [96 °F (35.6 °C)-100 °F (37.8 °C)] 97.1 °F (36.2 °C)  Pulse:  [76-94] 83  Resp:  [16-18] 16  SpO2:  [96 %-97 %] 96 %  BP: (101-123)/(45-69) 108/69  I/O last 3 completed shifts:  In: 1446 [IV Piggyback:1446]  Out: 932 [Urine:932]     Vital signs reviewed.  Gen - no acute distress, somnolent but arousable  HEENT - normocephalic, atraumatic, EOMI, hard of hearing  CVS - RRR, no murmurs or rubs  Resp - non-labored, clear to auscultation, no wheezes, rales, or rhonchi  Abd - soft, non-tender, non-distended, no rebound or guarding  Ext/Vascular - no cyanosis or edema  Skin - no rash or lesions  Neuro - somnolent but arousable, hard of hearing  Psych - somnolent and confused    Labs:   Recent Results (from the past 24 hour(s))   Basic metabolic panel    Collection Time: 01/13/17 12:55 PM   Result Value Ref Range    Sodium 122 (L) 136 - 145 mmol/L    Potassium 3.8 3.5 - 5.1 mmol/L    Chloride 98 95 - 110 mmol/L    CO2 14 (L) 23 - 29 mmol/L    Glucose 101 70 - 110 mg/dL    BUN, Bld 11 10 - 30 mg/dL    Creatinine 0.8 0.5 - 1.4 mg/dL    Calcium 8.2 (L) 8.7 - 10.5 mg/dL    Anion Gap 10 8 - 16 mmol/L    eGFR if African American >60 >60  mL/min/1.73 m^2    eGFR if non African American >60 >60 mL/min/1.73 m^2   VANCOMYCIN, TROUGH before 4th dose    Collection Time: 01/13/17  2:33 PM   Result Value Ref Range    Vancomycin-Trough 13.9 10.0 - 20.0 ug/mL   Osmolality    Collection Time: 01/13/17  2:33 PM   Result Value Ref Range    Osmolality 256 (LL) 275 - 295 mOsm/kg   Osmolality, urine    Collection Time: 01/13/17  2:55 PM   Result Value Ref Range    Osmolality, Ur 500 50 - 1200 mOsm/kg   Sodium, urine, random    Collection Time: 01/13/17  2:55 PM   Result Value Ref Range    Sodium Urine Random 72 20 - 250 mmol/L   Basic metabolic panel    Collection Time: 01/13/17  6:59 PM   Result Value Ref Range    Sodium 124 (L) 136 - 145 mmol/L    Potassium 3.8 3.5 - 5.1 mmol/L    Chloride 97 95 - 110 mmol/L    CO2 16 (L) 23 - 29 mmol/L    Glucose 98 70 - 110 mg/dL    BUN, Bld 11 10 - 30 mg/dL    Creatinine 0.8 0.5 - 1.4 mg/dL    Calcium 8.0 (L) 8.7 - 10.5 mg/dL    Anion Gap 11 8 - 16 mmol/L    eGFR if African American >60 >60 mL/min/1.73 m^2    eGFR if non African American >60 >60 mL/min/1.73 m^2   Basic metabolic panel    Collection Time: 01/13/17 11:42 PM   Result Value Ref Range    Sodium 124 (L) 136 - 145 mmol/L    Potassium 3.6 3.5 - 5.1 mmol/L    Chloride 97 95 - 110 mmol/L    CO2 16 (L) 23 - 29 mmol/L    Glucose 86 70 - 110 mg/dL    BUN, Bld 11 10 - 30 mg/dL    Creatinine 0.8 0.5 - 1.4 mg/dL    Calcium 7.9 (L) 8.7 - 10.5 mg/dL    Anion Gap 11 8 - 16 mmol/L    eGFR if African American >60 >60 mL/min/1.73 m^2    eGFR if non African American >60 >60 mL/min/1.73 m^2   Basic Metabolic Panel (BMP)    Collection Time: 01/14/17  5:12 AM   Result Value Ref Range    Sodium 124 (L) 136 - 145 mmol/L    Potassium 3.7 3.5 - 5.1 mmol/L    Chloride 97 95 - 110 mmol/L    CO2 16 (L) 23 - 29 mmol/L    Glucose 100 70 - 110 mg/dL    BUN, Bld 10 10 - 30 mg/dL    Creatinine 0.7 0.5 - 1.4 mg/dL    Calcium 7.9 (L) 8.7 - 10.5 mg/dL    Anion Gap 11 8 - 16 mmol/L    eGFR if African  American >60 >60 mL/min/1.73 m^2    eGFR if non African American >60 >60 mL/min/1.73 m^2   CBC auto differential    Collection Time: 01/14/17  5:12 AM   Result Value Ref Range    WBC 13.78 (H) 3.90 - 12.70 K/uL    RBC 3.44 (L) 4.00 - 5.40 M/uL    Hemoglobin 9.5 (L) 12.0 - 16.0 g/dL    Hematocrit 28.1 (L) 37.0 - 48.5 %    MCV 82 82 - 98 fL    MCH 27.6 27.0 - 31.0 pg    MCHC 33.8 32.0 - 36.0 %    RDW 14.3 11.5 - 14.5 %    Platelets 312 150 - 350 K/uL    MPV 11.0 9.2 - 12.9 fL    Gran # 10.3 (H) 1.8 - 7.7 K/uL    Lymph # 1.7 1.0 - 4.8 K/uL    Mono # 1.5 (H) 0.3 - 1.0 K/uL    Eos # 0.1 0.0 - 0.5 K/uL    Baso # 0.15 0.00 - 0.20 K/uL    Gran% 74.9 (H) 38.0 - 73.0 %    Lymph% 12.6 (L) 18.0 - 48.0 %    Mono% 10.5 4.0 - 15.0 %    Eosinophil% 0.9 0.0 - 8.0 %    Basophil% 1.1 0.0 - 1.9 %    Differential Method Automated      Imaging Results         Fl Modified Barium Swallow Speech (Final result) Result time:  01/13/17 11:46:25    Final result by Zaki Bond MD (01/13/17 11:46:25)    Impression:     As above.      Electronically signed by: ZAKI BOND MD  Date:     01/13/17  Time:    11:46     Narrative:    Modified barium swallow    Fluoroscopic assistance provided to speech pathologist for modified barium swallow evaluation.    Fluoroscopy time: 1 minute 36 seconds.    Findings: Examination limited due to patient immobility.  Note made of aspiration with thin liquids.  Please see speech pathologist report for detailed findings.            MRI Brain W WO Contrast (Final result) Result time:  01/12/17 12:43:36    Final result by Rajat Sotelo MD (01/12/17 12:43:36)    Impression:        Motion limited examination despite attempts to minimize and correct for artifact.     No compelling evidence of intracranial mass lesion or other acute pathology.    Mild chronic ischemic changes as above.        Electronically signed by: RAJAT SOTELO MD  Date:     01/12/17  Time:    12:43     Narrative:    MRI brain with  contrast    01/12/17 11:08:14    Accession# [id number]    CLINICAL INDICATION: 89 year old F with new onset seizures, history of breast cancer      TECHNIQUE: Multiplanar multisequence MR imaging of the brain was performed before and after the administration of 5 ml Gadavistintravenous contrast. Examination moderately degraded by patient motion artifact, despite attempts to minimize in correct for artifact. This particularly affects the latter post contrast sequences.    COMPARISON:  Head CT 01/11/2017    FINDINGS:    The ventricles are normal in size for age, without evidence of hydrocephalus.    Mild chronic microvascular ischemic changes at the supratentorial white matter with remote lacunar type infarct in the right corona radii the. Small remote right frontal infarct. No compelling evidence of mass lesion, hemorrhage, edema or recent or remote major vascular distribution infarction. No apparent abnormal postcontrast parenchymal or leptomeningeal enhancement.    No extra-axial blood or fluid collections.     T2 skull base flow voids are preserved. Bone marrow signal intensity is unremarkable.  Prior cataract surgery.            X-Ray Chest 1 View (Final result) Result time:  01/11/17 18:41:33    Final result by Yee Cisneros MD (01/11/17 18:41:33)    Impression:        #1. As Above.        Electronically signed by: YEE CISNEROS MD  Date:     01/11/17  Time:    18:41     Narrative:    HISTORY: Coughing    COMPARISON: Chest one view 01/10/17    FINDINGS: No new lung opacities.  No pneumothorax. No change in the cardiopulmonary status of the patient when compared to the prior.            CT Head Without Contrast (Final result) Result time:  01/11/17 18:37:49    Final result by Yee Cisneros MD (01/11/17 18:37:49)    Impression:      #1. Suboptimal exam as above.  Given the limitations, no definitive acute intracranial abnormalities are identified.      Electronically signed by: YEE CISNEROS MD  Date:      01/11/17  Time:    18:37     Narrative:    HISTORY: Change in mental status    COMPARISON: CT head 11/28/16    FINDINGS: This is a suboptimal exam as the patient was unable to remain still/motionless during the acquisition of the images.  This produces artifact that reduces sensitivity and specificity.    There is a remote right basal ganglia lacunar-type infarct.  There is chronic white matter ischemic change.  The brain parenchyma is otherwise unremarkable with no evidence of hemorrhage, mass, or acute infarct.  Other than compensatory enlargement, the ventricular system demonstrates no distortion by mass effect.      No extra-axial hemorrhage or mass. The extracranial structures are unremarkable.  The osseous structures are unremarkable.              A/P:   Hyponatremia  - UOsm consistent with SIADH, had lung process on presentation, but also was receiving several IVPB with hypotonic solutions.    - Will convert Vanc IVPB to NS, Vimpat is in NS, avelox is pre-mixed with D5W and cannot be changed  - AM cortisol and TSH acceptable  - free water restrict  - monitor serial Na Q6hrs  - goal rise in Na is no more than 6-8 per 24 hour period  - if no improvement this evening, will consider lasix dose  - no indication for 3% saline currently.       Acidemia, NAGMA  - suspect it may be from IVF and IVPB  - level is stable for now  - check ABG, may need to initiate oral supplement if no improvement is seen    Generalized Tonic Clonic Seizures  - felt to be due to systemic illness/infection and did not correlate temporally with Na drop  - if any new seizure activity while Na acutely drops, will need to transfer to ICU for 3% saline  - Neuro following with w/u    PNA, dysphagia, evidence of aspiration with thin liquids on MBSS  Anemia  Constipation  Hypothyroidism  HLD    Thank you for allowing me to participate in the care of your patient.  Please call with any questions.    Susana Mullen MD   Nephrology  [facility]  Office [phone]    Cross covering for:  [facility]  ADELINA Sweeney MD, KRISH SINGLETON MD,   MD BELGICA Wall, NP  [address]  [place], [address]  [phone]  After hours answering service: [phone]

## 2017-01-14 NOTE — PLAN OF CARE
Problem: SLP Goal  Goal: SLP Goal  Short Term Goals: (updated)  1. Pt will tolerate a dental soft diet and NECTAR THICK liquids with no overt s/s of aspiration.   2. Patient, Family, and/or caregivers will demonstrate/verbalize 3 safe swallowing precautions.     Short Term Goals: (previous)  1. Pt will tolerate a dental soft diet and thin liquids with no overt s/s of aspiration. -discontinued   Outcome: Ongoing (interventions implemented as appropriate)  Pt sleeping upon SLP entry. Extensive education provided to pt's son on MBSS results, SLP recommendations, swallowing precautions, how to use thickener, types of thickener, and prognosis. Handout provided. SLP answered all questions and concerns. Son reported minimal rest, so diet tolerance to be assessed at a later date. RN reported pt tolerated breakfast this AM well. Son reported minimal po intake.  PARISA Kendrick., CCC-SLP  01/14/2017

## 2017-01-14 NOTE — PROGRESS NOTES
Patient with a critically low serum osmolality level of 256.  Notified Dr. Laird.  MD verbalized understanding.  MD to review chart and d/c continuous IVF's.  Will continue to monitor.

## 2017-01-14 NOTE — PLAN OF CARE
Problem: Patient Care Overview  Goal: Plan of Care Review  Outcome: Ongoing (interventions implemented as appropriate)  Pt. on room air.  Will cont to monitor

## 2017-01-14 NOTE — PROGRESS NOTES
Nephrology Note    PM labs noted with worsening Na, will give a dose of lasix and start NaCl tablets, repeat labs tonight.  Convert all IVPB to NS when possible    Thank you for allowing me to participate in the care of your patient.  Please call with any questions.    Susana Mullen MD   Nephrology  [facility]  Office [phone]    Cross covering for:  [facility]  ADELINA Sweeney MD, KRISH SINGLETON MD,   MD BELGICA Wall, MOHINI  [address]  [place], [address]  [phone]  After hours answering service: [phone]

## 2017-01-15 LAB
ANION GAP SERPL CALC-SCNC: 11 MMOL/L
ANION GAP SERPL CALC-SCNC: 12 MMOL/L
ANION GAP SERPL CALC-SCNC: 13 MMOL/L
ANION GAP SERPL CALC-SCNC: 9 MMOL/L
BASOPHILS # BLD AUTO: 0.08 K/UL
BASOPHILS NFR BLD: 0.8 %
BUN SERPL-MCNC: 10 MG/DL
BUN SERPL-MCNC: 10 MG/DL
BUN SERPL-MCNC: 11 MG/DL
BUN SERPL-MCNC: 11 MG/DL
CALCIUM SERPL-MCNC: 7.6 MG/DL
CALCIUM SERPL-MCNC: 7.8 MG/DL
CALCIUM SERPL-MCNC: 8 MG/DL
CALCIUM SERPL-MCNC: 8 MG/DL
CHLORIDE SERPL-SCNC: 100 MMOL/L
CHLORIDE SERPL-SCNC: 98 MMOL/L
CHLORIDE SERPL-SCNC: 99 MMOL/L
CHLORIDE SERPL-SCNC: 99 MMOL/L
CO2 SERPL-SCNC: 15 MMOL/L
CO2 SERPL-SCNC: 16 MMOL/L
CO2 SERPL-SCNC: 17 MMOL/L
CO2 SERPL-SCNC: 17 MMOL/L
CREAT SERPL-MCNC: 0.7 MG/DL
CREAT SERPL-MCNC: 0.7 MG/DL
CREAT SERPL-MCNC: 0.8 MG/DL
CREAT SERPL-MCNC: 0.8 MG/DL
CREAT UR-MCNC: 60.3 MG/DL
DIFFERENTIAL METHOD: ABNORMAL
EOSINOPHIL # BLD AUTO: 0.3 K/UL
EOSINOPHIL NFR BLD: 2.6 %
ERYTHROCYTE [DISTWIDTH] IN BLOOD BY AUTOMATED COUNT: 14.2 %
EST. GFR  (AFRICAN AMERICAN): >60 ML/MIN/1.73 M^2
EST. GFR  (NON AFRICAN AMERICAN): >60 ML/MIN/1.73 M^2
GLUCOSE SERPL-MCNC: 100 MG/DL
GLUCOSE SERPL-MCNC: 67 MG/DL
GLUCOSE SERPL-MCNC: 83 MG/DL
GLUCOSE SERPL-MCNC: 92 MG/DL
HCT VFR BLD AUTO: 25.8 %
HGB BLD-MCNC: 9 G/DL
LYMPHOCYTES # BLD AUTO: 2 K/UL
LYMPHOCYTES NFR BLD: 18.5 %
MAGNESIUM SERPL-MCNC: 1.5 MG/DL
MCH RBC QN AUTO: 28.2 PG
MCHC RBC AUTO-ENTMCNC: 34.9 %
MCV RBC AUTO: 81 FL
MONOCYTES # BLD AUTO: 1.2 K/UL
MONOCYTES NFR BLD: 11.4 %
NEUTROPHILS # BLD AUTO: 7.1 K/UL
NEUTROPHILS NFR BLD: 66.4 %
OSMOLALITY UR: 279 MOSM/KG
PLATELET # BLD AUTO: 301 K/UL
PMV BLD AUTO: 10.1 FL
POTASSIUM SERPL-SCNC: 3.2 MMOL/L
POTASSIUM SERPL-SCNC: 3.2 MMOL/L
POTASSIUM SERPL-SCNC: 3.3 MMOL/L
POTASSIUM SERPL-SCNC: 3.4 MMOL/L
RBC # BLD AUTO: 3.19 M/UL
SODIUM SERPL-SCNC: 125 MMOL/L
SODIUM SERPL-SCNC: 126 MMOL/L
SODIUM SERPL-SCNC: 127 MMOL/L
SODIUM SERPL-SCNC: 128 MMOL/L
SODIUM UR-SCNC: 45 MMOL/L
VANCOMYCIN TROUGH SERPL-MCNC: 20.5 UG/ML
WBC # BLD AUTO: 10.65 K/UL

## 2017-01-15 PROCEDURE — 80048 BASIC METABOLIC PNL TOTAL CA: CPT

## 2017-01-15 PROCEDURE — 80202 ASSAY OF VANCOMYCIN: CPT

## 2017-01-15 PROCEDURE — 83735 ASSAY OF MAGNESIUM: CPT

## 2017-01-15 PROCEDURE — 25000003 PHARM REV CODE 250: Performed by: INTERNAL MEDICINE

## 2017-01-15 PROCEDURE — 11000001 HC ACUTE MED/SURG PRIVATE ROOM

## 2017-01-15 PROCEDURE — 84300 ASSAY OF URINE SODIUM: CPT

## 2017-01-15 PROCEDURE — 63600175 PHARM REV CODE 636 W HCPCS: Performed by: STUDENT IN AN ORGANIZED HEALTH CARE EDUCATION/TRAINING PROGRAM

## 2017-01-15 PROCEDURE — 85025 COMPLETE CBC W/AUTO DIFF WBC: CPT

## 2017-01-15 PROCEDURE — 25000003 PHARM REV CODE 250: Performed by: STUDENT IN AN ORGANIZED HEALTH CARE EDUCATION/TRAINING PROGRAM

## 2017-01-15 PROCEDURE — 82570 ASSAY OF URINE CREATININE: CPT

## 2017-01-15 PROCEDURE — 83935 ASSAY OF URINE OSMOLALITY: CPT

## 2017-01-15 PROCEDURE — 36415 COLL VENOUS BLD VENIPUNCTURE: CPT

## 2017-01-15 PROCEDURE — 63600175 PHARM REV CODE 636 W HCPCS: Performed by: INTERNAL MEDICINE

## 2017-01-15 PROCEDURE — C9254 INJECTION, LACOSAMIDE: HCPCS | Performed by: INTERNAL MEDICINE

## 2017-01-15 PROCEDURE — 94761 N-INVAS EAR/PLS OXIMETRY MLT: CPT

## 2017-01-15 RX ORDER — FUROSEMIDE 10 MG/ML
20 INJECTION INTRAMUSCULAR; INTRAVENOUS ONCE
Status: COMPLETED | OUTPATIENT
Start: 2017-01-15 | End: 2017-01-15

## 2017-01-15 RX ORDER — POTASSIUM CHLORIDE 7.45 MG/ML
10 INJECTION INTRAVENOUS
Status: COMPLETED | OUTPATIENT
Start: 2017-01-15 | End: 2017-01-16

## 2017-01-15 RX ORDER — POTASSIUM CHLORIDE 20 MEQ/1
40 TABLET, EXTENDED RELEASE ORAL ONCE
Status: DISCONTINUED | OUTPATIENT
Start: 2017-01-15 | End: 2017-01-15

## 2017-01-15 RX ORDER — SODIUM BICARBONATE 650 MG/1
650 TABLET ORAL 2 TIMES DAILY
Status: DISCONTINUED | OUTPATIENT
Start: 2017-01-15 | End: 2017-01-15

## 2017-01-15 RX ORDER — POTASSIUM CHLORIDE 20 MEQ/1
40 TABLET, EXTENDED RELEASE ORAL DAILY
Status: DISCONTINUED | OUTPATIENT
Start: 2017-01-16 | End: 2017-01-15

## 2017-01-15 RX ORDER — POTASSIUM CHLORIDE 20 MEQ/1
40 TABLET, EXTENDED RELEASE ORAL DAILY
Status: DISCONTINUED | OUTPATIENT
Start: 2017-01-15 | End: 2017-01-15

## 2017-01-15 RX ORDER — MAGNESIUM SULFATE HEPTAHYDRATE 40 MG/ML
2 INJECTION, SOLUTION INTRAVENOUS ONCE
Status: COMPLETED | OUTPATIENT
Start: 2017-01-15 | End: 2017-01-15

## 2017-01-15 RX ADMIN — POTASSIUM CHLORIDE 10 MEQ: 10 INJECTION, SOLUTION INTRAVENOUS at 11:01

## 2017-01-15 RX ADMIN — ENOXAPARIN SODIUM 40 MG: 100 INJECTION SUBCUTANEOUS at 11:01

## 2017-01-15 RX ADMIN — MAGNESIUM SULFATE IN WATER 2 G: 40 INJECTION, SOLUTION INTRAVENOUS at 02:01

## 2017-01-15 RX ADMIN — FUROSEMIDE 20 MG: 10 INJECTION, SOLUTION INTRAMUSCULAR; INTRAVENOUS at 11:01

## 2017-01-15 RX ADMIN — ACYCLOVIR SODIUM: 50 INJECTION, SOLUTION INTRAVENOUS at 02:01

## 2017-01-15 RX ADMIN — VANCOMYCIN HYDROCHLORIDE: 750 INJECTION, POWDER, LYOPHILIZED, FOR SOLUTION INTRAVENOUS at 03:01

## 2017-01-15 RX ADMIN — SODIUM CHLORIDE 50 MG: 9 INJECTION, SOLUTION INTRAVENOUS at 01:01

## 2017-01-15 RX ADMIN — SODIUM CHLORIDE 100 MG: 9 INJECTION, SOLUTION INTRAVENOUS at 01:01

## 2017-01-15 RX ADMIN — POTASSIUM CHLORIDE 10 MEQ: 10 INJECTION, SOLUTION INTRAVENOUS at 09:01

## 2017-01-15 RX ADMIN — ACYCLOVIR SODIUM 500 MG: 50 INJECTION, SOLUTION INTRAVENOUS at 05:01

## 2017-01-15 RX ADMIN — ACYCLOVIR SODIUM: 50 INJECTION, SOLUTION INTRAVENOUS at 10:01

## 2017-01-15 RX ADMIN — SODIUM CHLORIDE, PRESERVATIVE FREE 3 ML: 5 INJECTION INTRAVENOUS at 02:01

## 2017-01-15 NOTE — PROGRESS NOTES
Vancomycin dosage adjusted from 750 mg IV q12h to 500 mg q12h starting at 2100 1/15/16 due to elevated trough level of 20.5 mcg/ml (goal trough level of 15-20 mcg/ml).  Vancomycin trough level to be rechecked prior to 3rd dose of new regimen.

## 2017-01-15 NOTE — PROGRESS NOTES
U Internal Medicine Resident HO-I Progress Note    Subjective:      Patient unable to respond to questions due to mental status.     Objective:   Last 24 Hour Vital Signs:  BP  Min: 108/69  Max: 140/58  Temp  Av.2 °F (36.8 °C)  Min: 97.1 °F (36.2 °C)  Max: 99 °F (37.2 °C)  Pulse  Av.9  Min: 79  Max: 86  Resp  Av.8  Min: 16  Max: 18  SpO2  Av.9 %  Min: 96 %  Max: 98 %  Weight  Av.2 kg (104 lb)  Min: 47.2 kg (104 lb)  Max: 47.2 kg (104 lb)  I/O last 3 completed shifts:  In: 1160 [P.O.:60; I.V.:250; IV Piggyback:850]  Out:  [Urine:]    Physical Examination:  Last 24 Hour Vital Signs:  BP  Min: 108/69  Max: 140/58  Temp  Av.2 °F (36.8 °C)  Min: 97.1 °F (36.2 °C)  Max: 99 °F (37.2 °C)  Pulse  Av.9  Min: 79  Max: 86  Resp  Av.8  Min: 16  Max: 18  SpO2  Av.9 %  Min: 96 %  Max: 98 %  Weight  Av.2 kg (104 lb)  Min: 47.2 kg (104 lb)  Max: 47.2 kg (104 lb)  Body mass index is 19.02 kg/(m^2).  I/O last 3 completed shifts:  In: 1160 [P.O.:60; I.V.:250; IV Piggyback:850]  Out:  [Urine:]    General:  Sleeping in bed, in no apparent distress  Head:    Normocephalic and atraumatic  Eyes:    PERRL; EOMi with anicteric sclera and clear conjunctivae  Mouth:   Oropharynx clear and without exudate; moist mucous membranes  Cardio:  Regular rate and rhythm with normal S1 and S2; no murmurs or rubs  Resp:    CTAB; respirations unlabored; no wheezes, crackles or rhonchi  Abdom:  Soft, NTND with normoactive bowel sounds  Extrem:  Warm and well-perfused with no clubbing, cyanosis or edema  Skin:    No rashes, lesions, or color changes  Pulses:  2+ and symmetric distally  Neuro:   Responds to sternal rub, moves all extremities spontaneously.    Laboratory:  Laboratory Data  Pertinent Findings:  Recent Labs      17   0456  17   0512  01/15/17   0619   WBC  15.74*  13.78*  10.65   HGB  10.1*  9.5*  9.0*   HCT  30.1*  28.1*  25.8*   PLT  312  312  301   MCV  82  82   81*   RDW  14.3  14.3  14.2   GRAN  70.3  11.1*  74.9*  10.3*  66.4  7.1   LYMPH  17.1*  2.7  12.6*  1.7  18.5  2.0   MONO  10.6  1.7*  10.5  1.5*  11.4  1.2*   EOS  0.2  0.1  0.3   BASO  0.09  0.15  0.08   EOSINOPHIL  1.0  0.9  2.6   BASOPHIL  0.6  1.1  0.8       Recent Labs      01/13/17   2342  01/14/17   0512  01/14/17   1231  01/14/17   1750  01/14/17   2331   NA  124*  124*  123*  123*  126*   K  3.6  3.7  3.6  3.6  3.4*   CL  97  97  96  97  98   CO2  16*  16*  17*  16*  15*   BUN  11  10  11  11  10   CREATININE  0.8  0.7  0.8  0.8  0.8     No results for input(s): POCTGLUCOSE in the last 72 hours.  No results for input(s): PROT, ALBUMIN, BILITOT, AST, ALT, ALKPHOS in the last 72 hours.    Microbiology Data  Pertinent Findings:  No new data    Other Results:  EKG (my interpretation): no new ekg    Radiology Data   Pertinent Findings (my interpretation):  1/14/17 CXR: RLL consolidation, bilateral blunting of costophrenic angle. Stable from previous.    Current Medications:     Infusions:        Scheduled:   acyclovir  500 mg Intravenous Q8H    aspirin  81 mg Oral Daily    docusate sodium  100 mg Oral BID    enoxaparin  40 mg Subcutaneous Daily    lacosamide (VIMPAT) IVPB  100 mg Intravenous Q12H    levothyroxine  75 mcg Oral Daily    moxifloxacin  400 mg Intravenous Q24H    polyethylene glycol  17 g Oral Daily    pravastatin  20 mg Oral Daily    senna  8.6 mg Oral Daily    sodium chloride 0.9%  3 mL Intravenous Q8H    sodium chloride  1 g Oral BID    custom IVPB builder   Intravenous Q12H        PRN:      Antibiotics and Day Number of Therapy:  Acyclovir 500mg IV q8hr Day 4  Vancomycin 500mg IV q12hr Day 4  Moxifloxacin 400mg 24hr Day 3    Previously on: Aztreonam IV, Bacrtim IV    Lines and Day Number of Therapy:  PIV x2 20Gx2    Assessment:     Sue Capone is a 89 y.o.female with  Patient Active Problem List    Diagnosis Date Noted    Seizure 01/11/2017    Seizures 01/11/2017     Dementia without behavioral disturbance 09/01/2016    History of breast cancer 09/01/2016    Pre-diabetes 09/01/2016    Hypothyroidism 09/01/2016    Dyslipidemia 09/01/2016        Plan:     89 yo F w/ medical hx of Pneumonia and UTI discharged 1 day ago from ED on Levofloxacin, hypothyroidism, HLD, Breast CA w/ resection and radiation in 1995 with complaint of seizure x1 day presents with      Generalized Tonic Clonic Seizures  - DDx includes meningitis/encephalitis vs mass lesions vs epilepsy  - Tonic clonic seizures x2 witnessed, no loss bowel/bladder, first self resolved, seizure in ED resolved after Lorazepam 1mg IV  - AMS, fever and chills x2 days. Sent home from ED on 1/10/17 with Levofloxacin 750mg PO for pneumonia (CURB 65 =1)  - Initial vitals, afebrile 98.2F, HR 90, /74, SaO2 95% RA  - At admission, WBC 15.2, afebrile. No nuchal rigidity.   - 1/12/17 Blood cx x2: pending  - 1/11/17 LP results: Colorless, WBC 6, , Diff: 42% Neut, 53% Lymph, Glu 69, Prot 53  - 1/11/17 MRI Brain: Motion limited examination, no intracranial mass lesion or acute pathology, mild chronic ischemic changes  - F/u EEG and HSV CSF PCR  - Consult Neurology (Christian): MRI neg, consider MRI w/ epilespy protocol. EEG encephalopathic, but no epileptiform activity noted. Continue Vimpat 100mg Q12.  - Patient currelty on Vancomycin / Moxifloxacin / Acyclovir IV for meningitis/encephalitis coverage (penicillin allergy, also, previously given Aztreonam IV, Bactrim IV).  - LP HSV PCR negative, LP gram stain negative. Consider stepping down abx and discontinuing acyclovir.  - Continue Vimpat as per neurology recommendations. Consider decreasing dose as sedation might be side effect.     Pneumonia  - 1/9/17 CXR: RLL consolidation  - At admission on exam, Lungs CTAB  - Negative Procalcitonin <0.09  - Continue Vancomycin / Moxi. Consider stepping down Abx.     Hyponatremia  - 1/13/17 Na 122  - 1/13/17 Urine Osm 500, Urine Na 72.  -  Urine labs indicate possible SIADH vs RTA4.  Ruled out hypothyroid (TSH/T4 wnl), Amelia's (AM cortisol wnl)  - Patient with abdominal CXR. Consider Chest CT and work up to rule out neoplasm causing paraneoplastic syndrome.  - As per nephrology recommendations, continue free water restrict, convert IV Abx to NS. Monitor q6hr BMP, if no improvement, consider lasix. No indication for 3% saline at this time.  - Converted IV Abx to NS, continue salt tabs 1g PO BID. Given x1 Lasix.  - Continue to monitor BMP, titrate salt tabs, fluids or lasix as needed.     Non-Anion Gap Metabolic Acidosis  - 1/14/17 HCO3- 15, HCO3- decreasing since admission  - Possibly RTA, consider plasma renin activity, serum aldosterone, urine pH  - Continue to monitor BMP     Normocytic Anemia  - At admission, H/H 11.2/33.5 MCV 83  - 1/11/17 Iron 44, Ferritin 115. B12 and Folate wnl  - Consider treatment with PO iron supplementation after treatment for infxns      Constipation  - As per family, no BM in 5 days  - In ED, patient disimpacted  - Continue miralax PRN, colace. Consider lactulose supp. May add/titrate meds as needed  - BM reported 1/12      Hypothyroidism  - 1/11/17 TSH: 2.219, FT4 1.18  - Continue home synthroid      HLD  - 1/3/17 Lipid Panel: TChol 153, LDL 71  - Continue home statin      DVT PPx: Lovenox  Diet: NPO  Code: DNR  Social: Lives at home. PT/OT: family refuses HH PT/OT. DME hospital bed      Disposition: Pending clinical improvement and workup of hyponatremia    Ming Bai  [facility] Internal Medicine HO-I  [facility] Internal Medicine Service Team B    [facility] Medicine Hospitalist Pager numbers:   [facility] Hospitalist Medicine Team A (Benita/Elpidio): [phone]  [facility] Hospitalist Medicine Team B (Jolie/Jorge):  [phone]

## 2017-01-15 NOTE — PLAN OF CARE
Problem: Patient Care Overview  Goal: Plan of Care Review  Outcome: Ongoing (interventions implemented as appropriate)  Pt asleep, but when awake pt only makes eye contact and says few words that I can't understand. Family to remain at bedside throughout night. Initial assessment documented per flowsheet. Soft wrist restraints x2 continued. Pt on fluid restriction. Cardiac monitoring continued. Safety maintained - will cont to monitor.

## 2017-01-15 NOTE — NURSING
PT has been asleep since morning rounds. Pt is snoring.  Pts son states she had been this way since the previous shift. Pt was unable to take morning meds. MD notified. B/P 119/56, HR:78, RR:16, temp 97.8. Respirations even, unlabored. sats @97% on RA. No new orders noted. No seizure like activity noted.  No distress noted. Will monitor pt for any acute changes.

## 2017-01-15 NOTE — PROGRESS NOTES
Nephrology Progress Note    DATE OF ADMISSION:  1/11/2017  DATE OF CONSULT: 1/14/2017  REFERRING PHYSICIAN:  Kory Dave MD  REASON FOR CONSULTATION:  Hyponatremia    Still confused, still sleeping today.  Her daughter-in-law is at bedside and feels that she is more sleepy today than she has been. No seizures overnight.    Physical Exam:  Temp:  [97.6 °F (36.4 °C)-99 °F (37.2 °C)] 97.8 °F (36.6 °C)  Pulse:  [78-86] 78  Resp:  [16-18] 18  SpO2:  [95 %-98 %] 95 %  BP: (113-140)/(54-58) 119/56  I/O last 3 completed shifts:  In: 1160 [P.O.:60; I.V.:250; IV Piggyback:850]  Out: 2080 [Urine:2080]   Vital signs reviewed.  Gen - no acute distress, somnolent but arousable  HEENT - normocephalic, atraumatic, EOMI, hard of hearing  CVS - RRR, no murmurs or rubs  Resp - non-labored, clear to auscultation, no wheezes, rales, or rhonchi  Abd - soft, non-tender, non-distended, no rebound or guarding  Ext/Vascular - no cyanosis or edema  Skin - no rash or lesions  Neuro - somnolent but arousable, hard of hearing  Psych - somnolent and confused      Meds - Scheduled Meds:   acyclovir  500 mg Intravenous Q8H    aspirin  81 mg Oral Daily    docusate sodium  100 mg Oral BID    enoxaparin  40 mg Subcutaneous Daily    furosemide  20 mg Intravenous Once    lacosamide (VIMPAT) IVPB  100 mg Intravenous Q12H    levothyroxine  75 mcg Oral Daily    moxifloxacin  400 mg Intravenous Q24H    polyethylene glycol  17 g Oral Daily    potassium chloride  40 mEq Oral Once    pravastatin  20 mg Oral Daily    senna  8.6 mg Oral Daily    sodium chloride 0.9%  3 mL Intravenous Q8H    sodium chloride  1 g Oral TID    custom IVPB builder   Intravenous Q12H     Continuous Infusions:   PRN Meds:.    Labs - Results for MARILY MORELOS (MRN [id number]) as of 1/15/2017 09:40   Ref. Range 1/15/2017 06:19   WBC Latest Ref Range: 3.90 - 12.70 K/uL 10.65   RBC Latest Ref Range: 4.00 - 5.40 M/uL 3.19 (L)   Hemoglobin Latest Ref Range: 12.0 - 16.0 g/dL  9.0 (L)   Hematocrit Latest Ref Range: 37.0 - 48.5 % 25.8 (L)   MCV Latest Ref Range: 82 - 98 fL 81 (L)   MCH Latest Ref Range: 27.0 - 31.0 pg 28.2   MCHC Latest Ref Range: 32.0 - 36.0 % 34.9   RDW Latest Ref Range: 11.5 - 14.5 % 14.2   Platelets Latest Ref Range: 150 - 350 K/uL 301   MPV Latest Ref Range: 9.2 - 12.9 fL 10.1   Gran% Latest Ref Range: 38.0 - 73.0 % 66.4   Gran # Latest Ref Range: 1.8 - 7.7 K/uL 7.1   Lymph% Latest Ref Range: 18.0 - 48.0 % 18.5   Lymph # Latest Ref Range: 1.0 - 4.8 K/uL 2.0   Mono% Latest Ref Range: 4.0 - 15.0 % 11.4   Mono # Latest Ref Range: 0.3 - 1.0 K/uL 1.2 (H)   Eosinophil% Latest Ref Range: 0.0 - 8.0 % 2.6   Eos # Latest Ref Range: 0.0 - 0.5 K/uL 0.3   Basophil% Latest Ref Range: 0.0 - 1.9 % 0.8   Baso # Latest Ref Range: 0.00 - 0.20 K/uL 0.08   Sodium Latest Ref Range: 136 - 145 mmol/L 125 (L)   Potassium Latest Ref Range: 3.5 - 5.1 mmol/L 3.2 (L)   Chloride Latest Ref Range: 95 - 110 mmol/L 99   CO2 Latest Ref Range: 23 - 29 mmol/L 17 (L)   Anion Gap Latest Ref Range: 8 - 16 mmol/L 9   BUN, Bld Latest Ref Range: 10 - 30 mg/dL 10   Creatinine Latest Ref Range: 0.5 - 1.4 mg/dL 0.8   eGFR if non African American Latest Ref Range: >60 mL/min/1.73 m^2 >60   eGFR if  Latest Ref Range: >60 mL/min/1.73 m^2 >60   Glucose Latest Ref Range: 70 - 110 mg/dL 100   Calcium Latest Ref Range: 8.7 - 10.5 mg/dL 7.6 (L)   Differential Method Unknown Automated       A/P   Hyponatremia  - UOsm consistent with SIADH, lung process on presentation, but also was receiving several IVPB with hypotonic solutions.   - will give another dose of IV lasix this morning, continue NaCl tablets.  If she does not have significant improvement in Na this afternoon, will give a small dose of vaptan  - continue to monitor Na Q6hrs  - convert IVPB to NS when possible  - AM cortisol and TSH acceptable  - continue to free water restrict  - monitor serial Na Q6hrs  - goal rise in Na is no more than 6-8  per 24 hour period    Hypokalemia  - replace, check Mg level, recheck level this PM      Low bicarbonate level  - ABG consistent with Respiratory alkalosis     Generalized Tonic Clonic Seizures  - felt to be due to systemic illness/infection and did not correlate temporally with Na drop  - if any new seizure activity while Na acutely drops, will need to transfer to ICU for 3% saline  - Neuro following with w/u     PNA, dysphagia, evidence of aspiration with thin liquids on MBSS  Anemia  Constipation  Hypothyroidism  HLD     Thank you for allowing me to participate in the care of your patient. Please call with any questions.     Susana Mullen MD   Nephrology  [facility]  Office [phone]     Cross covering for:  [facility]  ADELINA Sweeney MD, FACROBERT,   KRISH Mccollum MD,   MD BELGICA Wall, NP  [address]  [place], [address]  [phone]  After hours answering service: [phone]

## 2017-01-16 PROBLEM — E87.1 HYPONATREMIA: Status: ACTIVE | Noted: 2017-01-16

## 2017-01-16 LAB
ANION GAP SERPL CALC-SCNC: 13 MMOL/L
ANION GAP SERPL CALC-SCNC: 14 MMOL/L
ANION GAP SERPL CALC-SCNC: 15 MMOL/L
ANION GAP SERPL CALC-SCNC: 17 MMOL/L
BASOPHILS # BLD AUTO: 0.07 K/UL
BASOPHILS NFR BLD: 0.5 %
BUN SERPL-MCNC: 12 MG/DL
BUN SERPL-MCNC: 13 MG/DL
BUN SERPL-MCNC: 15 MG/DL
BUN SERPL-MCNC: 16 MG/DL
CALCIUM SERPL-MCNC: 8.1 MG/DL
CALCIUM SERPL-MCNC: 8.1 MG/DL
CALCIUM SERPL-MCNC: 8.2 MG/DL
CALCIUM SERPL-MCNC: 8.3 MG/DL
CHLORIDE SERPL-SCNC: 100 MMOL/L
CHLORIDE SERPL-SCNC: 100 MMOL/L
CHLORIDE SERPL-SCNC: 101 MMOL/L
CHLORIDE SERPL-SCNC: 99 MMOL/L
CO2 SERPL-SCNC: 14 MMOL/L
CO2 SERPL-SCNC: 15 MMOL/L
CO2 SERPL-SCNC: 16 MMOL/L
CO2 SERPL-SCNC: 17 MMOL/L
CREAT SERPL-MCNC: 0.8 MG/DL
CREAT SERPL-MCNC: 0.9 MG/DL
DIFFERENTIAL METHOD: ABNORMAL
EOSINOPHIL # BLD AUTO: 0.1 K/UL
EOSINOPHIL NFR BLD: 0.6 %
ERYTHROCYTE [DISTWIDTH] IN BLOOD BY AUTOMATED COUNT: 14.5 %
EST. GFR  (AFRICAN AMERICAN): >60 ML/MIN/1.73 M^2
EST. GFR  (NON AFRICAN AMERICAN): 55 ML/MIN/1.73 M^2
EST. GFR  (NON AFRICAN AMERICAN): >60 ML/MIN/1.73 M^2
GLUCOSE SERPL-MCNC: 198 MG/DL
GLUCOSE SERPL-MCNC: 51 MG/DL
GLUCOSE SERPL-MCNC: 56 MG/DL
GLUCOSE SERPL-MCNC: 88 MG/DL
HCT VFR BLD AUTO: 31 %
HGB BLD-MCNC: 10.2 G/DL
LYMPHOCYTES # BLD AUTO: 1.6 K/UL
LYMPHOCYTES NFR BLD: 10.7 %
MCH RBC QN AUTO: 27.2 PG
MCHC RBC AUTO-ENTMCNC: 32.9 %
MCV RBC AUTO: 83 FL
MONOCYTES # BLD AUTO: 1.3 K/UL
MONOCYTES NFR BLD: 8.8 %
NEUTROPHILS # BLD AUTO: 11.7 K/UL
NEUTROPHILS NFR BLD: 79.1 %
PLATELET # BLD AUTO: 358 K/UL
PMV BLD AUTO: 10.2 FL
POTASSIUM SERPL-SCNC: 3.9 MMOL/L
POTASSIUM SERPL-SCNC: 4.1 MMOL/L
POTASSIUM SERPL-SCNC: 4.2 MMOL/L
POTASSIUM SERPL-SCNC: 4.2 MMOL/L
RBC # BLD AUTO: 3.75 M/UL
SODIUM SERPL-SCNC: 130 MMOL/L
SODIUM SERPL-SCNC: 131 MMOL/L
WBC # BLD AUTO: 14.82 K/UL

## 2017-01-16 PROCEDURE — 85025 COMPLETE CBC W/AUTO DIFF WBC: CPT

## 2017-01-16 PROCEDURE — 97530 THERAPEUTIC ACTIVITIES: CPT

## 2017-01-16 PROCEDURE — 25000003 PHARM REV CODE 250: Performed by: INTERNAL MEDICINE

## 2017-01-16 PROCEDURE — G8997 SWALLOW GOAL STATUS: HCPCS | Mod: CK

## 2017-01-16 PROCEDURE — 11000001 HC ACUTE MED/SURG PRIVATE ROOM

## 2017-01-16 PROCEDURE — 25000003 PHARM REV CODE 250: Performed by: STUDENT IN AN ORGANIZED HEALTH CARE EDUCATION/TRAINING PROGRAM

## 2017-01-16 PROCEDURE — 63600175 PHARM REV CODE 636 W HCPCS: Performed by: INTERNAL MEDICINE

## 2017-01-16 PROCEDURE — 80048 BASIC METABOLIC PNL TOTAL CA: CPT | Mod: 91

## 2017-01-16 PROCEDURE — 94667 MNPJ CHEST WALL 1ST: CPT

## 2017-01-16 PROCEDURE — G8998 SWALLOW D/C STATUS: HCPCS | Mod: CK

## 2017-01-16 PROCEDURE — 94668 MNPJ CHEST WALL SBSQ: CPT

## 2017-01-16 PROCEDURE — 36415 COLL VENOUS BLD VENIPUNCTURE: CPT

## 2017-01-16 PROCEDURE — 63600175 PHARM REV CODE 636 W HCPCS: Performed by: STUDENT IN AN ORGANIZED HEALTH CARE EDUCATION/TRAINING PROGRAM

## 2017-01-16 PROCEDURE — C9254 INJECTION, LACOSAMIDE: HCPCS | Performed by: INTERNAL MEDICINE

## 2017-01-16 PROCEDURE — 92526 ORAL FUNCTION THERAPY: CPT

## 2017-01-16 PROCEDURE — 94761 N-INVAS EAR/PLS OXIMETRY MLT: CPT

## 2017-01-16 RX ORDER — IBUPROFEN 200 MG
16 TABLET ORAL
Status: DISCONTINUED | OUTPATIENT
Start: 2017-01-16 | End: 2017-01-23 | Stop reason: HOSPADM

## 2017-01-16 RX ORDER — GLUCAGON 1 MG
1 KIT INJECTION
Status: DISCONTINUED | OUTPATIENT
Start: 2017-01-16 | End: 2017-01-23 | Stop reason: HOSPADM

## 2017-01-16 RX ORDER — GUAIFENESIN 100 MG/5ML
200 SOLUTION ORAL EVERY 4 HOURS
Status: DISCONTINUED | OUTPATIENT
Start: 2017-01-16 | End: 2017-01-23 | Stop reason: HOSPADM

## 2017-01-16 RX ORDER — IBUPROFEN 200 MG
24 TABLET ORAL
Status: DISCONTINUED | OUTPATIENT
Start: 2017-01-16 | End: 2017-01-23 | Stop reason: HOSPADM

## 2017-01-16 RX ORDER — SODIUM CITRATE AND CITRIC ACID MONOHYDRATE 334; 500 MG/5ML; MG/5ML
30 SOLUTION ORAL DAILY
Status: DISCONTINUED | OUTPATIENT
Start: 2017-01-16 | End: 2017-01-17

## 2017-01-16 RX ORDER — GUAIFENESIN 600 MG/1
600 TABLET, EXTENDED RELEASE ORAL 2 TIMES DAILY
Status: DISCONTINUED | OUTPATIENT
Start: 2017-01-16 | End: 2017-01-16

## 2017-01-16 RX ORDER — MOXIFLOXACIN HYDROCHLORIDE 400 MG/250ML
400 INJECTION, SOLUTION INTRAVENOUS
Status: DISCONTINUED | OUTPATIENT
Start: 2017-01-16 | End: 2017-01-23

## 2017-01-16 RX ORDER — DEXTROSE MONOHYDRATE AND SODIUM CHLORIDE 5; .9 G/100ML; G/100ML
INJECTION, SOLUTION INTRAVENOUS CONTINUOUS
Status: DISCONTINUED | OUTPATIENT
Start: 2017-01-16 | End: 2017-01-16

## 2017-01-16 RX ADMIN — ACYCLOVIR SODIUM: 50 INJECTION, SOLUTION INTRAVENOUS at 06:01

## 2017-01-16 RX ADMIN — SODIUM CHLORIDE 50 MG: 9 INJECTION, SOLUTION INTRAVENOUS at 01:01

## 2017-01-16 RX ADMIN — POTASSIUM CHLORIDE 10 MEQ: 10 INJECTION, SOLUTION INTRAVENOUS at 03:01

## 2017-01-16 RX ADMIN — ASPIRIN 81 MG: 81 TABLET, COATED ORAL at 09:01

## 2017-01-16 RX ADMIN — POLYETHYLENE GLYCOL 3350 17 G: 17 POWDER, FOR SOLUTION ORAL at 09:01

## 2017-01-16 RX ADMIN — ACYCLOVIR SODIUM: 50 INJECTION, SOLUTION INTRAVENOUS at 03:01

## 2017-01-16 RX ADMIN — DOCUSATE SODIUM 100 MG: 100 CAPSULE, LIQUID FILLED ORAL at 09:01

## 2017-01-16 RX ADMIN — GUAIFENESIN 200 MG: 100 SOLUTION ORAL at 09:01

## 2017-01-16 RX ADMIN — SENNOSIDES 8.6 MG: 8.6 TABLET ORAL at 09:01

## 2017-01-16 RX ADMIN — SODIUM CHLORIDE TAB 1 GM 1 G: 1 TAB at 09:01

## 2017-01-16 RX ADMIN — ENOXAPARIN SODIUM 40 MG: 100 INJECTION SUBCUTANEOUS at 11:01

## 2017-01-16 RX ADMIN — SODIUM CHLORIDE, PRESERVATIVE FREE 3 ML: 5 INJECTION INTRAVENOUS at 03:01

## 2017-01-16 RX ADMIN — SODIUM CHLORIDE 50 MG: 9 INJECTION, SOLUTION INTRAVENOUS at 04:01

## 2017-01-16 RX ADMIN — SODIUM CHLORIDE, PRESERVATIVE FREE 3 ML: 5 INJECTION INTRAVENOUS at 09:01

## 2017-01-16 RX ADMIN — PRAVASTATIN SODIUM 20 MG: 20 TABLET ORAL at 09:01

## 2017-01-16 RX ADMIN — LEVOTHYROXINE SODIUM 75 MCG: 75 TABLET ORAL at 09:01

## 2017-01-16 RX ADMIN — SODIUM CITRATE AND CITRIC ACID MONOHYDRATE 30 ML: 500; 334 SOLUTION ORAL at 06:01

## 2017-01-16 RX ADMIN — ACYCLOVIR SODIUM: 50 INJECTION, SOLUTION INTRAVENOUS at 09:01

## 2017-01-16 RX ADMIN — SODIUM CHLORIDE TAB 1 GM 1 G: 1 TAB at 03:01

## 2017-01-16 RX ADMIN — POTASSIUM CHLORIDE 10 MEQ: 10 INJECTION, SOLUTION INTRAVENOUS at 01:01

## 2017-01-16 RX ADMIN — MOXIFLOXACIN HYDROCHLORIDE 400 MG: 400 INJECTION, SOLUTION INTRAVENOUS at 11:01

## 2017-01-16 NOTE — PROGRESS NOTES
[facility] Progress Note    Reason for Consult - New onset of seizure      S: Today patient seen and examined. More awake today, following some commands. No acute events overnight reported.    Vitals -     Physical Exam -     Vitals:    01/16/17 0743 01/16/17 0832 01/16/17 1155 01/16/17 1210   BP: (!) 100/55  (!) 72/40    BP Location:       Patient Position: Lying  Lying    BP Method: Automatic  Manual    Pulse: 80 85 82 87   Resp: 15 18 17 18   Temp: 98 °F (36.7 °C)  98.4 °F (36.9 °C)    TempSrc: Axillary  Axillary    SpO2:  (!) 93%  (!) 92%   Weight:       Height:           General appearance: Well nourished, well developed, no acute distress.  Facial Expression: normal       Affect: full       Orientation to time & place:  Patient deaf, difficult to assess mentation. Oriented to person. She recognizes family member today.   Speech:  normal (not dysarthric)  Cranial nerves: Pupils equal round and reactive, extraocular movements intact, facial sensation intact, face symmetrical, hearing impaired bilaterally, palate raises midline, tongue protrudes midline.  Musculoskeletal:  Muscle tone: all 4 extremities normal        Muscle Bulk: all 4 extremities normal        Muscle strength: Moves all 4 extremities spontaneously. Unable to test strength 2/2 poor participation.    Deep tendon Reflexes: 1+ bilateral biceps, triceps, patella, ankles    Cerebellar and Coordination:  Gait:  deferred    Finger-nose: no tested      MOVEMENT DISORDERS FOCUSED EXAM  Tremor present?   No       Labs -    Imaging -   CT head: #1. Suboptimal exam as above.  Given the limitations, no definitive acute intracranial abnormalities are identified.    CXR 01/10/17: Mild interstitial edema versus pneumonia    Assessment and Plan -   89 yo F w/ PMHx hypothyroidism, HLD, DM, HTN and breast cancer admitted with AMS and 2 witnessed seizure activity in setting of possible Pneumonia, UTI. There was nothing focal noted on exam suggestive of acute  infarction, no nuchal rigidity though the exam was difficult due to poor participation.   CT head with no acute intracranial findings.    New onset of seizures/ Likely provoked seizure 2/2 systemic infection/ metabolic deragement.  -Tonic clonic seizures x2 witnessed, no loss bowel/bladder, no bitten tongue, first self resolved, 2nd seizure resolved after Lorazepam 1mg IV.   -Hyponatremia (Improving, today 130)  -Leukocytosis on admission WBC 15.2, still elevated, afebrile. No nuchal rigidity.  -Prolactin levels elevated. Now back to normal  -CSF Colorless, WBC 6, , Diff: 42% Neut, 53% Lymph, Glu 69, Prot 53   -CSF Culture NGTD. HSV PCR negative. Can d/c Acyclovir.    Recommendations:  -EEG encephalopathic, but no epileptiform activity noted.  -Continue treating infection. Correct electrolytes derangement.  -Switched Keppra to Vimpat on 01/13/17 due to pt agitated.  -Vimpat decreased to 50 mg Q12h due to possible sedative side effect.  -Will follow up from [facility].    Discussed case with Dr Clemnets.    Bianca Davis MD  Neurology PGY-3    [facility] Attending  The patient is a 89 year old female who had hyponatremia, severe infection (unknown source- SIRS), on VIMPAT switched from Keppra (due to agitation) who could not awaken properly.  The Primary team did a work up that included analysis of CSF via LP and neither bacterial meningitis nor herpes encephalitis was verified.  Now that her Vimpat dose has decreased, down to Vimpat 50mg q12 hrs, she is more alert.  Her sodium has consistently improved as well which also is having a beneficial effect on mentation as well.  She will be discharged as soon as possible.  We will sign off given these fortunate improvements.    Thank you for your referral.    Signed,  Soumya Clements MD

## 2017-01-16 NOTE — CONSULTS
Consulted for Stage II to sacrum. Pt lethargic. Opens eyes to touch but cannot stay awake. Stage II to Right Buttock, Skin tear to Right Hand and Right 2nd Finger. Hemorrhagic blister to Left 3rd finger. Bilateral extremities swollen overall. Heels intact. Patient turned to Right side using pillow prop following wound care. Son at bedside.     Right Hand Skin Tear: 2 x 0.5 (cm) pink and moist with missing skin flap. Moderate amount of moist serosanguinous drainage on previous dressing.    Right 2nd Finger Skin Tear: 1.5 x 1 (cm) red and moist with missing skin flap. Small amount of dried serosangunious dressing on previous dressing.   Cleaned with saline and applied Bandaid to finger and mepilex/saline gel to hand. Change q 3 days of C/D/I.     Left 3rd Finger Hemorrhagic Blister: 1.5 x 1 (cm) purple/dry. Cleaned with saline and applied Bandaid. Change q 3 days if C/D/I.     Right buttock Stage II Pressure Injury 1 x 0.3 (cm). Pink, moist, and clean. Blanchable redness taco-wound. Applied saline gel/mepilex. Can remain in place up to 3 days if C/D/I. Change prn if soiled or damaged.

## 2017-01-16 NOTE — PLAN OF CARE
Problem: SLP Goal  Goal: SLP Goal  Short Term Goals: (updated)  1. Pt will tolerate a pureed tray and NECTAR THICK liquids with no overt s/s of aspiration.   2. Patient, Family, and/or caregivers will demonstrate/verbalize 3 safe swallowing precautions.      Outcome: Outcome(s) achieved Date Met:  01/16/17  Pt seen by SLP this am for direct dysphagia tx with son present. REC: pureed tray and continue with nectar thick liquids. Pt with fluctuating mental status and tends to hold chopped meats in oral cavity. Pt tolerated pureed this session with son present. SonJustin requested boost with meals. SLP will place order in epic.   GIOVANNY Gould, CCC-SLP  Speech Pathologist 1/16/2017

## 2017-01-16 NOTE — SIGNIFICANT EVENT
Per primary team patient very drowsy today, possible 2/2 to AED vs worsening hyponatremia. We recommend decrease Vimpat to 50mg IV every 12 hours.  No seizure activity reported since admission. EEG also without epileptiform activity.     Bianca Davis  [facility].

## 2017-01-16 NOTE — PROGRESS NOTES
Progress Note  Nephrology      Consult Requested By: Kory Dave MD      SUBJECTIVE:     Overnight events  Patient is a 89 y.o. female     Patient Active Problem List   Diagnosis    Dementia without behavioral disturbance    History of breast cancer    Pre-diabetes    Hypothyroidism    Dyslipidemia    Seizure    Seizures    Hyponatremia    Generalized convulsive epilepsy with intractable epilepsy     Past Medical History   Diagnosis Date    Cancer     Diabetes mellitus type II     Hypertension     Thyroid disease               OBJECTIVE:     Vitals:    01/16/17 0743 01/16/17 0832 01/16/17 1155 01/16/17 1210   BP: (!) 100/55  (!) 72/40    BP Location:       Patient Position: Lying  Lying    BP Method: Automatic  Manual    Pulse: 80 85 82 87   Resp: 15 18 17 18   Temp: 98 °F (36.7 °C)  98.4 °F (36.9 °C)    TempSrc: Axillary  Axillary    SpO2:  (!) 93%  (!) 92%   Weight:       Height:           Temp: 98.4 °F (36.9 °C) (01/16/17 1155)  Pulse: 87 (01/16/17 1210)  Resp: 18 (01/16/17 1210)  BP: (!) 72/40 (01/16/17 1155)  SpO2: (!) 92 % (01/16/17 1210)               Medications:   custom IVPB builder   Intravenous Q8H    aspirin  81 mg Oral Daily    docusate sodium  100 mg Oral BID    enoxaparin  40 mg Subcutaneous Daily    guaifenesin  600 mg Oral BID    lacosamide (VIMPAT) IVPB  50 mg Intravenous Q12H    levothyroxine  75 mcg Oral Daily    moxifloxacin  400 mg Intravenous Q24H    polyethylene glycol  17 g Oral Daily    pravastatin  20 mg Oral Daily    senna  8.6 mg Oral Daily    sodium chloride 0.9%  3 mL Intravenous Q8H    sodium chloride  1 g Oral TID                  Physical Exam:  General appearance: NAD  No SOB  Lungs: diminished breath sounds  Heart: Pulse 70.  Abdomen: soft  Extremities: no  edema  Skin: dry      Laboratory:  ABG  Labs reviewed  Recent Results (from the past 336 hour(s))   Basic metabolic panel    Collection Time: 01/16/17 11:51 AM   Result Value Ref Range    Sodium  131 (L) 136 - 145 mmol/L    Potassium 4.2 3.5 - 5.1 mmol/L    Chloride 100 95 - 110 mmol/L    CO2 14 (L) 23 - 29 mmol/L    BUN, Bld 15 10 - 30 mg/dL    Creatinine 0.8 0.5 - 1.4 mg/dL    Calcium 8.3 (L) 8.7 - 10.5 mg/dL    Anion Gap 17 (H) 8 - 16 mmol/L   Basic metabolic panel    Collection Time: 01/16/17  5:09 AM   Result Value Ref Range    Sodium 130 (L) 136 - 145 mmol/L    Potassium 4.2 3.5 - 5.1 mmol/L    Chloride 100 95 - 110 mmol/L    CO2 17 (L) 23 - 29 mmol/L    BUN, Bld 12 10 - 30 mg/dL    Creatinine 0.8 0.5 - 1.4 mg/dL    Calcium 8.2 (L) 8.7 - 10.5 mg/dL    Anion Gap 13 8 - 16 mmol/L   Basic metabolic panel    Collection Time: 01/15/17 11:55 PM   Result Value Ref Range    Sodium 130 (L) 136 - 145 mmol/L    Potassium 3.9 3.5 - 5.1 mmol/L    Chloride 99 95 - 110 mmol/L    CO2 16 (L) 23 - 29 mmol/L    BUN, Bld 13 10 - 30 mg/dL    Creatinine 0.8 0.5 - 1.4 mg/dL    Calcium 8.1 (L) 8.7 - 10.5 mg/dL    Anion Gap 15 8 - 16 mmol/L     Recent Results (from the past 336 hour(s))   CBC auto differential    Collection Time: 01/16/17  5:09 AM   Result Value Ref Range    WBC 14.82 (H) 3.90 - 12.70 K/uL    Hemoglobin 10.2 (L) 12.0 - 16.0 g/dL    Hematocrit 31.0 (L) 37.0 - 48.5 %    Platelets 358 (H) 150 - 350 K/uL   CBC auto differential    Collection Time: 01/15/17  6:19 AM   Result Value Ref Range    WBC 10.65 3.90 - 12.70 K/uL    Hemoglobin 9.0 (L) 12.0 - 16.0 g/dL    Hematocrit 25.8 (L) 37.0 - 48.5 %    Platelets 301 150 - 350 K/uL   CBC auto differential    Collection Time: 01/14/17  5:12 AM   Result Value Ref Range    WBC 13.78 (H) 3.90 - 12.70 K/uL    Hemoglobin 9.5 (L) 12.0 - 16.0 g/dL    Hematocrit 28.1 (L) 37.0 - 48.5 %    Platelets 312 150 - 350 K/uL     Urinalysis  No results for input(s): COLORU, CLARITYU, SPECGRAV, PHUR, PROTEINUA, GLUCOSEU, BILIRUBINCON, BLOODU, WBCU, RBCU, BACTERIA, MUCUS, NITRITE, LEUKOCYTESUR, UROBILINOGEN, HYALINECASTS in the last 24 hours.    Diagnostic Results:  X-Ray: Reviewed  US:  Reviewed  Echo: Reviewed  ACCESS    ASSESSMENT/PLAN:   CKD 3.  Calculated GFR 31 cc/min.  Hyponatremia.  Metabolic acidosis.  Anemia.  Hypotension.  Weight 47 kg.  CXR -  Calcification and tortuosity of thoracic aorta.  There is increase in the interstitial markings bilaterally.   increased markings at the left base unchanged.    I and O.

## 2017-01-16 NOTE — PT/OT/SLP PROGRESS
Physical Therapy  Treatment    Sue Capone   MRN: [id number]   Admitting Diagnosis: Hyponatremia    PT Received On: 17  PT Start Time: 806     PT Stop Time: 830    PT Total Time (min): 24 min       Billable Minutes:  Therapeutic Activity 24    Treatment Type: Treatment  PT/PTA: PTA     PTA Visit Number: 2       General Precautions: Standard, fall, hearing impaired  Orthopedic Precautions: N/A   Braces:      Do you have any cultural, spiritual, Buddhism conflicts, given your current situation?: No    Subjective:  Communicated with nsg prior to session.      Pain Ratin/10                   Objective:   Patient found with: restraints, telemetry    Functional Mobility:  Bed Mobility:        Transfers:       Gait:        Stairs:      Balance:   Static Sit:   Dynamic Sit:   Static Stand:   Dynamic stand:      Therapeutic Activities and Exercises: ue/le PROM in available planes X 20 reps, gastroc and stretches in abduction,unable to arouse pt during rx        AM-PAC 6 CLICK MOBILITY  How much help from another person does this patient currently need?   1 = Unable, Total/Dependent Assistance  2 = A lot, Maximum/Moderate Assistance  3 = A little, Minimum/Contact Guard/Supervision  4 = None, Modified [place]/Independent    Turning over in bed (including adjusting bedclothes, sheets and blankets)?: 2  Sitting down on and standing up from a chair with arms (e.g., wheelchair, bedside commode, etc.): 2  Moving from lying on back to sitting on the side of the bed?: 2  Moving to and from a bed to a chair (including a wheelchair)?: 2  Need to walk in hospital room?: 1  Climbing 3-5 steps with a railing?: 1  Total Score: 10    AM-PAC Raw Score CMS G-Code Modifier Level of Impairment Assistance   6 % Total / Unable   7 - 9 CM 80 - 100% Maximal Assist   10 - 14 CL 60 - 80% Moderate Assist   15 - 19 CK 40 - 60% Moderate Assist   20 - 22 CJ 20 - 40% Minimal Assist   23 CI 1-20% SBA / CGA   24 CH 0% Independent/  Mod I     Patient left supine with all lines intact, call button in reach and son present.    Assessment: pt sleeping throughout rx  Sue Capone is a 89 y.o. female with a medical diagnosis of Hyponatremia and presents with .    Rehab identified problem list/impairments: Rehab identified problem list/impairments: weakness, impaired endurance, impaired functional mobilty, impaired cognition, decreased upper extremity function, decreased lower extremity function, impaired skin    Rehab potential is fair.    Activity tolerance: Poor    Discharge recommendations: Discharge Facility/Level Of Care Needs: home, home health PT (pt's son is requesting  services now)     Barriers to discharge: Barriers to Discharge: None    Equipment recommendations: Equipment Needed After Discharge: hospital bed     GOALS: see general POC  Physical Therapy Goals        Problem: Physical Therapy Goal    Goal Priority Disciplines Outcome Goal Variances Interventions   Physical Therapy Goal     PT/OT, PT Ongoing (interventions implemented as appropriate)     Description:  Goals to be met by: 17     Patient will increase functional independence with mobility by performin. Supine to sit with Moderate Assistance  2. Sit to supine with Moderate Assistance  3. Bed to chair transfer with Moderate Assistance   4. Sitting at edge of bed x15 minutes with Minimal Assistance  5. Lower extremity exercise program x 10-15 reps per handout, with assistance as needed    Recommendations: Recommend d/c home with 24/7 supervision/assistance for safety. Recommended DME includes hospital bed. Pt's family declined  PT/OT.                 PLAN:    Patient to be seen 3 x/week  to address the above listed problems via therapeutic activities, therapeutic exercises, neuromuscular re-education  Plan of Care expires: 17  Plan of Care reviewed with: patient, son         Dipesh Phillips, PTA  2017

## 2017-01-16 NOTE — PHYSICIAN QUERY
PT Name: Sue Capone  MR #: [id number]  Physician Query Form - Neurological Condition Clarification     Reviewer Yoanna Drake RN CDIS  Ext [email or web address]  This form is a permanent document in the medical record.     Query Date: January 16, 2017    By submitting this query, we are merely seeking further clarification of documentation. Please utilize your independent clinical judgment when addressing the question(s) below.     Indicators   Supporting Clinical Findings Location in Medical Record   x LOC, AMS, Confusion documented Generalized Tonic Clonic Seizures   - DDx includes meningitis/encephalitis vs mass lesions vs hyponatremia   AMS  PN 01/16    Electrolyte Imbalance(s)      Acute/Chronic Illness      Radiology findings:     x Medication(s): Currelty on  Acyclovir IV for meningitis/ encephalitis  Continue Vimpat   PN 01/16   x Treatment(s): Continue treating infection. Correct electrolytes derangement. Neurology PN 01/14   x Other:  New onset of seizures/ Likely provoked seizure 2/2 systemic infection/ metabolic deragement.  EEG encephalopathic, but no epileptiform activity noted Neurology PN 01/14     Provider, please specify the diagnosis or diagnoses associated with above clinical findings.    [ ] Toxic Encephalopathy    [ ] Metabolic Encephalopathy    [ ] Hepatic Encephalopathy    [ ] Unspecified Encephalopathy    [ ] Other Neurological diagnosis _____________________________________    [ X ] Clinically undetermined      Please document in your progress notes daily for the duration of treatment, until resolved, and include in your discharge summary.

## 2017-01-16 NOTE — PLAN OF CARE
Problem: Patient Care Overview  Goal: Plan of Care Review  Outcome: Ongoing (interventions implemented as appropriate)  Pt very drowsy and only awakens to repeated verbal stimuli but only awakens for a few seconds. Initial assessment documented per flowsheet. Family to remain at bedside throughout night. Spoke with MD about not administering PO meds due to high risk of aspiration, MD ordered potassium IV. Cardiac monitoring continued. Safety maintained - will cont to monitor.

## 2017-01-16 NOTE — NURSING
Patient has Rell of 10. Stage I POA developed to Stage II at R buttock (see my previous note). At risk for further breakdown. Pt not self turning or able to indicate discomfort at this time. Ordered Low Air Loss overlay. Confirmation # [id number]. Continue turning q 2 hours using wedge or pillow prop.

## 2017-01-16 NOTE — PT/OT/SLP PROGRESS
"Speech Language Pathology  Treatment    Sue Capone   MRN: [id number]   Admitting Diagnosis: Hyponatremia    Diet recommendations: Solid Diet Level: Dental Soft  Liquid Diet Level: Nectar Thick   Upright for meals, must be alert and awake to eat, small cup swallows OR single straw sips as able, thickened all liquids to NECTAR consistency, crush po meds, pt may have pureed tray and nectar thick liquids to consume. Pt does require assist for feeding.     SLP Treatment Date: 01/16/17  Speech Start Time: 1145     Speech Stop Time: 1205     Speech Total (min): 20 min       TREATMENT BILLABLE MINUTES:  Treatment Swallowing Dysfunction 20    Has the patient been evaluated by SLP for swallowing? : Yes  Keep patient NPO?: No   General Precautions: Standard, aspiration, fall, hearing impaired  Current Respiratory Status:  (room air)       Subjective:  Pt found in room, pt remained non verbal. Son in room, reported "sometimes she doesn't speak."    Pain Rating:  (no indicators of pain noted)              Pain Rating Post-Intervention: 0/10    Objective:   Patient found with: bed alarm, telemetry  Pt seen at bedside for direct dysphagia tx and to assess with soft solid diet. SonJustin reported pt has not been eating too good and she is not always awake.   Pt found in room with eyes open, he reports she [place]. Pt did maintain eyes open during session. Pt was trialed with tsp bites of vanilla pudding, chopped meats, nectar thick juice by tsp, cup and straw sips. Pt did accept chopped meats but demonstrated perseverative chewing and made no attempt to swallow po. Pt did close eyes while chewing. SLP had to take a gloved finger and remove chopped meats. Pt did accept 2 oz of vanilla pudding with timely oral phase and loud audible swallow was noted. Pt able to accept self regulated swallows of nectar thick liquids and single straw sips of nectar thick juice when managed by SLP. Pt did not cough or choking with pudding or thickened " liquids. Pt to be downgraded to pureed diet at this time. Sonrosalva did request boost with meals as added supplement. SLP did notify RN and make diet changes in epic. Swallow precautions posted in room to reflect updated diet precautions.   Son educated on swallow precautions and proper feeding instructions.     FIM:  Social Interaction: 2 (no verbalizations noted) Maximal Direction--The patient interacts appropriately 25 to 49% of the time, but may beed restraint due to socially inappropriate behaviors. (no verbalizations noted)         Comprehension: 1 Total Assistance--The patient understands direction and conversation about basic daily needs less than 25% of the time, or does no understand simple, commonly used spoken expressions (e.g hello, how are you) or gestures (e.g. waving good-bye, thank   Expression: 1 Total Assistance--The patient expresses basic daily needs and ideas less than 25% of the time, or does not express basic needs appropriately or consistently despite prompting.          Assessment:  Sue Capone is a 89 y.o. female with a medical diagnosis of Hyponatremia and presents with dysphagia to thin liquids and soft solids. Pt safe for diet downgrade to pureed and continued nectar thick liquids. Son educated and verbalized understanding of all information presented to him.     Discharge recommendations: Discharge Facility/Level Of Care Needs: home     Goals:   SLP Goals     Not on file      Multidisciplinary Problems (Resolved)        Problem: SLP Goal    Goal Priority Disciplines Outcome   SLP Goal   (Resolved)     SLP Outcome(s) achieved   Description:  Short Term Goals: (updated)  1. Pt will tolerate a pureed tray and NECTAR THICK liquids with no overt s/s of aspiration.   2. Patient, Family, and/or caregivers will demonstrate/verbalize 3 safe swallowing precautions.                      Plan:   Patient to be seen    Plan of Care expires: 02/10/17  Plan of Care reviewed with: patient,  caregiver, son  SLP Follow-up?: No             GIOVANNY Gould, CCC-SLP  01/16/2017

## 2017-01-16 NOTE — PROGRESS NOTES
[facility] Internal Medicine Resident [facility] Progress Note    Subjective:      Patient unable to answer questions due to deafness and mental status     Objective:   Last 24 Hour Vital Signs:  BP  Min: 105/50  Max: 132/58  Temp  Av.1 °F (36.7 °C)  Min: 97.2 °F (36.2 °C)  Max: 99.1 °F (37.3 °C)  Pulse  Av.3  Min: 70  Max: 85  Resp  Av  Min: 18  Max: 18  SpO2  Av %  Min: 93 %  Max: 97 %  I/O last 3 completed shifts:  In: 1400 [I.V.:300; IV Piggyback:1100]  Out: 1633 [Urine:1633]    Physical Examination:    General:   Sleeping in bed, in no apparent distress  Head:    Normocephalic and atraumatic  Eyes:    PERRL; EOMi with anicteric sclera and clear conjunctivae  Mouth:   Oropharynx clear and without exudate; moist mucous membranes  Cardio:   Regular rate and rhythm with normal S1 and S2; no murmurs or rubs  Resp:    CTAB; respirations unlabored; no wheezes, crackles or rhonchi  Abdom:   Soft, NTND with normoactive bowel sounds  Extrem:   Warm and well-perfused with no clubbing, cyanosis or edema  Skin:     No rashes, lesions, or color changes  Pulses:   2+ and symmetric distally  Neuro:   Responds to light touch, moves all extremities spontaneously.    Laboratory:  Laboratory Data  Pertinent Findings:  Recent Labs      17   0512  01/15/17   0619  17   0509   WBC  13.78*  10.65  14.82*   HGB  9.5*  9.0*  10.2*   HCT  28.1*  25.8*  31.0*   PLT  312  301  358*   MCV  82  81*  83   RDW  14.3  14.2  14.5   GRAN  74.9*  10.3*  66.4  7.1  79.1*  11.7*   LYMPH  12.6*  1.7  18.5  2.0  10.7*  1.6   MONO  10.5  1.5*  11.4  1.2*  8.8  1.3*   EOS  0.1  0.3  0.1   BASO  0.15  0.08  0.07   EOSINOPHIL  0.9  2.6  0.6   BASOPHIL  1.1  0.8  0.5       Recent Labs      01/15/17   0619  01/15/17   1127  01/15/17   1733  01/15/17   2355  17   0509   NA  125*  127*  128*  130*  130*   K  3.2*  3.3*  3.2*  3.9  4.2   CL  99  100  99  99  100   CO2  17*  16*  17*  16*  17*   BUN  10  11  11  13  12    CREATININE  0.8  0.7  0.7  0.8  0.8     No results for input(s): POCTGLUCOSE in the last 72 hours.  No results for input(s): PROT, ALBUMIN, BILITOT, AST, ALT, ALKPHOS in the last 72 hours.    Microbiology Data  Pertinent Findings:  No new data    Other Results:  EKG (my interpretation): No new EKG    Radiology Data   Pertinent Findings (my interpretation):  - 1/15/17 CT Chest: L main bronchus, LLL & ELIAS mucus plug vs endobronchial lesion. Post obstructive atelectasis vs consolidation.    Current Medications:     Infusions:        Scheduled:   custom IVPB builder   Intravenous Q8H    aspirin  81 mg Oral Daily    docusate sodium  100 mg Oral BID    enoxaparin  40 mg Subcutaneous Daily    lacosamide (VIMPAT) IVPB  50 mg Intravenous Q12H    levothyroxine  75 mcg Oral Daily    polyethylene glycol  17 g Oral Daily    pravastatin  20 mg Oral Daily    senna  8.6 mg Oral Daily    sodium chloride 0.9%  3 mL Intravenous Q8H    sodium chloride  1 g Oral TID        PRN:      Antibiotics and Day Number of Therapy:  Acyclovir 500mg IV q8hr Day 5    Lines and Day Number of Therapy:  PIV x2 20Gx2    Assessment:     Sue Capone is a 89 y.o.female with  Patient Active Problem List    Diagnosis Date Noted    Seizure 01/11/2017    Seizures 01/11/2017    Dementia without behavioral disturbance 09/01/2016    History of breast cancer 09/01/2016    Pre-diabetes 09/01/2016    Hypothyroidism 09/01/2016    Dyslipidemia 09/01/2016        Plan:     89 yo F w/ medical hx of Pneumonia and UTI discharged 1 day ago from ED on Levofloxacin, hypothyroidism, HLD, Breast CA w/ resection and radiation in 1995 with complaint of seizure x1 day presents with      Generalized Tonic Clonic Seizures  - DDx includes meningitis/encephalitis vs hyponatremia  - Tonic clonic seizures x2 witnessed, no loss bowel/bladder, first self resolved, seizure in ED resolved after Lorazepam 1mg IV  - AMS, fever and chills x2 days. Sent home from ED on  1/10/17 with Levofloxacin 750mg PO for pneumonia (CURB 65 =1)  - Initial vitals, afebrile 98.2F, HR 90, /74, SaO2 95% RA  - At admission, WBC 15.2, afebrile. No nuchal rigidity.   - 1/12/17 Blood cx x2: pending  - 1/11/17 LP results: Colorless, WBC 6, , Diff: 42% Neut, 53% Lymph, Glu 69, Prot 53  - 1/11/17 MRI Brain: Motion limited examination, no intracranial mass lesion or acute pathology, mild chronic ischemic changes  - 1/12/17 EEG: static encephalopathy without focal changes  nor an epileptic process   - 1/11/17 HSV CSF PCR: Negative  - Consult Neurology (Christian): MRI neg, consider MRI w/ epilespy protocol. EEG encephalopathic, but no epileptiform activity noted. Continue Vimpat  - Patient currelty on  Acyclovir IV for meningitis/encephalitis, will continue due to elevated RBC in CSF.  - Decreased dose of Vimpat to 50mg IV q12 due to potentially sedating effects      Hypovolemic Hyponatremia  - 1/13/17 Na 122, Urine Osm 500, Urine Na 72.  - Urine labs indicate possible SIADH vs RTA4. Ruled out hypothyroid (TSH/T4 wnl), [place]'s (AM cortisol wnl)  - 1/15/17 CT Chest: L main bronchus, LLL & ELIAS mucus plug vs endobronchial lesion. Post obstructive atelectasis vs consolidation.  - As per nephrology recommendations, continue free water restrict, PO salt tabs, Lasix IV. Monitor q6hr BMP. No indication for 3% saline at this time. If no clinical improvement, consider Vaptan  - Currently, Na 130  - Continue to monitor BMP, titrate salt tabs, fluid restrict, continue lasix as per nephrology, consider administering Vaptan in ICU.    Community Acquired Pneumonia  - 1/9/17 CXR: RLL consolidation  - At admission on exam, Lungs CTAB  - Negative Procalcitonin <0.09  - Discontinued Vancomycin / Moxifloxacin (de-escalated from Vanc/Aztreonam/Bactrim IV)      Non-Anion Gap Metabolic Acidosis  - 1/14/17 HCO3- 15, HCO3- decreasing since admission  - Possibly RTA, consider urine studies  - Continue to monitor  BMP      Normocytic Anemia  - At admission, H/H 11.2/33.5 MCV 83  - 1/11/17 Iron 44, Ferritin 115. B12 and Folate wnl  - Consider treatment with PO iron supplementation after acute illness.      Constipation  - As per family, no BM in 5 days  - In ED, patient disimpacted  - Continue miralax PRN, colace. Consider lactulose supp. May add/titrate meds as needed  - BM reported 1/12      Hypothyroidism  - 1/11/17 TSH: 2.219, FT4 1.18  - Continue home synthroid      HLD  - 1/3/17 Lipid Panel: TChol 153, LDL 71  - Continue home statin      DVT PPx: Lovenox  Diet: Pureed  Code: DNR  Social: Lives at home. PT/OT: family refuses HH PT/OT. DME hospital bed      Disposition: Pending clinical improvement and workup of hyponatremia    Ming Bai  [facility] Internal Medicine HO-I  [facility] Internal Medicine Service Team B    [facility] Medicine Hospitalist Pager numbers:   [facility] Hospitalist Medicine Team A (Benita/Elpidio): [phone]  [facility] Hospitalist Medicine Team B (Jolie/Jorge):  [phone]

## 2017-01-16 NOTE — PROGRESS NOTES
Pharmacy New Medication Education     Patient accepted medication education.        Pharmacy educated patient on the following medications, using the teach-back method.  Vimpat    Learners of pharmacy medication education included:  Son   Patient +/- learner response:  verbalize understanding

## 2017-01-17 LAB
ANION GAP SERPL CALC-SCNC: 8 MMOL/L
ANION GAP SERPL CALC-SCNC: 9 MMOL/L
ANION GAP SERPL CALC-SCNC: 9 MMOL/L
ANISOCYTOSIS BLD QL SMEAR: SLIGHT
BACTERIA BLD CULT: NORMAL
BACTERIA BLD CULT: NORMAL
BASOPHILS # BLD AUTO: ABNORMAL K/UL
BASOPHILS NFR BLD: 0 %
BUN SERPL-MCNC: 13 MG/DL
BUN SERPL-MCNC: 14 MG/DL
BUN SERPL-MCNC: 16 MG/DL
CALCIUM SERPL-MCNC: 7.8 MG/DL
CALCIUM SERPL-MCNC: 7.9 MG/DL
CALCIUM SERPL-MCNC: 8.1 MG/DL
CHLORIDE SERPL-SCNC: 102 MMOL/L
CHLORIDE SERPL-SCNC: 104 MMOL/L
CHLORIDE SERPL-SCNC: 104 MMOL/L
CMV SPEC QL SHELL VIAL CULT: NO GROWTH
CO2 SERPL-SCNC: 17 MMOL/L
CO2 SERPL-SCNC: 18 MMOL/L
CO2 SERPL-SCNC: 19 MMOL/L
CREAT SERPL-MCNC: 0.8 MG/DL
CREAT SERPL-MCNC: 0.8 MG/DL
CREAT SERPL-MCNC: 0.9 MG/DL
DIFFERENTIAL METHOD: ABNORMAL
EOSINOPHIL # BLD AUTO: ABNORMAL K/UL
EOSINOPHIL NFR BLD: 1 %
ERYTHROCYTE [DISTWIDTH] IN BLOOD BY AUTOMATED COUNT: 14.9 %
EST. GFR  (AFRICAN AMERICAN): >60 ML/MIN/1.73 M^2
EST. GFR  (NON AFRICAN AMERICAN): 55 ML/MIN/1.73 M^2
EST. GFR  (NON AFRICAN AMERICAN): >60 ML/MIN/1.73 M^2
EST. GFR  (NON AFRICAN AMERICAN): >60 ML/MIN/1.73 M^2
GLUCOSE SERPL-MCNC: 159 MG/DL
GLUCOSE SERPL-MCNC: 205 MG/DL
GLUCOSE SERPL-MCNC: 211 MG/DL
GRAM STN SPEC: NORMAL
GRAM STN SPEC: NORMAL
HCT VFR BLD AUTO: 28.6 %
HGB BLD-MCNC: 9.5 G/DL
HYPOCHROMIA BLD QL SMEAR: ABNORMAL
LYMPHOCYTES # BLD AUTO: ABNORMAL K/UL
LYMPHOCYTES NFR BLD: 4 %
MCH RBC QN AUTO: 27.5 PG
MCHC RBC AUTO-ENTMCNC: 33.2 %
MCV RBC AUTO: 83 FL
MONOCYTES # BLD AUTO: ABNORMAL K/UL
MONOCYTES NFR BLD: 4 %
NEUTROPHILS NFR BLD: 86 %
NEUTS BAND NFR BLD MANUAL: 5 %
OVALOCYTES BLD QL SMEAR: ABNORMAL
PLATELET # BLD AUTO: 344 K/UL
PLATELET BLD QL SMEAR: ABNORMAL
PMV BLD AUTO: 10 FL
POIKILOCYTOSIS BLD QL SMEAR: SLIGHT
POLYCHROMASIA BLD QL SMEAR: ABNORMAL
POTASSIUM SERPL-SCNC: 3.8 MMOL/L
POTASSIUM SERPL-SCNC: 3.9 MMOL/L
POTASSIUM SERPL-SCNC: 3.9 MMOL/L
RBC # BLD AUTO: 3.45 M/UL
SODIUM SERPL-SCNC: 130 MMOL/L
WBC # BLD AUTO: 21.66 K/UL

## 2017-01-17 PROCEDURE — 25000003 PHARM REV CODE 250: Performed by: STUDENT IN AN ORGANIZED HEALTH CARE EDUCATION/TRAINING PROGRAM

## 2017-01-17 PROCEDURE — 85007 BL SMEAR W/DIFF WBC COUNT: CPT

## 2017-01-17 PROCEDURE — C9254 INJECTION, LACOSAMIDE: HCPCS | Performed by: INTERNAL MEDICINE

## 2017-01-17 PROCEDURE — 25000003 PHARM REV CODE 250: Performed by: INTERNAL MEDICINE

## 2017-01-17 PROCEDURE — 63600175 PHARM REV CODE 636 W HCPCS: Performed by: INTERNAL MEDICINE

## 2017-01-17 PROCEDURE — 11000001 HC ACUTE MED/SURG PRIVATE ROOM

## 2017-01-17 PROCEDURE — 80048 BASIC METABOLIC PNL TOTAL CA: CPT | Mod: 91

## 2017-01-17 PROCEDURE — 85027 COMPLETE CBC AUTOMATED: CPT

## 2017-01-17 PROCEDURE — 80048 BASIC METABOLIC PNL TOTAL CA: CPT

## 2017-01-17 PROCEDURE — 36415 COLL VENOUS BLD VENIPUNCTURE: CPT

## 2017-01-17 PROCEDURE — 94668 MNPJ CHEST WALL SBSQ: CPT

## 2017-01-17 PROCEDURE — 94761 N-INVAS EAR/PLS OXIMETRY MLT: CPT

## 2017-01-17 PROCEDURE — 87040 BLOOD CULTURE FOR BACTERIA: CPT

## 2017-01-17 RX ORDER — METRONIDAZOLE 500 MG/100ML
500 INJECTION, SOLUTION INTRAVENOUS
Status: DISCONTINUED | OUTPATIENT
Start: 2017-01-17 | End: 2017-01-23

## 2017-01-17 RX ORDER — ASPIRIN 81 MG/1
81 TABLET ORAL DAILY
Status: DISCONTINUED | OUTPATIENT
Start: 2017-01-17 | End: 2017-01-17

## 2017-01-17 RX ORDER — SODIUM CITRATE AND CITRIC ACID MONOHYDRATE 334; 500 MG/5ML; MG/5ML
30 SOLUTION ORAL 2 TIMES DAILY
Status: DISCONTINUED | OUTPATIENT
Start: 2017-01-17 | End: 2017-01-18

## 2017-01-17 RX ADMIN — PRAVASTATIN SODIUM 20 MG: 20 TABLET ORAL at 10:01

## 2017-01-17 RX ADMIN — GUAIFENESIN 200 MG: 100 SOLUTION ORAL at 09:01

## 2017-01-17 RX ADMIN — ACYCLOVIR SODIUM: 50 INJECTION, SOLUTION INTRAVENOUS at 04:01

## 2017-01-17 RX ADMIN — DOCUSATE SODIUM 100 MG: 100 CAPSULE, LIQUID FILLED ORAL at 09:01

## 2017-01-17 RX ADMIN — LEVOTHYROXINE SODIUM 75 MCG: 75 TABLET ORAL at 09:01

## 2017-01-17 RX ADMIN — ENOXAPARIN SODIUM 40 MG: 100 INJECTION SUBCUTANEOUS at 12:01

## 2017-01-17 RX ADMIN — GUAIFENESIN 200 MG: 100 SOLUTION ORAL at 07:01

## 2017-01-17 RX ADMIN — ASPIRIN 81 MG: 81 TABLET, COATED ORAL at 10:01

## 2017-01-17 RX ADMIN — SODIUM CHLORIDE TAB 1 GM 1 G: 1 TAB at 05:01

## 2017-01-17 RX ADMIN — GUAIFENESIN 200 MG: 100 SOLUTION ORAL at 05:01

## 2017-01-17 RX ADMIN — GUAIFENESIN 200 MG: 100 SOLUTION ORAL at 10:01

## 2017-01-17 RX ADMIN — SODIUM CHLORIDE 50 MG: 9 INJECTION, SOLUTION INTRAVENOUS at 08:01

## 2017-01-17 RX ADMIN — MOXIFLOXACIN HYDROCHLORIDE 400 MG: 400 INJECTION, SOLUTION INTRAVENOUS at 12:01

## 2017-01-17 RX ADMIN — SODIUM CITRATE AND CITRIC ACID MONOHYDRATE 30 ML: 500; 334 SOLUTION ORAL at 10:01

## 2017-01-17 RX ADMIN — ACYCLOVIR SODIUM: 50 INJECTION, SOLUTION INTRAVENOUS at 05:01

## 2017-01-17 RX ADMIN — SODIUM CHLORIDE, PRESERVATIVE FREE 3 ML: 5 INJECTION INTRAVENOUS at 05:01

## 2017-01-17 RX ADMIN — SENNOSIDES 8.6 MG: 8.6 TABLET ORAL at 10:01

## 2017-01-17 RX ADMIN — SODIUM CITRATE AND CITRIC ACID MONOHYDRATE 30 ML: 500; 334 SOLUTION ORAL at 08:01

## 2017-01-17 RX ADMIN — POLYETHYLENE GLYCOL 3350 17 G: 17 POWDER, FOR SOLUTION ORAL at 10:01

## 2017-01-17 RX ADMIN — ACYCLOVIR SODIUM: 50 INJECTION, SOLUTION INTRAVENOUS at 09:01

## 2017-01-17 RX ADMIN — SODIUM CHLORIDE, PRESERVATIVE FREE 3 ML: 5 INJECTION INTRAVENOUS at 04:01

## 2017-01-17 RX ADMIN — METRONIDAZOLE 500 MG: 500 INJECTION, SOLUTION INTRAVENOUS at 06:01

## 2017-01-17 RX ADMIN — SODIUM CHLORIDE 50 MG: 9 INJECTION, SOLUTION INTRAVENOUS at 03:01

## 2017-01-17 RX ADMIN — DOCUSATE SODIUM 100 MG: 100 CAPSULE, LIQUID FILLED ORAL at 08:01

## 2017-01-17 RX ADMIN — GUAIFENESIN 200 MG: 100 SOLUTION ORAL at 04:01

## 2017-01-17 NOTE — PLAN OF CARE
Problem: Fall Risk (Adult)  Goal: Identify Related Risk Factors and Signs and Symptoms  Related risk factors and signs and symptoms are identified upon initiation of Human Response Clinical Practice Guideline (CPG)   Outcome: Ongoing (interventions implemented as appropriate)                Goal: Absence of Falls  Patient will demonstrate the desired outcomes by discharge/transition of care.   Outcome: Ongoing (interventions implemented as appropriate)                  Problem: Patient Care Overview  Goal: Plan of Care Review  Outcome: Ongoing (interventions implemented as appropriate)  Bed alarm activated and audible. Bed in low position. Side rails up x3. Call light within reach. Pt AAO to self only, reoriented pt to time, place, situation. Pt safety maintained. Educated pt and pt's family to call for any assistance or needs. No evidence of learning by pt, pt's family verbalized understanding. Non verbal indicator absent of pain or discomfort. Low air loss overlay in use. Tele monitor #[id number] in use, sinus rhythm noted with HR ranging in 80s-90s. No ectopy noted. Will continue to monitor.    Problem: Pressure Ulcer Risk (Rell Scale) (Adult,Obstetrics,Pediatric)  Goal: Identify Related Risk Factors and Signs and Symptoms  Related risk factors and signs and symptoms are identified upon initiation of Human Response Clinical Practice Guideline (CPG)   Outcome: Ongoing (interventions implemented as appropriate)                Goal: Skin Integrity  Patient will demonstrate the desired outcomes by discharge/transition of care.   Outcome: Ongoing (interventions implemented as appropriate)                  Problem: Restraint, Nonbehavioral (nonviolent)  Intervention: Education                Intervention: Restraint Monitoring Q2 hours w/Assessment for Injury                Intervention: Restraint Injuries Monitor Q2 hours                  Goal: Clinical Justification  Outcome: Ongoing (interventions implemented as  appropriate)                Goal: Absence of Injury/Harm  Outcome: Ongoing (interventions implemented as appropriate)                Goal: Discontinuation Criteria Achieved  Outcome: Ongoing (interventions implemented as appropriate)                Goal: Preservation of Dignity/Wellbeing  Outcome: Ongoing (interventions implemented as appropriate)                  Problem: Infection, Risk/Actual (Adult)  Goal: Identify Related Risk Factors and Signs and Symptoms  Related risk factors and signs and symptoms are identified upon initiation of Human Response Clinical Practice Guideline (CPG)   Outcome: Ongoing (interventions implemented as appropriate)                Goal: Infection Prevention/Resolution  Patient will demonstrate the desired outcomes by discharge/transition of care.   Outcome: Ongoing (interventions implemented as appropriate)

## 2017-01-17 NOTE — PROGRESS NOTES
Progress Note  Nephrology      Consult Requested By: Kory Dave MD      SUBJECTIVE:     Overnight events  Patient is a 89 y.o. female     Patient Active Problem List   Diagnosis    Dementia without behavioral disturbance    History of breast cancer    Pre-diabetes    Hypothyroidism    Dyslipidemia    Seizure    Seizures    Hyponatremia    Generalized convulsive epilepsy with intractable epilepsy     Past Medical History   Diagnosis Date    Cancer     Diabetes mellitus type II     Hypertension     Thyroid disease               OBJECTIVE:     Vitals:    01/17/17 0430 01/17/17 0800 01/17/17 0803 01/17/17 1243   BP: (!) 128/58 117/73     BP Location:       Patient Position: Sitting      BP Method: Automatic      Pulse: 83 82 86 79   Resp: 19 18 18 18   Temp: 98.2 °F (36.8 °C) 99.9 °F (37.7 °C)     TempSrc: Oral Oral     SpO2:   (!) 92% (!) 94%   Weight: 47.2 kg (104 lb)      Height:           Temp: 99.9 °F (37.7 °C) (01/17/17 0800)  Pulse: 79 (01/17/17 1243)  Resp: 18 (01/17/17 1243)  BP: 117/73 (01/17/17 0800)  SpO2: (!) 94 % (01/17/17 1243)               Medications:   custom IVPB builder   Intravenous Q8H    aspirin  81 mg Oral Daily    citric acid-sodium citrate 500-334 mg/5 ml  30 mL Oral Daily    docusate sodium  100 mg Oral BID    enoxaparin  40 mg Subcutaneous Daily    guaifenesin 100 mg/5 ml  200 mg Oral Q4H    lacosamide (VIMPAT) IVPB  50 mg Intravenous Q12H    levothyroxine  75 mcg Oral Daily    moxifloxacin  400 mg Intravenous Q24H    polyethylene glycol  17 g Oral Daily    pravastatin  20 mg Oral Daily    senna  8.6 mg Oral Daily    sodium chloride 0.9%  3 mL Intravenous Q8H    sodium chloride  1 g Oral TID                  Physical Exam:  General appearance: NAD  No SOB  Lungs: diminished  breath sounds  Heart: Pulse 79  Abdomen: soft  Extremities:   upper - edema 2+ R>l   Low - trace edema  Skin: dry  Laboratory:  ABG  Labs reviewed  Recent Results (from the past 336  hour(s))   Basic metabolic panel    Collection Time: 01/17/17 12:05 PM   Result Value Ref Range    Sodium 130 (L) 136 - 145 mmol/L    Potassium 3.8 3.5 - 5.1 mmol/L    Chloride 104 95 - 110 mmol/L    CO2 18 (L) 23 - 29 mmol/L    BUN, Bld 13 10 - 30 mg/dL    Creatinine 0.8 0.5 - 1.4 mg/dL    Calcium 7.9 (L) 8.7 - 10.5 mg/dL    Anion Gap 8 8 - 16 mmol/L   Basic Metabolic Panel (BMP)    Collection Time: 01/17/17  4:52 AM   Result Value Ref Range    Sodium 130 (L) 136 - 145 mmol/L    Potassium 3.9 3.5 - 5.1 mmol/L    Chloride 104 95 - 110 mmol/L    CO2 17 (L) 23 - 29 mmol/L    BUN, Bld 14 10 - 30 mg/dL    Creatinine 0.8 0.5 - 1.4 mg/dL    Calcium 7.8 (L) 8.7 - 10.5 mg/dL    Anion Gap 9 8 - 16 mmol/L   Basic metabolic panel    Collection Time: 01/16/17 11:48 PM   Result Value Ref Range    Sodium 130 (L) 136 - 145 mmol/L    Potassium 3.9 3.5 - 5.1 mmol/L    Chloride 102 95 - 110 mmol/L    CO2 19 (L) 23 - 29 mmol/L    BUN, Bld 16 10 - 30 mg/dL    Creatinine 0.9 0.5 - 1.4 mg/dL    Calcium 8.1 (L) 8.7 - 10.5 mg/dL    Anion Gap 9 8 - 16 mmol/L     Recent Results (from the past 336 hour(s))   CBC auto differential    Collection Time: 01/17/17  4:52 AM   Result Value Ref Range    WBC 21.66 (H) 3.90 - 12.70 K/uL    Hemoglobin 9.5 (L) 12.0 - 16.0 g/dL    Hematocrit 28.6 (L) 37.0 - 48.5 %    Platelets 344 150 - 350 K/uL   CBC auto differential    Collection Time: 01/16/17  5:09 AM   Result Value Ref Range    WBC 14.82 (H) 3.90 - 12.70 K/uL    Hemoglobin 10.2 (L) 12.0 - 16.0 g/dL    Hematocrit 31.0 (L) 37.0 - 48.5 %    Platelets 358 (H) 150 - 350 K/uL   CBC auto differential    Collection Time: 01/15/17  6:19 AM   Result Value Ref Range    WBC 10.65 3.90 - 12.70 K/uL    Hemoglobin 9.0 (L) 12.0 - 16.0 g/dL    Hematocrit 25.8 (L) 37.0 - 48.5 %    Platelets 301 150 - 350 K/uL     Urinalysis  No results for input(s): COLORU, CLARITYU, SPECGRAV, PHUR, PROTEINUA, GLUCOSEU, BILIRUBINCON, BLOODU, WBCU, RBCU, BACTERIA, MUCUS, NITRITE,  LEUKOCYTESUR, UROBILINOGEN, HYALINECASTS in the last 24 hours.    Diagnostic Results:  X-Ray: Reviewed  US: Reviewed  Echo: Reviewed  ACCESS    ASSESSMENT/PLAN:   CKD 3.  Metabolic acidosis. On bicitra.  Hyponatremia. Stable.  Blood pressure 117/73.  I and O.

## 2017-01-17 NOTE — PROGRESS NOTES
[facility] Resident [facility] Progress Note    Subjective:    Patient unable to answer questions this morning due to mental status and patient is deaf.     Objective:   Last 24 Hour Vital Signs:  BP  Min: 72/40  Max: 149/63  Temp  Av.7 °F (37.1 °C)  Min: 98 °F (36.7 °C)  Max: 99.4 °F (37.4 °C)  Pulse  Av  Min: 80  Max: 97  Resp  Av.2  Min: 15  Max: 20  SpO2  Av.7 %  Min: 89 %  Max: 93 %  Weight  Av.2 kg (104 lb)  Min: 47.2 kg (104 lb)  Max: 47.2 kg (104 lb)  I/O last 3 completed shifts:  In: 1200 [I.V.:50; IV Piggyback:1150]  Out: 308 [Urine:308]    Physical Examination:    General:   Sleeping in bed, in no apparent distress  Head:    Normocephalic and atraumatic  Eyes:    PERRL; EOMi with anicteric sclera and clear conjunctivae  Mouth:   Oropharynx clear and without exudate; moist mucous membranes  Cardio:   Regular rate and rhythm with normal S1 and S2; no murmurs or rubs  Resp:    CTAB; respirations unlabored; no wheezes, crackles or rhonchi  Abdom:   Soft, NTND with normoactive bowel sounds  Extrem:   Warm and well-perfused with no clubbing, cyanosis or edema  Skin:     No rashes, lesions, or color changes  Pulses:   2+ and symmetric distally  Neuro:   Responds to light touch, moves all extremities spontaneously.    Laboratory:  Laboratory Data  Pertinent Findings:  Recent Labs      01/15/17   0619  17   0509  17   0452   WBC  10.65  14.82*  21.66*   HGB  9.0*  10.2*  9.5*   HCT  25.8*  31.0*  28.6*   PLT  301  358*  344   MCV  81*  83  83   RDW  14.2  14.5  14.9*   GRAN  66.4  7.1  79.1*  11.7*   --    LYMPH  18.5  2.0  10.7*  1.6   --    MONO  11.4  1.2*  8.8  1.3*   --    EOS  0.3  0.1   --    BASO  0.08  0.07   --    EOSINOPHIL  2.6  0.6   --    BASOPHIL  0.8  0.5   --        Recent Labs      17   0509  17   1151  17   1750  17   2348  17   0452   NA  130*  131*  130*  130*  130*   K  4.2  4.2  4.1  3.9  3.9   CL  100  100  101   102  104   CO2  17*  14*  15*  19*  17*   BUN  12  15  16  16  14   CREATININE  0.8  0.8  0.9  0.9  0.8     No results for input(s): POCTGLUCOSE in the last 72 hours.  No results for input(s): PROT, ALBUMIN, BILITOT, AST, ALT, ALKPHOS in the last 72 hours.    Microbiology Data  Pertinent Findings:  No new data    Other Results:  EKG (my interpretation): No new EKG    Radiology Data   Pertinent Findings (my interpretation):  No new imaging    Current Medications:     Infusions:        Scheduled:   custom IVPB builder   Intravenous Q8H    aspirin  81 mg Oral Daily    citric acid-sodium citrate 500-334 mg/5 ml  30 mL Oral Daily    docusate sodium  100 mg Oral BID    enoxaparin  40 mg Subcutaneous Daily    guaifenesin 100 mg/5 ml  200 mg Oral Q4H    lacosamide (VIMPAT) IVPB  50 mg Intravenous Q12H    levothyroxine  75 mcg Oral Daily    moxifloxacin  400 mg Intravenous Q24H    polyethylene glycol  17 g Oral Daily    pravastatin  20 mg Oral Daily    senna  8.6 mg Oral Daily    sodium chloride 0.9%  3 mL Intravenous Q8H    sodium chloride  1 g Oral TID        PRN:  dextrose 50%, dextrose 50%, glucagon (human recombinant), glucose, glucose    Antibiotics and Day Number of Therapy:  Moxicfloxacin 400mg IV q24hr    Lines and Day Number of Therapy:  PIV x1 20G    Assessment:     Sue Capone is a 89 y.o.female with  Patient Active Problem List    Diagnosis Date Noted    Hyponatremia 01/16/2017    Generalized convulsive epilepsy with intractable epilepsy     Seizure 01/11/2017    Seizures 01/11/2017    Dementia without behavioral disturbance 09/01/2016    History of breast cancer 09/01/2016    Pre-diabetes 09/01/2016    Hypothyroidism 09/01/2016    Dyslipidemia 09/01/2016        Plan:     89 yo F w/ medical hx of Pneumonia and UTI discharged 1 day ago from ED on Levofloxacin, hypothyroidism, HLD, Breast CA w/ resection and radiation in 1995 with complaint of seizure x1 day presents with      Generalized  Tonic Clonic Seizures  - DDx includes meningitis/encephalitis vs hyponatremia  - Tonic clonic seizures x2 witnessed, no loss bowel/bladder, first self resolved, seizure in ED resolved after Lorazepam 1mg IV  - AMS, fever and chills x2 days. Sent home from ED on 1/10/17 with Levofloxacin 750mg PO for pneumonia (CURB 65 =1)  - Initial vitals, afebrile 98.2F, HR 90, /74, SaO2 95% RA  - At admission, WBC 15.2, afebrile. No nuchal rigidity.   - 1/12/17 Blood cx x2: no growth to date  - 1/11/17 LP results: Colorless, WBC 6, , Diff: 42% Neut, 53% Lymph, Glu 69, Prot 53  - 1/11/17 MRI Brain: Motion limited examination, no intracranial mass lesion or acute pathology, mild chronic ischemic changes  - 1/12/17 EEG: static encephalopathy without focal changes nor an epileptic process   - 1/11/17 HSV CSF PCR: Negative  - Consult Neurology (Christian): MRI neg, consider MRI w/ epilespy protocol. EEG encephalopathic, but no epileptiform activity noted. Continue Vimpat  - Patient currelty on Acyclovir IV for meningitis/encephalitis, will continue due to elevated RBC in CSF.  - Decreased dose of Vimpat to 50mg IV q12 due to potentially sedating effects      Hypovolemic Hyponatremia  - 1/13/17 Na 122, Urine Osm 500, Urine Na 72.  - Urine labs indicate possible SIADH vs RTA4. Ruled out hypothyroid (TSH/T4 wnl), Marcola's (AM cortisol wnl)  - 1/15/17 CT Chest: L main bronchus, LLL & ELIAS mucus plug vs endobronchial lesion. Post obstructive atelectasis vs consolidation.  - As per nephrology recommendations, continue free water restrict, PO salt tabs, Lasix IV. Monitor q6hr BMP. No indication for 3% saline at this time. If no clinical improvement, consider Vaptan  - Continue to monitor BMP, titrate salt tabs, fluid restrict, continue lasix pushes as per nephrology, consider administering Vaptan in ICU if sodium decreases.  - Currently, Na 130. Continuing to remain stable at Na 130 with fluid restriction and salt  tabs.     Community Acquired Pneumonia  - 1/9/17 CXR: RLL consolidation  - At admission on exam, Lungs CTAB  - Negative Procalcitonin <0.09  - Discontinued Vancomycin / Moxifloxacin (de-escalated from Vanc/Aztreonam/Bactrim IV)      Non-Anion Gap Metabolic Acidosis  - 1/14/17 HCO3- 15, HCO3- decreasing since admission  - Possibly RTA, consider urine studies  - Continue to monitor BMP      Normocytic Anemia  - At admission, H/H 11.2/33.5 MCV 83  - 1/11/17 Iron 44, Ferritin 115. B12 and Folate wnl  - Consider treatment with PO iron supplementation after acute illness.      Constipation  - As per family, no BM in 5 days  - In ED, patient disimpacted  - Continue miralax PRN, colace. Consider lactulose supp. May add/titrate meds as needed  - BM reported 1/12  - Continue colace, senna, miralax prn      Hypothyroidism  - 1/11/17 TSH: 2.219, FT4 1.18  - Continue home synthroid      HLD  - 1/3/17 Lipid Panel: TChol 153, LDL 71  - Continue home statin      DVT PPx: Lovenox  Diet: Pureed  Code: DNR  Social: Lives at home. PT/OT: family refuses HH PT/OT. DME hospital bed      Disposition: Pending clinical improvement and workup of hyponatremia    Ming Bai  \[facility]\"" Internal Medicine HO-I  U Internal Medicine Service Team B    \[facility]\"" Medicine Hospitalist Pager numbers:   \[facility]\"" Hospitalist Medicine Team A (Benita/Elpidio): [phone]  \[facility]\"" Hospitalist Medicine Team B (Jolie/Jorge):  [phone]

## 2017-01-17 NOTE — CONSULTS
"[facility] Infectious Disease Consult - Resident Note    Primary Attending Physician: Dr. Dave  Primary Team: [facility] Internal Medicine  Consultant Attending: Dr. Slade  : Ezekiel    Reason for Consult:     Possible aspiration PNA    Subjective:      History of Present Illness:  Sue Capone is a 89 y.o. female who  has a past medical history of Cancer; Diabetes mellitus type II; Hypertension; and Thyroid disease.. The patient presented to the [facility] on 1/11/2017 with a primary complaint of Seizures (witnessed seizure by family, seen here yesterday for pneumonia)    History was given by son, as patient is not fully oriented. Per son, at baseline, patient was more conversational, although she depends on son and his wife for ADLs and ambulate with assistance. Since Sat 1/7, patient appeared more confused to son, not responding to questions/prompts as normally would, which prompted visit to urgent care center on 1/9 where patient was told to have "UTI and CXR done," and prescribed Levaquin 250mg daily. On 1/10, patient visited ED with CXR results showed pulm edema vs PNA. Patient discharged with increased Levaquin to 750mg daily. On date of admit 1/11, son reported patient had symptoms of seizures, shaking and drooling for less than a minute, and resolved, no urinary/bowell incontinence. Patient was brought to ED and had another episode of seizure which resolved with ativan.       Hospital Course:  Patient presented afebrile, with WBC 15.7, which slightly improved but increased to 21.66 on 1/17, procal negative  CXR 1/9 with RLL consolidation, CXR 1/11 with increased interstitial markings b/l and increased markings at left base unchanged  LP 1/11 PCR HSV negative, gram negative, BCx 1/11 NG  PCR CSF 1/11 no organism, rare WBC  CSF cell count: 6 WBC, 221 RBC, 42% neut, 53% lymphs  CT Chest 1/15 with extensive opacity in the left mainstem bronchus and left upper and left lower lobe bronchi " favored to reflect mucous plugging although endobronchial lesions are not excluded, Post obstructive atelectasis vs consolidation in the basal segments of the left lower lobe. This could reflect aspiration pneumonia.    Initially on Vanc/Aztreonam/Bactrim/Acyclovir then desescalated to Acyclovir/Moxi. Neuro was consulted for management of seizures, EEG with static encephalopathy without focal changes/epileptic process, MRI no mass lesions/acute abnl, initially Keppra then switched to Vimpat.      Past Medical History:  Past Medical History   Diagnosis Date    Cancer     Diabetes mellitus type II     Hypertension     Thyroid disease        Past Surgical History:  Past Surgical History   Procedure Laterality Date    Hysterectomy      Breast surgery      Shoulder surgery       left    Cholecystectomy         Allergies:  Review of patient's allergies indicates:   Allergen Reactions    Codeine     Penicillins        Medications:   In-Hospital Scheduled Medications:   custom IVPB builder   Intravenous Q8H    aspirin  81 mg Oral Daily    citric acid-sodium citrate 500-334 mg/5 ml  30 mL Oral BID    docusate sodium  100 mg Oral BID    enoxaparin  40 mg Subcutaneous Daily    guaifenesin 100 mg/5 ml  200 mg Oral Q4H    lacosamide (VIMPAT) IVPB  50 mg Intravenous Q12H    levothyroxine  75 mcg Oral Daily    moxifloxacin  400 mg Intravenous Q24H    polyethylene glycol  17 g Oral Daily    pravastatin  20 mg Oral Daily    senna  8.6 mg Oral Daily    sodium chloride 0.9%  3 mL Intravenous Q8H      In-Hospital PRN Medications:  dextrose 50%, dextrose 50%, glucagon (human recombinant), glucose, glucose   In-Hospital IV Infusion Medications:      Home Medications:  Prior to Admission medications    Medication Sig Start Date End Date Taking? Authorizing Provider   aspirin (ECOTRIN) 81 MG EC tablet Take 81 mg by mouth once daily.    Historical Provider, MD   levothyroxine (SYNTHROID) 75 MCG tablet Take 1 tablet  (75 mcg total) by mouth once daily. 16   Collins Willams MD   pravastatin (PRAVACHOL) 20 MG tablet Take 1 tablet (20 mg total) by mouth once daily. 16   Collins Willams MD       Family History:  History reviewed. No pertinent family history.    Social History:  Social History   Substance Use Topics    Smoking status: Former Smoker    Smokeless tobacco: Never Used    Alcohol use No       Review of Systems:  Pertinent items are noted in HPI. All other systems are reviewed and are negative.    Health Maintenance:   Patient's PCP is: Imer    Immunizations:   Currently on File with [facility]:   Most Recent Immunizations   Administered Date(s) Administered    Influenza - High Dose 2017    Pneumococcal Conjugate - 13 Valent 2017     Objective:   Last 24 Hour Vital Signs:  BP  Min: 117/73  Max: 149/63  Temp  Av.2 °F (37.3 °C)  Min: 98.2 °F (36.8 °C)  Max: 99.9 °F (37.7 °C)  Pulse  Av.7  Min: 79  Max: 97  Resp  Av.7  Min: 18  Max: 20  SpO2  Av.8 %  Min: 89 %  Max: 94 %  Weight  Av.2 kg (104 lb)  Min: 47.2 kg (104 lb)  Max: 47.2 kg (104 lb)  I/O last 3 completed shifts:  In: 1270 [P.O.:120; IV Piggyback:1150]  Out: 674 [Urine:674]  Body mass index is 19.02 kg/(m^2).    Physical Examination:  GEN: resting in bed, alert and awake, NAD, son at bedside  HENT: atraumatic, normocephalic, PERRL, EOMI, throat without erythema/exduate, no LAD  Lungs: diffused upper airway noises, no crackles/wheezes  Heart:  RRR, no murmurs appreciated  Abd: soft, non-tender, non-distended, active BS  Extremities: b/l UE anasarca, LE diffused ecchymosis, no edema LE  Neuro: oriented to person and place, no time/situation, moves extremities on commands     Laboratory Results:  Most Recent Data:  CBC: Lab Results   Component Value Date    WBC 21.66 (H) 2017    HGB 9.5 (L) 2017    HCT 28.6 (L) 2017     2017    MCV 83 2017    RDW 14.9 (H)  01/17/2017     WBC Differential: 86% N, 5% Bands, 4% L, 4% M, 1% Eo, 0% Bas, with ovalo/aniso/poik additional cells seen    BMP: Lab Results   Component Value Date     (L) 01/17/2017    K 3.8 01/17/2017     01/17/2017    CO2 18 (L) 01/17/2017    BUN 13 01/17/2017    CREATININE 0.8 01/17/2017     (H) 01/17/2017    CALCIUM 7.9 (L) 01/17/2017    MG 1.5 (L) 01/15/2017     LFTs: Lab Results   Component Value Date    PROT 6.8 01/11/2017    ALBUMIN 3.6 01/11/2017    BILITOT 0.6 01/11/2017    AST 28 01/11/2017    ALKPHOS 59 01/11/2017    ALT 13 01/11/2017     Coags:   Lab Results   Component Value Date    INR 1.0 11/28/2016     FLP: Lab Results   Component Value Date    CHOL 153 01/03/2017    HDL 62 01/03/2017    LDLCALC 71.6 01/03/2017    TRIG 97 01/03/2017    CHOLHDL 40.5 01/03/2017     DM: Lab Results   Component Value Date    HGBA1C 6.4 (H) 09/01/2016    LDLCALC 71.6 01/03/2017    CREATININE 0.8 01/17/2017     Thyroid: Lab Results   Component Value Date    TSH 2.219 01/11/2017    FREET4 1.18 01/11/2017     Anemia: Lab Results   Component Value Date    IRON 44 01/11/2017    TIBC 275 01/11/2017    FERRITIN 115 01/11/2017    SNOFOWTM00 560 01/11/2017    FOLATE 12.4 01/11/2017     Cardiac: Lab Results   Component Value Date    TROPONINI 0.010 01/11/2017     (H) 01/11/2017     Urinalysis: Lab Results   Component Value Date    LABURIN No significant growth 01/09/2017    COLORU Yellow 01/09/2017    SPECGRAV 1.020 01/09/2017    NITRITE Negative 01/09/2017    KETONESU Negative 01/09/2017    UROBILINOGEN Negative 01/09/2017       Trended Lab Data:    Recent Labs  Lab 01/10/17  1554 01/11/17  1700  01/15/17  0619  01/16/17  0509  01/16/17  2348 01/17/17  0452 01/17/17  1205   WBC 14.79* 15.20*  < > 10.65  --  14.82*  --   --  21.66*  --    HGB 11.6* 11.2*  < > 9.0*  --  10.2*  --   --  9.5*  --    HCT 35.3* 33.5*  < > 25.8*  --  31.0*  --   --  28.6*  --     354*  < > 301  --  358*  --   --  344   --    MCV 84 83  < > 81*  --  83  --   --  83  --    RDW 14.8* 14.8*  < > 14.2  --  14.5  --   --  14.9*  --    * 133*  < > 125*  < > 130*  < > 130* 130* 130*   K 3.9 4.0  < > 3.2*  < > 4.2  < > 3.9 3.9 3.8    103  < > 99  < > 100  < > 102 104 104   CO2 23 20*  < > 17*  < > 17*  < > 19* 17* 18*   BUN 18 19  < > 10  < > 12  < > 16 14 13   CREATININE 0.8 0.8  < > 0.8  < > 0.8  < > 0.9 0.8 0.8   GLU 91 122*  < > 100  < > 51*  < > 211* 159* 205*   PROT 7.3 6.8  --   --   --   --   --   --   --   --    ALBUMIN 3.6 3.6  --   --   --   --   --   --   --   --    BILITOT 0.5 0.6  --   --   --   --   --   --   --   --    AST 32 28  --   --   --   --   --   --   --   --    ALKPHOS 60 59  --   --   --   --   --   --   --   --    ALT 13 13  --   --   --   --   --   --   --   --    < > = values in this interval not displayed.    Trended Cardiac Data:    Recent Labs  Lab 01/10/17  1554 01/11/17  1700   TROPONINI  --  0.010   * 227*       Microbiology Data:  Microbiology Results (last 7 days)     Procedure Component Value Units Date/Time    Blood culture [[id number]] Collected:  01/17/17 1656    Order Status:  Sent Specimen:  Blood Updated:  01/17/17 1656    Blood culture [[id number]] Collected:  01/17/17 1656    Order Status:  Sent Specimen:  Blood Updated:  01/17/17 1656    Culture, Respiratory with Gram Stain [[id number]]     Order Status:  No result Specimen:  Respiratory     CSF culture and Gram Stain (Tube 2) [[id number]] Collected:  01/11/17 2110    Order Status:  Completed Specimen:  CSF (Spinal Fluid) from CSF Tap, Tube 2 Updated:  01/17/17 0711     CSF CULTURE No Growth     Gram Stain Result Rare WBC's      No organisms seen    Narrative:       On which sequentially labeled tube should this analysis be  performed?->2    Blood culture (site 2) [[id number]] Collected:  01/11/17 2124    Order Status:  Completed Specimen:  Blood from Peripheral, Wrist, Right Updated:  01/17/17 0612     Blood Culture, Routine No  growth after 5 days.    Narrative:       Site # 2, aerobic only    Blood culture (site 1) [[id number]] Collected:  01/11/17 2116    Order Status:  Completed Specimen:  Blood from Peripheral, Hand, Right Updated:  01/17/17 0612     Blood Culture, Routine No growth after 5 days.    Narrative:       Site # 1, aerobic and anaerobic (central line, if patient has  one)    CSF culture [[id number]] Collected:  01/11/17 2103    Order Status:  Completed Specimen:  CSF (Spinal Fluid) from CSF Tap, Tube 1 Updated:  01/12/17 0335    Narrative:       Culture CSF was cancelled on 01/12/2017 at 03:35 by CVG; duplicate   order see order #[id number] 01/12/2017  03:35            Other Laboratory Data:      Other Results:    Radiology:  X-ray Chest 1 View    Result Date: 1/14/2017  Chest single view compared to 01/11/2017.  Dextroscoliosis.  Heart is mildly enlarged.  Calcification and tortuosity of thoracic aorta.  There is increase in the interstitial markings bilaterally.   increased markings at the left base unchanged.     Abnormal study similar to prior. Electronically signed by: MARGARITA SAMUELS MD Date:     01/14/17 Time:    12:20     X-ray Chest 1 View    Result Date: 1/11/2017  HISTORY: Coughing COMPARISON: Chest one view 01/10/17 FINDINGS: No new lung opacities.  No pneumothorax. No change in the cardiopulmonary status of the patient when compared to the prior.    #1. As Above. Electronically signed by: TONIE CISNEROS MD Date:     01/11/17 Time:    18:41     X-ray Chest 1 View    Result Date: 1/10/2017  One view: Heart size is normal.  There is aortic plaque.  There is mild edema DJD.     Mild interstitial edema versus pneumonia. Electronically signed by: KENYATTA REICH Date:     01/10/17 Time:    11:43     Ct Head Without Contrast    Result Date: 1/11/2017  HISTORY: Change in mental status COMPARISON: CT head 11/28/16 FINDINGS: This is a suboptimal exam as the patient was unable to remain still/motionless during the acquisition  of the images.  This produces artifact that reduces sensitivity and specificity. There is a remote right basal ganglia lacunar-type infarct.  There is chronic white matter ischemic change.  The brain parenchyma is otherwise unremarkable with no evidence of hemorrhage, mass, or acute infarct.  Other than compensatory enlargement, the ventricular system demonstrates no distortion by mass effect.  No extra-axial hemorrhage or mass. The extracranial structures are unremarkable.  The osseous structures are unremarkable.    #1. Suboptimal exam as above.  Given the limitations, no definitive acute intracranial abnormalities are identified. Electronically signed by: TONIE CISNEROS MD Date:     01/11/17 Time:    18:37     Ct Chest Without Contrast    Result Date: 1/15/2017  EXAM:Thoracic CT without contrast. CLINICAL INDICATION:  SIADH, pneumonia. COMPARISON: None. TECHNIQUE: 2.5 mm axial images of the chest obtained without intravenous contrast, coronal and sagittal reconstructions generated.  FINDINGS:  The structures at the base of the neck are unremarkable. There is a left-sided aortic arch with three branch vessels.  The aorta is normal in caliber, contour, and course.  There is severe atherosclerotic calcification of the aorta and its branches. The mediastinal structures are midline.  The heart is not enlarged.  There is no mediastinal or hilar lymph node enlargement.  No pericardial fluid is seen. There is soft tissue attenuation in the left mainstem bronchus and left lower lobe bronchi most suggestive of mucous plugging.  There is aeration of lung distal to these areas.  An area of consolidation versus postobstructive atelectasis is present in the basal segments of the left lower lobe which could reflect aspiration pneumonia.  There is moderate bilateral pleural fluid.  Severe osteopenia and moderate degenerative change of the spine.     1.  Extensive opacity in the left mainstem bronchus and left upper and left  lower lobe bronchi favored to reflect mucous plugging although endobronchial lesions are not excluded. 2.  Post obstructive atelectasis versus consolidation in the basal segments of the left lower lobe.  This could reflect aspiration pneumonia. 3.  Bilateral pleural fluid. 4.  Severe osteopenia. 5.  Severe atherosclerotic calcification of the aorta and its branches. Electronically signed by: ZENON MORRIS MD Date:     01/15/17 Time:    15:02     Mri Brain W Wo Contrast    Result Date: 1/12/2017  MRI brain with contrast 01/12/17 11:08:14 Accession# [id number] CLINICAL INDICATION: 89 year old F with new onset seizures, history of breast cancer  TECHNIQUE: Multiplanar multisequence MR imaging of the brain was performed before and after the administration of 5 ml Gadavistintravenous contrast. Examination moderately degraded by patient motion artifact, despite attempts to minimize in correct for artifact. This particularly affects the latter post contrast sequences. COMPARISON:  Head CT 01/11/2017 FINDINGS: The ventricles are normal in size for age, without evidence of hydrocephalus. Mild chronic microvascular ischemic changes at the supratentorial white matter with remote lacunar type infarct in the right corona radii the. Small remote right frontal infarct. No compelling evidence of mass lesion, hemorrhage, edema or recent or remote major vascular distribution infarction. No apparent abnormal postcontrast parenchymal or leptomeningeal enhancement. No extra-axial blood or fluid collections. T2 skull base flow voids are preserved. Bone marrow signal intensity is unremarkable. Prior cataract surgery.    Motion limited examination despite attempts to minimize and correct for artifact. No compelling evidence of intracranial mass lesion or other acute pathology. Mild chronic ischemic changes as above. Electronically signed by: RAJAT SOTELO MD Date:     01/12/17 Time:    12:43     Fl Modified Barium Swallow Speech    Result  Date: 1/13/2017  Modified barium swallow Fluoroscopic assistance provided to speech pathologist for modified barium swallow evaluation. Fluoroscopy time: 1 minute 36 seconds. Findings: Examination limited due to patient immobility.  Note made of aspiration with thin liquids.  Please see speech pathologist report for detailed findings.     As above. Electronically signed by: ZAKI PAREDES MD Date:     01/13/17 Time:    11:46         Antibiotics and Day Number of Therapy:  Antibiotics     Start     Stop Route Frequency Ordered    01/16/17 1145  moxifloxacin 400 mg/250 mL IVPB 400 mg      -- IV Every 24 hours (non-standard times) 01/16/17 1039    01/17/17 1715  metronidazole IVPB 500 mg      -- IV Every 8 hours (non-standard times) 01/17/17 1611             Assessment:     Patient is a 89 yo woman with:    CAP/Possible Aspiration PNA  Generalized Tonic Clonic Seizures  Hyponatremia  Non AGMA  Hypothyrodism  HLD     Recommendations:     CAP vs HCAP vs Asp PNA  - Consider stopping acyclovir tomorrow given neg PCR, possible traumatic tap, will discuss with primary team   - Will order RPR, Cryptococcal Ag given slightly elevated LP WBC 6, Resp Cx, will repeat BCx  - continue Moxifloxacin 400mg daily, will add Flagyl 500mg q8 for possible aspiration PNA    Generalized Tonic Seizures  - management per Neuro recs    Hyponatremia/Non AGMA/Hypothyrodism/HLD   - management per primary team    Case discussed with staff, Dr. Slade  Thank you for allowing us to participate in the care of this patient. Please contact me at [phone] if you have any questions regarding this consult.    Mohit Falcon  [facility]  [facility]

## 2017-01-18 LAB
ANION GAP SERPL CALC-SCNC: 9 MMOL/L
BASOPHILS # BLD AUTO: 0.08 K/UL
BASOPHILS NFR BLD: 0.6 %
BUN SERPL-MCNC: 15 MG/DL
CALCIUM SERPL-MCNC: 7.4 MG/DL
CHLORIDE SERPL-SCNC: 106 MMOL/L
CO2 SERPL-SCNC: 17 MMOL/L
CREAT SERPL-MCNC: 0.8 MG/DL
DIASTOLIC DYSFUNCTION: YES
DIFFERENTIAL METHOD: ABNORMAL
EOSINOPHIL # BLD AUTO: 0.1 K/UL
EOSINOPHIL NFR BLD: 0.3 %
ERYTHROCYTE [DISTWIDTH] IN BLOOD BY AUTOMATED COUNT: 15 %
EST. GFR  (AFRICAN AMERICAN): >60 ML/MIN/1.73 M^2
EST. GFR  (NON AFRICAN AMERICAN): >60 ML/MIN/1.73 M^2
ESTIMATED PA SYSTOLIC PRESSURE: 12.58
GLUCOSE SERPL-MCNC: 125 MG/DL
HCT VFR BLD AUTO: 25.2 %
HGB BLD-MCNC: 8.4 G/DL
LYMPHOCYTES # BLD AUTO: 1.8 K/UL
LYMPHOCYTES NFR BLD: 12.6 %
MCH RBC QN AUTO: 27.5 PG
MCHC RBC AUTO-ENTMCNC: 33.3 %
MCV RBC AUTO: 83 FL
MITRAL VALVE REGURGITATION: ABNORMAL
MONOCYTES # BLD AUTO: 1.7 K/UL
MONOCYTES NFR BLD: 11.7 %
NEUTROPHILS # BLD AUTO: 10.8 K/UL
NEUTROPHILS NFR BLD: 74.5 %
PLATELET # BLD AUTO: 308 K/UL
PMV BLD AUTO: 10.1 FL
POCT GLUCOSE: 184 MG/DL (ref 70–110)
POCT GLUCOSE: 192 MG/DL (ref 70–110)
POCT GLUCOSE: 202 MG/DL (ref 70–110)
POTASSIUM SERPL-SCNC: 4.1 MMOL/L
RBC # BLD AUTO: 3.05 M/UL
RETIRED EF AND QEF - SEE NOTES: 62 (ref 55–65)
RPR SER QL: NORMAL
SODIUM SERPL-SCNC: 132 MMOL/L
WBC # BLD AUTO: 14.44 K/UL

## 2017-01-18 PROCEDURE — 93010 ELECTROCARDIOGRAM REPORT: CPT | Mod: ,,, | Performed by: INTERNAL MEDICINE

## 2017-01-18 PROCEDURE — 93005 ELECTROCARDIOGRAM TRACING: CPT

## 2017-01-18 PROCEDURE — 94761 N-INVAS EAR/PLS OXIMETRY MLT: CPT

## 2017-01-18 PROCEDURE — 80048 BASIC METABOLIC PNL TOTAL CA: CPT

## 2017-01-18 PROCEDURE — 97530 THERAPEUTIC ACTIVITIES: CPT

## 2017-01-18 PROCEDURE — 85025 COMPLETE CBC W/AUTO DIFF WBC: CPT

## 2017-01-18 PROCEDURE — 93010 ELECTROCARDIOGRAM REPORT: CPT | Mod: 77,,, | Performed by: INTERNAL MEDICINE

## 2017-01-18 PROCEDURE — 11000001 HC ACUTE MED/SURG PRIVATE ROOM

## 2017-01-18 PROCEDURE — 25000003 PHARM REV CODE 250: Performed by: INTERNAL MEDICINE

## 2017-01-18 PROCEDURE — 25000003 PHARM REV CODE 250: Performed by: STUDENT IN AN ORGANIZED HEALTH CARE EDUCATION/TRAINING PROGRAM

## 2017-01-18 PROCEDURE — C9254 INJECTION, LACOSAMIDE: HCPCS | Performed by: INTERNAL MEDICINE

## 2017-01-18 PROCEDURE — 93306 TTE W/DOPPLER COMPLETE: CPT

## 2017-01-18 PROCEDURE — 63600175 PHARM REV CODE 636 W HCPCS: Performed by: INTERNAL MEDICINE

## 2017-01-18 PROCEDURE — 86592 SYPHILIS TEST NON-TREP QUAL: CPT

## 2017-01-18 PROCEDURE — 36415 COLL VENOUS BLD VENIPUNCTURE: CPT

## 2017-01-18 PROCEDURE — 94668 MNPJ CHEST WALL SBSQ: CPT

## 2017-01-18 PROCEDURE — 86403 PARTICLE AGGLUT ANTBDY SCRN: CPT

## 2017-01-18 PROCEDURE — 27000221 HC OXYGEN, UP TO 24 HOURS

## 2017-01-18 RX ORDER — SODIUM CITRATE AND CITRIC ACID MONOHYDRATE 334; 500 MG/5ML; MG/5ML
30 SOLUTION ORAL 3 TIMES DAILY
Status: DISCONTINUED | OUTPATIENT
Start: 2017-01-18 | End: 2017-01-23 | Stop reason: HOSPADM

## 2017-01-18 RX ADMIN — SODIUM CHLORIDE 1000 ML: 0.9 INJECTION, SOLUTION INTRAVENOUS at 08:01

## 2017-01-18 RX ADMIN — SODIUM CITRATE AND CITRIC ACID MONOHYDRATE 30 ML: 500; 334 SOLUTION ORAL at 09:01

## 2017-01-18 RX ADMIN — PRAVASTATIN SODIUM 20 MG: 20 TABLET ORAL at 09:01

## 2017-01-18 RX ADMIN — SODIUM CITRATE AND CITRIC ACID MONOHYDRATE 30 ML: 500; 334 SOLUTION ORAL at 10:01

## 2017-01-18 RX ADMIN — SODIUM CHLORIDE 50 MG: 9 INJECTION, SOLUTION INTRAVENOUS at 04:01

## 2017-01-18 RX ADMIN — SODIUM CHLORIDE 50 MG: 9 INJECTION, SOLUTION INTRAVENOUS at 03:01

## 2017-01-18 RX ADMIN — ASPIRIN 81 MG: 81 TABLET, COATED ORAL at 09:01

## 2017-01-18 RX ADMIN — METRONIDAZOLE 500 MG: 500 INJECTION, SOLUTION INTRAVENOUS at 06:01

## 2017-01-18 RX ADMIN — METRONIDAZOLE 500 MG: 500 INJECTION, SOLUTION INTRAVENOUS at 09:01

## 2017-01-18 RX ADMIN — LEVOTHYROXINE SODIUM 75 MCG: 75 TABLET ORAL at 09:01

## 2017-01-18 RX ADMIN — GUAIFENESIN 200 MG: 100 SOLUTION ORAL at 10:01

## 2017-01-18 RX ADMIN — SENNOSIDES 8.6 MG: 8.6 TABLET ORAL at 09:01

## 2017-01-18 RX ADMIN — ENOXAPARIN SODIUM 40 MG: 100 INJECTION SUBCUTANEOUS at 12:01

## 2017-01-18 RX ADMIN — POLYETHYLENE GLYCOL 3350 17 G: 17 POWDER, FOR SOLUTION ORAL at 09:01

## 2017-01-18 RX ADMIN — SODIUM CHLORIDE, PRESERVATIVE FREE 3 ML: 5 INJECTION INTRAVENOUS at 10:01

## 2017-01-18 RX ADMIN — GUAIFENESIN 200 MG: 100 SOLUTION ORAL at 03:01

## 2017-01-18 RX ADMIN — GUAIFENESIN 200 MG: 100 SOLUTION ORAL at 06:01

## 2017-01-18 RX ADMIN — SODIUM CHLORIDE, PRESERVATIVE FREE 3 ML: 5 INJECTION INTRAVENOUS at 03:01

## 2017-01-18 RX ADMIN — SODIUM CITRATE AND CITRIC ACID MONOHYDRATE 30 ML: 500; 334 SOLUTION ORAL at 03:01

## 2017-01-18 RX ADMIN — METRONIDAZOLE 500 MG: 500 INJECTION, SOLUTION INTRAVENOUS at 12:01

## 2017-01-18 RX ADMIN — SODIUM CHLORIDE 1000 ML: 0.9 INJECTION, SOLUTION INTRAVENOUS at 03:01

## 2017-01-18 RX ADMIN — SODIUM CHLORIDE 500 ML: 0.9 INJECTION, SOLUTION INTRAVENOUS at 03:01

## 2017-01-18 RX ADMIN — MOXIFLOXACIN HYDROCHLORIDE 400 MG: 400 INJECTION, SOLUTION INTRAVENOUS at 12:01

## 2017-01-18 RX ADMIN — DOCUSATE SODIUM 100 MG: 100 CAPSULE, LIQUID FILLED ORAL at 09:01

## 2017-01-18 NOTE — CONSULTS
"Consult   [facility]       SUBJECTIVE:     Consult reason : aifb, hypotension     History of Present Illness:  Ms. Sue Capone is a 89 y.o. with a past medical history of DMII , HTN , hypothyroidism , R breast cancer ( s/p radiaiton , ? Chemo ) here initially with seizures. She had been apparently seen as an urgent care and started on levaquin for UTI . However, at home on 1/11 she had what was described by the family as "seizure activity" and brought to the ER for evaluatin. She was found with a leukocytosis, started on Vanc/Aztreonam / Bactrim/Acyclovir and descaltated to Acyclobir and Moxyfloxain. She had had been persistently hypotensive last night per review of the flow sheet, and had episode of afib with RVR ( HR ~ 120s - 130s )     PTA Medications   Medication Sig    aspirin (ECOTRIN) 81 MG EC tablet Take 81 mg by mouth once daily.    levothyroxine (SYNTHROID) 75 MCG tablet Take 1 tablet (75 mcg total) by mouth once daily.    pravastatin (PRAVACHOL) 20 MG tablet Take 1 tablet (20 mg total) by mouth once daily.        Review of patient's allergies indicates:   Allergen Reactions    Codeine     Penicillins        Past Medical History   Diagnosis Date    Cancer     Diabetes mellitus type II     Hypertension     Thyroid disease        Past Surgical History   Procedure Laterality Date    Hysterectomy      Breast surgery      Shoulder surgery       left    Cholecystectomy         History reviewed. No pertinent family history.     Social History     Social History    Marital status:      Spouse name: N/A    Number of children: N/A    Years of education: N/A     Social History Main Topics    Smoking status: Former Smoker    Smokeless tobacco: Never Used    Alcohol use No    Drug use: None    Sexual activity: Not Asked     Other Topics Concern    None     Social History Narrative       Review of Systems:  Unable to obtain         OBJECTIVE:     Temp:  [97 °F (36.1 °C)-99.2 °F (37.3 " °C)] 97 °F (36.1 °C)  Pulse:  [79-92] 84  Resp:  [17-18] 18  SpO2:  [92 %-94 %] 94 %  BP: ()/(42-62) 91/52  Body mass index is 19.02 kg/(m^2).     Physical Exam:  General appearance: ill appearing   HEENT:  Normocephalic, atruamatic, conjunctivae normal, sclarea anictric, PERRL, Mucus membranes moist,  Trachea midline , no JVD   Lungs: global expiratory wheezes, ronhi   Heart: Regular rate and rhythm, S1, S2 normal, no murmur, click, rub or gallop  Abdomen: Soft, non-tender, non-distended; bowel sounds normal; no masses, no organomegaly  Extremities: No cyanosis or clubbing. No edema  Pulses: 2+ and symmetric  Skin: left arm redness to deltoid muscle. Fairly well demercated. Lower extremity erythematous lesions        Laboratory: Reviewed and noted  where applicable- Please see chart for full lab data.      Diagnostic Tests:  EKG:   Afib with RVR , PVC, nonspecific ST changes       CXR: Heart is mildly enlarged.  Calcification and tortuosity of thoracic aorta.  There is increase in the interstitial markings bilaterally.   increased markings at the left base unchanged.    CT Chest     1.  Extensive opacity in the left mainstem bronchus and left upper and left lower lobe bronchi favored to reflect mucous plugging although endobronchial lesions are not excluded.  2.  Post obstructive atelectasis versus consolidation in the basal segments of the left lower lobe.  This could reflect aspiration pneumonia.  3.  Bilateral pleural fluid.  4.  Severe osteopenia.  5.  Severe atherosclerotic calcification of the aorta and its branches.    Echo prelim : Normal systolic and diastolic function . Intermediate filling presures . No valvulopahty      ASSESSMENT/PLAN:     Assessment Ms. Sue Capone is a 89 y.o. with a past medical history of DMII , HTN , hypothyroidism , R breast cancer ( s/p radiaiton , ? Chemo ) here initially with seizures, ? Infection and afib       Plan     1. Paroxysmal Atrial Fibrilation     No  underlying myopathy . Would consider starting low dose CCB ( would avoid bb in this wheezing patient) once medically stable. If RVR may benefit from caridzem , or digoxin if hypotensive   Paroxysms likely driven by underlying illness. Would aggressively supplement K --> 4 , Mg--> 2, avoid acidosis       [id number]: ~4 , however patient has falls , and is likely a poor anticoagulation candidate acutely given illness, and likely poor candidate for long term AC. Will however defer to primary               Adebayo Moffett D.O  [facility] Fellow

## 2017-01-18 NOTE — PROGRESS NOTES
Progress Note  Nephrology      Consult Requested By: Kory Dave MD      SUBJECTIVE:     Overnight events  Patient is a 89 y.o. female     Patient Active Problem List   Diagnosis    Dementia without behavioral disturbance    History of breast cancer    Pre-diabetes    Hypothyroidism    Dyslipidemia    Seizure    Seizures    Hyponatremia    Generalized convulsive epilepsy with intractable epilepsy    CAP (community acquired pneumonia)    Aspiration pneumonia of left lower lobe     Past Medical History   Diagnosis Date    Cancer     Diabetes mellitus type II     Hypertension     Thyroid disease               OBJECTIVE:     Vitals:    01/18/17 0500 01/18/17 0520 01/18/17 0800 01/18/17 0830   BP: (!) 78/42 (!) 73/50 (!) 91/52    BP Location:       Patient Position:  Lying Lying    BP Method: Manual Automatic Automatic    Pulse:  92 84 84   Resp:  18 18 18   Temp:  98.6 °F (37 °C) 97 °F (36.1 °C)    TempSrc:  Oral Axillary    SpO2:   (!) 92% (!) 94%   Weight:       Height:           Temp: 97 °F (36.1 °C) (01/18/17 0800)  Pulse: 84 (01/18/17 0830)  Resp: 18 (01/18/17 0830)  BP: (!) 91/52 (01/18/17 0800)  SpO2: (!) 94 % (01/18/17 0830)               Medications:   aspirin  81 mg Oral Daily    citric acid-sodium citrate 500-334 mg/5 ml  30 mL Oral BID    docusate sodium  100 mg Oral BID    enoxaparin  40 mg Subcutaneous Daily    guaifenesin 100 mg/5 ml  200 mg Oral Q4H    lacosamide (VIMPAT) IVPB  50 mg Intravenous Q12H    levothyroxine  75 mcg Oral Daily    metronidazole  500 mg Intravenous Q8H    moxifloxacin  400 mg Intravenous Q24H    polyethylene glycol  17 g Oral Daily    pravastatin  20 mg Oral Daily    senna  8.6 mg Oral Daily    sodium chloride 0.9%  3 mL Intravenous Q8H                  Physical Exam:  General appearance: NAD  No SOB  Lungs: diminished breath sounds  Heart: Pulse 84  Abdomen: soft  Extremities: edema  Skin: dry  Laboratory:  ABG  Labs reviewed  Recent Results (from  the past 336 hour(s))   Basic metabolic panel    Collection Time: 01/18/17  6:36 AM   Result Value Ref Range    Sodium 132 (L) 136 - 145 mmol/L    Potassium 4.1 3.5 - 5.1 mmol/L    Chloride 106 95 - 110 mmol/L    CO2 17 (L) 23 - 29 mmol/L    BUN, Bld 15 10 - 30 mg/dL    Creatinine 0.8 0.5 - 1.4 mg/dL    Calcium 7.4 (L) 8.7 - 10.5 mg/dL    Anion Gap 9 8 - 16 mmol/L   Basic metabolic panel    Collection Time: 01/17/17 12:05 PM   Result Value Ref Range    Sodium 130 (L) 136 - 145 mmol/L    Potassium 3.8 3.5 - 5.1 mmol/L    Chloride 104 95 - 110 mmol/L    CO2 18 (L) 23 - 29 mmol/L    BUN, Bld 13 10 - 30 mg/dL    Creatinine 0.8 0.5 - 1.4 mg/dL    Calcium 7.9 (L) 8.7 - 10.5 mg/dL    Anion Gap 8 8 - 16 mmol/L   Basic Metabolic Panel (BMP)    Collection Time: 01/17/17  4:52 AM   Result Value Ref Range    Sodium 130 (L) 136 - 145 mmol/L    Potassium 3.9 3.5 - 5.1 mmol/L    Chloride 104 95 - 110 mmol/L    CO2 17 (L) 23 - 29 mmol/L    BUN, Bld 14 10 - 30 mg/dL    Creatinine 0.8 0.5 - 1.4 mg/dL    Calcium 7.8 (L) 8.7 - 10.5 mg/dL    Anion Gap 9 8 - 16 mmol/L     Recent Results (from the past 336 hour(s))   CBC auto differential    Collection Time: 01/18/17  7:42 AM   Result Value Ref Range    WBC 14.44 (H) 3.90 - 12.70 K/uL    Hemoglobin 8.4 (L) 12.0 - 16.0 g/dL    Hematocrit 25.2 (L) 37.0 - 48.5 %    Platelets 308 150 - 350 K/uL   CBC auto differential    Collection Time: 01/17/17  4:52 AM   Result Value Ref Range    WBC 21.66 (H) 3.90 - 12.70 K/uL    Hemoglobin 9.5 (L) 12.0 - 16.0 g/dL    Hematocrit 28.6 (L) 37.0 - 48.5 %    Platelets 344 150 - 350 K/uL   CBC auto differential    Collection Time: 01/16/17  5:09 AM   Result Value Ref Range    WBC 14.82 (H) 3.90 - 12.70 K/uL    Hemoglobin 10.2 (L) 12.0 - 16.0 g/dL    Hematocrit 31.0 (L) 37.0 - 48.5 %    Platelets 358 (H) 150 - 350 K/uL     Urinalysis  No results for input(s): COLORU, CLARITYU, SPECGRAV, PHUR, PROTEINUA, GLUCOSEU, BILIRUBINCON, BLOODU, WBCU, RBCU, BACTERIA,  MUCUS, NITRITE, LEUKOCYTESUR, UROBILINOGEN, HYALINECASTS in the last 24 hours.    Diagnostic Results:  X-Ray: Reviewed  US: Reviewed  Echo: Reviewed  ACCESS    ASSESSMENT/PLAN:   CKD 3.  Hyponatremia.  Metabolic acidosis.  On bicitra.  Anemia.  Iron study.  BP 91/52.  I and O.

## 2017-01-18 NOTE — PLAN OF CARE
BP's have been running low all night. I checked manual BP this AM at 7:00am and BP was 86/56.    Patient is currently receiving IVF bolus. Patient is awake and responsive to command, she is able to communicate and ask questions. Will continue to closely monitor BP and mental status changes.    Ming Bai MD  [facility] Medicine [facility]I    Pager Contact:  Team B: [phone]

## 2017-01-18 NOTE — PROGRESS NOTES
"[facility] Internal Medicine Resident [facility] Progress Note    Subjective:      Patient awake this AM, able to communicate that she feels well.     Objective:   Last 24 Hour Vital Signs:  BP  Min: 73/50  Max: 117/73  Temp  Av.9 °F (37.2 °C)  Min: 98.1 °F (36.7 °C)  Max: 99.9 °F (37.7 °C)  Pulse  Av.9  Min: 79  Max: 92  Resp  Av.9  Min: 17  Max: 18  SpO2  Av %  Min: 92 %  Max: 94 %  I/O last 3 completed shifts:  In: 220 [P.O.:120; IV Piggyback:100]  Out: 366 [Urine:366]    Physical Examination:    General:   Sleeping in bed, in no apparent distress  Head:    Normocephalic and atraumatic  Eyes:    PERRL; EOMi with anicteric sclera and clear conjunctivae  Mouth:   Gurgling oral secretions while sleeping; dry mucous membranes  Cardio:   Irregularly irregular rhythm, with normal S1 and S2; no murmurs or rubs  Resp:    CTAB; respirations unlabored; no wheezes, crackles or rhonchi  Abdom:   Soft, NTND with normoactive bowel sounds  Extrem:   Warm and well-perfused with no clubbing, cyanosis or edema  Pulses:   2+ and symmetric distally  Neuro:   Awoken to light touch, Response appropriately to "goodmorning", recognizes family members at bedside by name, moves all extremities spontaneously.    Laboratory:  Laboratory Data  Pertinent Findings:  Recent Labs      17   0509  17   0452   WBC  14.82*  21.66*   HGB  10.2*  9.5*   HCT  31.0*  28.6*   PLT  358*  344   MCV  83  83   RDW  14.5  14.9*   GRAN  79.1*  11.7*  86.0*   LYMPH  10.7*  1.6  4.0*  CANCELED   MONO  8.8  1.3*  4.0  CANCELED   EOS  0.1  CANCELED   BASO  0.07  CANCELED   EOSINOPHIL  0.6  1.0   BASOPHIL  0.5  0.0       Recent Labs      17   1151  17   1750  17   2348  17   0452  17   1205   NA  131*  130*  130*  130*  130*   K  4.2  4.1  3.9  3.9  3.8   CL  100  101  102  104  104   CO2  14*  15*  19*  17*  18*   BUN  15  16  16  14  13   CREATININE  0.8  0.9  0.9  0.8  0.8     No results for input(s): " POCTGLUCOSE in the last 72 hours.  No results for input(s): PROT, ALBUMIN, BILITOT, AST, ALT, ALKPHOS in the last 72 hours.    Microbiology Data  Pertinent Findings:  No new data    Other Results:  EKG (my interpretation): 1/18/17 EKG: AFib w/     Radiology Data   Pertinent Findings (my interpretation):  No new imaging    Current Medications:     Infusions:        Scheduled:   custom IVPB builder   Intravenous Q8H    aspirin  81 mg Oral Daily    citric acid-sodium citrate 500-334 mg/5 ml  30 mL Oral BID    docusate sodium  100 mg Oral BID    enoxaparin  40 mg Subcutaneous Daily    guaifenesin 100 mg/5 ml  200 mg Oral Q4H    lacosamide (VIMPAT) IVPB  50 mg Intravenous Q12H    levothyroxine  75 mcg Oral Daily    metronidazole  500 mg Intravenous Q8H    moxifloxacin  400 mg Intravenous Q24H    polyethylene glycol  17 g Oral Daily    pravastatin  20 mg Oral Daily    senna  8.6 mg Oral Daily    sodium chloride 0.9%  3 mL Intravenous Q8H    vancomycin (VANCOCIN) IVPB  15 mg/kg Intravenous Q24H        PRN:  dextrose 50%, dextrose 50%, glucagon (human recombinant), glucose, glucose    Antibiotics and Day Number of Therapy:  Moxifloxacin 400mg IV q24hr Day 5  Flagyl 500mg q8hr Day 2    Lines and Day Number of Therapy:  PIV x1    Assessment:     Sue Capone is a 89 y.o.female with  Patient Active Problem List    Diagnosis Date Noted    CAP (community acquired pneumonia)     Aspiration pneumonia of left lower lobe     Hyponatremia 01/16/2017    Generalized convulsive epilepsy with intractable epilepsy     Seizure 01/11/2017    Seizures 01/11/2017    Dementia without behavioral disturbance 09/01/2016    History of breast cancer 09/01/2016    Pre-diabetes 09/01/2016    Hypothyroidism 09/01/2016    Dyslipidemia 09/01/2016        Plan:     89 yo F w/ medical hx of Pneumonia and UTI discharged 1 day ago from ED on Levofloxacin, hypothyroidism, HLD, Breast CA w/ resection and radiation in 1995 with  complaint of seizure x1 day presents with      Generalized Tonic Clonic Seizures  - DDx includes meningitis/encephalitis vs hyponatremia  - Tonic clonic seizures x2 witnessed, no loss bowel/bladder, first self resolved, seizure in ED resolved after Lorazepam 1mg IV  - AMS, fever and chills x2 days. Sent home from ED on 1/10/17 with Levofloxacin 750mg PO for pneumonia (CURB 65 =1)  - Initial vitals, afebrile 98.2F, HR 90, /74, SaO2 95% RA  - At admission, WBC 15.2, afebrile. No nuchal rigidity.   - 1/12/17 Blood cx x2: no growth to date  - 1/11/17 LP results: Colorless, WBC 6, , Diff: 42% Neut, 53% Lymph, Glu 69, Prot 53  - 1/11/17 MRI Brain: Motion limited examination, no intracranial mass lesion or acute pathology, mild chronic ischemic changes  - 1/12/17 EEG: static encephalopathy without focal changes nor an epileptic process   - 1/11/17 HSV CSF PCR: Negative  - Consult Neurology (Christian): MRI neg, consider MRI w/ epilespy protocol. EEG encephalopathic, but no epileptiform activity noted. Continue Vimpat  - As per ID ([place]): Patient currelty on Acyclovir IV for meningitis/encephalitis, will continue due to elevated RBC in CSF.  - Decreased dose of Vimpat to 50mg IV q12 due to potentially sedating effects     Paroxysmal Atrial Fibrilation  - New onset as of 1/18/17 EKG.  - Currently, rate is   - CHADSVASC = 3  - Will consider Cardiology consult today    Hypotension  - Likely secondary to poor PO intake vs AFib. Sepsis considered, however patient afebrile, mentation improved from yesterday, sepsis unlikely.   - Overnight, patient has been having low BP's, manually taken BP by physician this AM was BP 86/56  - Patient given NS 500mL Bolus  - Currently, patient mentation improved from previous day. Is responding to questions appropriately and is more conversational than previous day  - Will continue to monitor and continue IVF resusitation    Hypovolemic Hyponatremia  - 1/13/17 Na 122, Urine Osm  500, Urine Na 72.  - Urine labs indicate possible SIADH vs RTA4. Ruled out hypothyroid (TSH/T4 wnl), Dougherty's (AM cortisol wnl)  - 1/15/17 CT Chest: L main bronchus, LLL & ELIAS mucus plug vs endobronchial lesion. Post obstructive atelectasis vs consolidation.  - As per nephrology recommendations, continue free water restrict, PO salt tabs, Lasix IV. Monitor q6hr BMP. No indication for 3% saline at this time. If no clinical improvement, consider Vaptan  - Continue to monitor BMP, titrate salt tabs, fluid restrict, continue lasix pushes as per nephrology, consider administering Vaptan in ICU if sodium decreases.  - Currently, Na 130. Continuing to remain stable at Na 130 with fluid restriction and salt tabs.      Community Acquired Pneumonia  - 1/9/17 CXR: RLL consolidation  - At admission on exam, Lungs CTAB  - Negative Procalcitonin <0.09  - Discontinued Vancomycin / Moxifloxacin (de-escalated from Vanc/Aztreonam/Bactrim IV)  - As per ID ([place]) recommendations: continue Moxifloxacin / Flagyl for now.    Non-Anion Gap Metabolic Acidosis  - 1/14/17 HCO3- 15, HCO3- decreasing since admission  - Possibly RTA, consider urine studies  - Continue to monitor BMP      Normocytic Anemia  - At admission, H/H 11.2/33.5 MCV 83  - 1/11/17 Iron 44, Ferritin 115. B12 and Folate wnl  - Consider treatment with PO iron supplementation after acute illness.      Constipation  - As per family, no BM in 5 days  - In ED, patient disimpacted  - Continue miralax PRN, colace. Consider lactulose supp. May add/titrate meds as needed  - BM reported 1/12  - Continue colace, senna, miralax prn      Hypothyroidism  - 1/11/17 TSH: 2.219, FT4 1.18  - Continue home synthroid      HLD  - 1/3/17 Lipid Panel: TChol 153, LDL 71  - Continue home statin      DVT PPx: Lovenox  Diet: Pureed  Code: DNR  Social: Lives at home. PT/OT: family refuses HH PT/OT. DME hospital bed      Disposition: Pending clinical improvement and workup of hyponatremia    Ming  Bhumika  [facility] Internal [facility]-I  [facility] Internal Medicine Service Team B    [facility] Medicine Hospitalist Pager numbers:   [facility] Hospitalist Medicine Team A (Benita/Elpidio): [phone]  [facility] Hospitalist Medicine Team B (Jolie/Jorge):  [phone]

## 2017-01-18 NOTE — PT/OT/SLP PROGRESS
Physical Therapy  Treatment    Sue Capone   MRN: [id number]   Admitting Diagnosis: Hyponatremia    PT Received On: 01/18/17  PT Start Time: 0802     PT Stop Time: 0826    PT Total Time (min): 24 min       Billable Minutes:  Therapeutic Activity 24    Treatment Type: Treatment  PT/PTA: PTA     PTA Visit Number: 3       General Precautions: Standard, aspiration, fall, hearing impaired  Orthopedic Precautions: N/A   Braces:      Do you have any cultural, spiritual, Yazidism conflicts, given your current situation?: No    Subjective:  Communicated with nsg prior to session.       Pain Rating:  (no indication of pain)                   Objective:   Patient found with: peripheral IV, restraints, telemetry    Functional Mobility:  Bed Mobility:        Transfers:       Gait:        Stairs:  Therapeutic Activity 24    Balance:   Static Sit:   Dynamic Sit:   Static Stand:   Dynamic stand:      Therapeutic Activities and Exercises: ue/le PROM in available planes, ue restraints doffed for ROM marci bo MD present checking vitals and speaking with family      AM-[facility] 6 CLICK MOBILITY  How much help from another person does this patient currently need?   1 = Unable, Total/Dependent Assistance  2 = A lot, Maximum/Moderate Assistance  3 = A little, Minimum/Contact Guard/Supervision  4 = None, Modified [place]/Independent    Turning over in bed (including adjusting bedclothes, sheets and blankets)?: 2  Sitting down on and standing up from a chair with arms (e.g., wheelchair, bedside commode, etc.): 2  Moving from lying on back to sitting on the side of the bed?: 2  Moving to and from a bed to a chair (including a wheelchair)?: 2  Need to walk in hospital room?: 1  Climbing 3-5 steps with a railing?: 1  Total Score: 10    AM-PAC Raw Score CMS G-Code Modifier Level of Impairment Assistance   6 % Total / Unable   7 - 9 CM 80 - 100% Maximal Assist   10 - 14 CL 60 - 80% Moderate Assist   15 - 19 CK 40 - 60% Moderate  Assist   20 - 22 CJ 20 - 40% Minimal Assist   23 CI 1-20% SBA / CGA   24 CH 0% Independent/ Mod I     Patient left supine with all lines intact, call button in reach and MD and family present.    Assessment: pt alert for minimal time then back asleep  Sue Capone is a 89 y.o. female with a medical diagnosis of Hyponatremia and presents with .    Rehab identified problem list/impairments: Rehab identified problem list/impairments: weakness, impaired endurance, impaired functional mobilty, impaired cognition, decreased upper extremity function, decreased lower extremity function, impaired skin    Rehab potential is fair.    Activity tolerance: Poor    Discharge recommendations: Discharge Facility/Level Of Care Needs: home health PT (home with son pending progress)     Barriers to discharge: Barriers to Discharge: None    Equipment recommendations: Equipment Needed After Discharge: hospital bed     GOALS: see general POC  Physical Therapy Goals        Problem: Physical Therapy Goal    Goal Priority Disciplines Outcome Goal Variances Interventions   Physical Therapy Goal     PT/OT, PT Ongoing (interventions implemented as appropriate)     Description:  Goals to be met by: 17     Patient will increase functional independence with mobility by performin. Supine to sit with Moderate Assistance  2. Sit to supine with Moderate Assistance  3. Bed to chair transfer with Moderate Assistance   4. Sitting at edge of bed x15 minutes with Minimal Assistance  5. Lower extremity exercise program x 10-15 reps per handout, with assistance as needed    Recommendations: Recommend d/c home with / supervision/assistance for safety. Recommended DME includes hospital bed. Pt's family declined  PT/OT.                 PLAN:    Patient to be seen 3 x/week  to address the above listed problems via therapeutic activities, therapeutic exercises, neuromuscular re-education  Plan of Care expires: 17  Plan of Care reviewed  with: patient, son         Dipesh Phillips, PTA  01/18/2017

## 2017-01-18 NOTE — PROGRESS NOTES
Infectious Disease Follow Up Note      Assessment:  CAP/Possible Aspiration PNA -  Episodes of hypotension overnight, but afebrile. Primary added Vancomycin. Patient not oriented this AM, still not much clinical improvement. WBC decreased form 21 to 14 today, afebrile. Pending RPR/Crypto Ag/repeat BCx. Per primary team, want to continue acyclovir for 7 days. Was on Moxi/Flagyl now with Vanc added overnight.     Generalized Tonic Clonic Seizures -  On vimpat, neuro on board.     Hyponatremia/Non AGMA/Hypothyrodism/HLD - management per primary team     Plan and Recommendations:  1. Continue current antibiotics for now, Moxifloxacin/Flagyl. Agree with primary to continue Vanc given episodes of hypotension overnight. WBC trended down. Re-assess patient tomorrow with current regimen since WBC trended down. Consider graded challenge for switch to meropenem if patient does not clinically improve (uncertain history of penicillins allergy, family reports it has been there for years).  2. Awaiting to obtain respiratory culture  3. Pending Cryptococcal Ag/RPR.      Will discuss case with staff, Dr. Slade    Thank you for the consult, please call with questions.    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          Subjective and Interval History:    Per daughter, patient slept all night and still with  "congestion and "somehting in lungs but cannot cough up." Patient more confused this AM, following commands but thinks she is at "[facility]."    HPI:  History was given by son, as patient is not fully oriented. Per son, at baseline, patient was more conversational, although she depends on son and his wife for ADLs and ambulate with assistance. Since Sat 1/7, patient appeared more confused to son, not responding to questions/prompts as normally would, which prompted visit to urgent care center on 1/9 where patient was told to have "UTI and CXR done," and prescribed Levaquin 250mg daily. On 1/10, patient visited ED with CXR results showed pulm edema vs PNA. Patient discharged with increased Levaquin to 750mg daily. On date of admit 1/11, son reported patient had symptoms of seizures, shaking and drooling for less than a minute, and resolved, no urinary/bowell incontinence. Patient was brought to ED and had another episode of seizure which resolved with ativan.      Hospital Course:  Patient presented afebrile, with WBC 15.7, which slightly improved but increased to 21.66 on 1/17, procal negative  CXR 1/9 with RLL consolidation, CXR 1/11 with increased interstitial markings b/l and increased markings at left base unchanged  LP 1/11 PCR HSV negative, gram negative, BCx 1/11 NG  PCR CSF 1/11 no organism, rare WBC  CSF cell count: 6 WBC, 221 RBC, 42% neut, 53% lymphs  CT Chest 1/15 with extensive opacity in the left mainstem bronchus and left upper and left lower lobe bronchi favored to reflect mucous plugging although endobronchial lesions are not excluded, Post obstructive atelectasis vs consolidation in the basal segments of the left lower lobe. This could reflect aspiration pneumonia.   BCx 1/17 NGTD  RPR 1/17 pending   Cryptococcal Ag 1/17 pending    Initially on Vanc/Aztreonam/Bactrim/Acyclovir then desescalated to Acyclovir/Moxi. Added Flagyl 1/17, primary team added Vanc 1/18 overnight due to hypotension.      " "   Medications:  Antibiotics:   Antibiotics     Start     Stop Route Frequency Ordered    01/16/17 1145  moxifloxacin 400 mg/250 mL IVPB 400 mg      -- IV Every 24 hours (non-standard times) 01/16/17 1039    01/17/17 1715  metronidazole IVPB 500 mg      -- IV Every 8 hours (non-standard times) 01/17/17 1611    01/18/17 0900  vancomycin 750 mg in dextrose 5 % 250 mL IVPB (ready to mix system)  (Vancomycin IVPB with levels panel)      -- IV Every 24 hours (non-standard times) 01/18/17 0649          Physical Exam:  Vitals:    01/18/17 0830   BP:    Pulse: 84   Resp: 18   Temp:       Temp:  [97 °F (36.1 °C)-99.2 °F (37.3 °C)]     General: resting in bed with daughter at bedside, confused but follows commands   HEENT: Normocephalic. Atraumatic. PERRL. EOMI, no scleral icterus. No sinus tenderness. Moist oral mucosa.  Neck: supple, no cervical or supraclavicular lymphadenopathy  Pulmonary: diffused upper airway noises  Cardiac: Regular rhythm. Normal S1 & S2. No audible murmurs or gallops. No JVD.   Abdominal: s, NT, ND, normoactive bowel sounds   Extremities: UE anasarca, pulses 2+ throughout.   Skin: Stage I/II sacral wound, no purulence discharge, diffused LE ecchymosis     Neurological: no oriented this AM, thinks she is at "[facility]," follows commands    Lines:  PIV    Labs:  CBC:   Lab Results   Component Value Date    WBC 14.44 (H) 01/18/2017    WBC 21.66 (H) 01/17/2017    WBC 14.82 (H) 01/16/2017    WBC 10.65 01/15/2017    WBC 13.78 (H) 01/14/2017    HCT 25.2 (L) 01/18/2017     01/18/2017        BMP:   Recent Labs  Lab 01/18/17  0636   *   *   K 4.1      CO2 17*   BUN 15   CREATININE 0.8   CALCIUM 7.4*       LFT: Lab Results   Component Value Date    ALT 13 01/11/2017    AST 28 01/11/2017    ALKPHOS 59 01/11/2017    BILITOT 0.6 01/11/2017         Microbiology x 7d:   Microbiology Results (last 7 days)     Procedure Component Value Units Date/Time    Cryptococcal antigen [[id number]] " Collected:  01/18/17 0636    Order Status:  Sent Specimen:  Blood from Blood Updated:  01/18/17 0636    Blood culture [[id number]] Collected:  01/17/17 1656    Order Status:  Completed Specimen:  Blood Updated:  01/18/17 0345     Blood Culture, Routine No Growth to date    Blood culture [[id number]] Collected:  01/17/17 1656    Order Status:  Completed Specimen:  Blood Updated:  01/18/17 0345     Blood Culture, Routine No Growth to date    Culture, Respiratory with Gram Stain [[id number]]     Order Status:  No result Specimen:  Respiratory     CSF culture and Gram Stain (Tube 2) [[id number]] Collected:  01/11/17 2110    Order Status:  Completed Specimen:  CSF (Spinal Fluid) from CSF Tap, Tube 2 Updated:  01/17/17 0711     CSF CULTURE No Growth     Gram Stain Result Rare WBC's      No organisms seen    Narrative:       On which sequentially labeled tube should this analysis be  performed?->2    Blood culture (site 2) [[id number]] Collected:  01/11/17 2124    Order Status:  Completed Specimen:  Blood from Peripheral, Wrist, Right Updated:  01/17/17 0612     Blood Culture, Routine No growth after 5 days.    Narrative:       Site # 2, aerobic only    Blood culture (site 1) [[id number]] Collected:  01/11/17 2116    Order Status:  Completed Specimen:  Blood from Peripheral, Hand, Right Updated:  01/17/17 0612     Blood Culture, Routine No growth after 5 days.    Narrative:       Site # 1, aerobic and anaerobic (central line, if patient has  one)    CSF culture [[id number]] Collected:  01/11/17 2103    Order Status:  Completed Specimen:  CSF (Spinal Fluid) from CSF Tap, Tube 1 Updated:  01/12/17 0335    Narrative:       Culture CSF was cancelled on 01/12/2017 at 03:35 by CVG; duplicate   order see order #[id number] 01/12/2017  03:35            Imaging:  Reviewed        Mohit Falcon MD  [facility] PGY-1

## 2017-01-18 NOTE — PLAN OF CARE
Re-evaluated need for soft restraints for Ms. Capone. Per nursing, pt continuing to pull at IV access, and unable to be redirected while doing so. Will re-order restraints. Primary team to assess need in AM.     Upon review of vitals at the time, noted pt with systolic pressure in the 80s. mentating at baseline per family. Despite multiple repeats of BP with varying size BP cuffs, BP noted to be 80s over 40-60s. Although HR maintained 80- 90s. Had nursing repeat manual. 88/64. Noted trend of decreasing pressures since the afternoon. Pt has been fluid restricted with treatment for hyponatremia, with poor PO intake per family. Na at 130 yesterday. Pt on no antihypertensives. In light of increasing white count with concern for infection, decreased Po intake over the past 3 days. Will give 500cc over 2 hours, and re-evaluate. Reduced size bolus with goal to minimize Na change as concern for SIADH on differential.  AM labs to follow. Low threshold to step up to ICU. vanc started    ALISE Nelson  [facility]

## 2017-01-18 NOTE — PROGRESS NOTES
Pharmacy Pharmacokinetics Monitoring Protocol    Indication: Pneumonia  Target Trough: 15-20mcg/ml    Based on her pharmacokinetics, will start vancomycin 750mg IV q24h with trough prior to the 3rd dose ( 1/20 @ 0800). Pharmacy will continue to follow.

## 2017-01-18 NOTE — PLAN OF CARE
Pt hypotensive. Blood pressure 88/62. Dr. Stephens at bedside. 500cc bolus of NS ordered. Will administer and re assess blood pressure manually.

## 2017-01-19 LAB
ANION GAP SERPL CALC-SCNC: 6 MMOL/L
BASOPHILS # BLD AUTO: 0.09 K/UL
BASOPHILS NFR BLD: 0.8 %
BUN SERPL-MCNC: 15 MG/DL
CALCIUM SERPL-MCNC: 7.7 MG/DL
CHLORIDE SERPL-SCNC: 109 MMOL/L
CO2 SERPL-SCNC: 21 MMOL/L
CREAT SERPL-MCNC: 0.8 MG/DL
CRYPTOC AG SER QL LA: NEGATIVE
DIFFERENTIAL METHOD: ABNORMAL
EOSINOPHIL # BLD AUTO: 0.2 K/UL
EOSINOPHIL NFR BLD: 1.8 %
ERYTHROCYTE [DISTWIDTH] IN BLOOD BY AUTOMATED COUNT: 15.6 %
EST. GFR  (AFRICAN AMERICAN): >60 ML/MIN/1.73 M^2
EST. GFR  (NON AFRICAN AMERICAN): >60 ML/MIN/1.73 M^2
GLUCOSE SERPL-MCNC: 114 MG/DL
HCT VFR BLD AUTO: 24.8 %
HGB BLD-MCNC: 8.2 G/DL
LYMPHOCYTES # BLD AUTO: 1.8 K/UL
LYMPHOCYTES NFR BLD: 15.3 %
MCH RBC QN AUTO: 27.7 PG
MCHC RBC AUTO-ENTMCNC: 33.1 %
MCV RBC AUTO: 84 FL
MONOCYTES # BLD AUTO: 1.4 K/UL
MONOCYTES NFR BLD: 11.7 %
NEUTROPHILS # BLD AUTO: 8.3 K/UL
NEUTROPHILS NFR BLD: 70.4 %
PLATELET # BLD AUTO: 342 K/UL
PMV BLD AUTO: 10 FL
POCT GLUCOSE: 184 MG/DL (ref 70–110)
POCT GLUCOSE: 235 MG/DL (ref 70–110)
POTASSIUM SERPL-SCNC: 3.9 MMOL/L
RBC # BLD AUTO: 2.96 M/UL
SODIUM SERPL-SCNC: 136 MMOL/L
WBC # BLD AUTO: 11.96 K/UL

## 2017-01-19 PROCEDURE — 36415 COLL VENOUS BLD VENIPUNCTURE: CPT

## 2017-01-19 PROCEDURE — 63600175 PHARM REV CODE 636 W HCPCS: Performed by: INTERNAL MEDICINE

## 2017-01-19 PROCEDURE — 85025 COMPLETE CBC W/AUTO DIFF WBC: CPT

## 2017-01-19 PROCEDURE — 25000003 PHARM REV CODE 250: Performed by: STUDENT IN AN ORGANIZED HEALTH CARE EDUCATION/TRAINING PROGRAM

## 2017-01-19 PROCEDURE — 25000003 PHARM REV CODE 250: Performed by: INTERNAL MEDICINE

## 2017-01-19 PROCEDURE — C9254 INJECTION, LACOSAMIDE: HCPCS | Performed by: INTERNAL MEDICINE

## 2017-01-19 PROCEDURE — 27000221 HC OXYGEN, UP TO 24 HOURS

## 2017-01-19 PROCEDURE — 93005 ELECTROCARDIOGRAM TRACING: CPT

## 2017-01-19 PROCEDURE — 80048 BASIC METABOLIC PNL TOTAL CA: CPT

## 2017-01-19 PROCEDURE — 94668 MNPJ CHEST WALL SBSQ: CPT

## 2017-01-19 PROCEDURE — 94761 N-INVAS EAR/PLS OXIMETRY MLT: CPT

## 2017-01-19 PROCEDURE — 11000001 HC ACUTE MED/SURG PRIVATE ROOM

## 2017-01-19 PROCEDURE — 87449 NOS EACH ORGANISM AG IA: CPT

## 2017-01-19 PROCEDURE — 93010 ELECTROCARDIOGRAM REPORT: CPT | Mod: ,,, | Performed by: INTERNAL MEDICINE

## 2017-01-19 RX ORDER — MODAFINIL 100 MG/1
100 TABLET ORAL DAILY
Status: DISCONTINUED | OUTPATIENT
Start: 2017-01-19 | End: 2017-01-19

## 2017-01-19 RX ADMIN — SODIUM CHLORIDE, PRESERVATIVE FREE 3 ML: 5 INJECTION INTRAVENOUS at 10:01

## 2017-01-19 RX ADMIN — SODIUM CHLORIDE 50 MG: 9 INJECTION, SOLUTION INTRAVENOUS at 06:01

## 2017-01-19 RX ADMIN — SENNOSIDES 8.6 MG: 8.6 TABLET ORAL at 09:01

## 2017-01-19 RX ADMIN — GUAIFENESIN 200 MG: 100 SOLUTION ORAL at 06:01

## 2017-01-19 RX ADMIN — PRAVASTATIN SODIUM 20 MG: 20 TABLET ORAL at 09:01

## 2017-01-19 RX ADMIN — GUAIFENESIN 200 MG: 100 SOLUTION ORAL at 02:01

## 2017-01-19 RX ADMIN — LEVOTHYROXINE SODIUM 75 MCG: 75 TABLET ORAL at 09:01

## 2017-01-19 RX ADMIN — SODIUM CHLORIDE, PRESERVATIVE FREE 3 ML: 5 INJECTION INTRAVENOUS at 06:01

## 2017-01-19 RX ADMIN — SODIUM CHLORIDE, PRESERVATIVE FREE 3 ML: 5 INJECTION INTRAVENOUS at 02:01

## 2017-01-19 RX ADMIN — SODIUM CITRATE AND CITRIC ACID MONOHYDRATE 30 ML: 500; 334 SOLUTION ORAL at 02:01

## 2017-01-19 RX ADMIN — ENOXAPARIN SODIUM 40 MG: 100 INJECTION SUBCUTANEOUS at 11:01

## 2017-01-19 RX ADMIN — GUAIFENESIN 200 MG: 100 SOLUTION ORAL at 05:01

## 2017-01-19 RX ADMIN — SODIUM CITRATE AND CITRIC ACID MONOHYDRATE 30 ML: 500; 334 SOLUTION ORAL at 09:01

## 2017-01-19 RX ADMIN — SODIUM CHLORIDE 50 MG: 9 INJECTION, SOLUTION INTRAVENOUS at 05:01

## 2017-01-19 RX ADMIN — MODAFINIL 100 MG: 100 TABLET ORAL at 10:01

## 2017-01-19 RX ADMIN — DOCUSATE SODIUM 100 MG: 100 CAPSULE, LIQUID FILLED ORAL at 10:01

## 2017-01-19 RX ADMIN — METRONIDAZOLE 500 MG: 500 INJECTION, SOLUTION INTRAVENOUS at 12:01

## 2017-01-19 RX ADMIN — METRONIDAZOLE 500 MG: 500 INJECTION, SOLUTION INTRAVENOUS at 09:01

## 2017-01-19 RX ADMIN — GUAIFENESIN 200 MG: 100 SOLUTION ORAL at 09:01

## 2017-01-19 RX ADMIN — POLYETHYLENE GLYCOL 3350 17 G: 17 POWDER, FOR SOLUTION ORAL at 09:01

## 2017-01-19 RX ADMIN — MOXIFLOXACIN HYDROCHLORIDE 400 MG: 400 INJECTION, SOLUTION INTRAVENOUS at 11:01

## 2017-01-19 RX ADMIN — METRONIDAZOLE 500 MG: 500 INJECTION, SOLUTION INTRAVENOUS at 05:01

## 2017-01-19 NOTE — PLAN OF CARE
Problem: Patient Care Overview  Goal: Plan of Care Review  Outcome: Ongoing (interventions implemented as appropriate)  Pt awake and alert but is confused. Pt frequently re oriented. No verbalized or observed complaints of pain. Soft wrist restraints intact. Family states that patients appetite is decreased and she has not been eating. Pt seems to have some difficulty swallowing meds this morning. Held dose of citric acid. Safety maintained. Bed in lowest position, side rails up x 3, HOB elevated, call light and personal items within reach. Pt family notified to call for assistance if needed. Pt family verbalizes understanding. Will continue to monitor. Family remains at bedside.

## 2017-01-19 NOTE — PROGRESS NOTES
Infectious Disease Follow Up Note      Assessment:    CAP/Possible Aspiration PNA -  Remains afebrile. WBC 21 trended down to WNL. RPR negative. Crypto Ag pending , repeat BCx 1/17 NGTD. Per primary team, want to continue acyclovir for 7 days, DC 1/18. CXR 1/18 obtained for pleural rub, pending report. Currently on Moxi/Flagyl.    Generalized Tonic Clonic Seizures -  On vimpat, neuro on board.     Hyponatremia/Non AGMA/Hypothyrodism/HLD - management per primary team     Plan and Recommendations:  1. Continue Moxifloxacin/Flagyl as patient is improving/leukocytosis resolved. Can consider graded challenge for switch to meropenem if patient clinically worsen (uncertain history of penicillins allergy, family reports it has been there for years).  2. Awaiting to obtain respiratory culture  3. Awaiting Cryptococcal Ag results.      Will discuss case with staff, Dr. Slade    Thank you for the consult, please call with questions.    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          Subjective and Interval History:    Interval History:   Son reports patient slept well overnight, is more alert compared to yesterday. Does endorses that patient does not want to eat but drinks supplements regularly.     HPI:  History was given by son, as patient is not fully oriented. Per son, at baseline, patient was more  "conversational, although she depends on son and his wife for ADLs and ambulate with assistance. Since Sat 1/7, patient appeared more confused to son, not responding to questions/prompts as normally would, which prompted visit to urgent care center on 1/9 where patient was told to have "UTI and CXR done," and prescribed Levaquin 250mg daily. On 1/10, patient visited ED with CXR results showed pulm edema vs PNA. Patient discharged with increased Levaquin to 750mg daily. On date of admit 1/11, son reported patient had symptoms of seizures, shaking and drooling for less than a minute, and resolved, no urinary/bowell incontinence. Patient was brought to ED and had another episode of seizure which resolved with ativan.      Hospital Course:  Patient presented afebrile, with WBC 15.7, which slightly improved but increased to 21.66 on 1/17 but trended down to WNL, procal negative  CXR 1/9 with RLL consolidation, CXR 1/11 with increased interstitial markings b/l and increased markings at left base unchanged  LP 1/11 PCR HSV negative, gram negative, BCx 1/11 NG  PCR CSF 1/11 no organism, rare WBC  CSF cell count: 6 WBC, 221 RBC, 42% neut, 53% lymphs  CT Chest 1/15 with extensive opacity in the left mainstem bronchus and left upper and left lower lobe bronchi favored to reflect mucous plugging although endobronchial lesions are not excluded, Post obstructive atelectasis vs consolidation in the basal segments of the left lower lobe. This could reflect aspiration pneumonia.   BCx 1/17 NGTD  RPR 1/17 negative   Cryptococcal Ag 1/17 pending  CXR 1/18 pending  Respiratory Cx no collected    Initially on Vanc/Aztreonam/Bactrim/Acyclovir then desescalated to Acyclovir/Moxi. Acyclovir DC 1/18. Currently on Moxi/Flagyl        Medications:  Antibiotics:   Antibiotics     Start     Stop Route Frequency Ordered    01/16/17 1145  moxifloxacin 400 mg/250 mL IVPB 400 mg      -- IV Every 24 hours (non-standard times) 01/16/17 1039    01/17/17 " "1715  metronidazole IVPB 500 mg      -- IV Every 8 hours (non-standard times) 01/17/17 1611          Physical Exam:  Vitals:    01/19/17 0435   BP: (!) 106/55   Pulse: 75   Resp: 18   Temp: 98 °F (36.7 °C)      Temp:  [97 °F (36.1 °C)-98.4 °F (36.9 °C)]     General: resting in bed with daughter at bedside, confused but follows commands   HEENT: Normocephalic. Atraumatic. PERRL. EOMI, no scleral icterus. No sinus tenderness. Moist oral mucosa.  Neck: supple, no cervical or supraclavicular lymphadenopathy  Pulmonary: diffused upper airway noises, pleural rubs   Cardiac: Regular rhythm. Normal S1 & S2. No audible murmurs or gallops. No JVD.   Abdominal: s, NT, ND, normoactive bowel sounds   Extremities: UE anasarca, pulses 2+ throughout.   Skin: Stage I/II sacral wound, no purulence discharge, diffused LE ecchymosis     Neurological: no oriented this AM, thinks she is at "[facility]," follows commands    Lines:  PIV    Labs:  CBC:   Lab Results   Component Value Date    WBC 11.96 01/19/2017    WBC 14.44 (H) 01/18/2017    WBC 21.66 (H) 01/17/2017    WBC 14.82 (H) 01/16/2017    WBC 10.65 01/15/2017    HCT 24.8 (L) 01/19/2017     01/19/2017        BMP:     Recent Labs  Lab 01/19/17  0555   *      K 3.9      CO2 21*   BUN 15   CREATININE 0.8   CALCIUM 7.7*       LFT:   Lab Results   Component Value Date    ALT 13 01/11/2017    AST 28 01/11/2017    ALKPHOS 59 01/11/2017    BILITOT 0.6 01/11/2017         Microbiology x 7d:   Microbiology Results (last 7 days)     Procedure Component Value Units Date/Time    Blood culture [[id number]] Collected:  01/17/17 1656    Order Status:  Completed Specimen:  Blood Updated:  01/18/17 2212     Blood Culture, Routine No Growth to date     Blood Culture, Routine No Growth to date    Blood culture [[id number]] Collected:  01/17/17 1656    Order Status:  Completed Specimen:  Blood Updated:  01/18/17 2212     Blood Culture, Routine No Growth to date     Blood Culture, " Routine No Growth to date    Cryptococcal antigen [[id number]] Collected:  17 0636    Order Status:  Sent Specimen:  Blood from Blood Updated:  17 1558    Culture, Respiratory with Gram Stain [[id number]]     Order Status:  No result Specimen:  Respiratory     CSF culture and Gram Stain (Tube 2) [[id number]] Collected:  17    Order Status:  Completed Specimen:  CSF (Spinal Fluid) from CSF Tap, Tube 2 Updated:  17 0711     CSF CULTURE No Growth     Gram Stain Result Rare WBC's      No organisms seen    Narrative:       On which sequentially labeled tube should this analysis be  performed?->2    Blood culture (site 2) [[id number]] Collected:  17    Order Status:  Completed Specimen:  Blood from Peripheral, Wrist, Right Updated:  17 0612     Blood Culture, Routine No growth after 5 days.    Narrative:       Site # 2, aerobic only    Blood culture (site 1) [[id number]] Collected:  17    Order Status:  Completed Specimen:  Blood from Peripheral, Hand, Right Updated:  17 0612     Blood Culture, Routine No growth after 5 days.    Narrative:       Site # 1, aerobic and anaerobic (central line, if patient has  one)            Imagin/18 US UE No evidence of DVT   CXR pending         Mohit Falcon MD  [facility] PGY-1

## 2017-01-19 NOTE — PROGRESS NOTES
In sinus now without any medications on board. Echocardiogram with normal EF and only mild aortic stenosis. She has audible wheezes today and at this time, would support and not start her on medications to maintain sinus rhythm with low blood pressures

## 2017-01-19 NOTE — PLAN OF CARE
Problem: Patient Care Overview  Goal: Plan of Care Review  Outcome: Ongoing (interventions implemented as appropriate)  CONT TO MONITOR RESP NEEDS

## 2017-01-19 NOTE — PROGRESS NOTES
[facility] Medicine Resident [facility] Progress Note    Subjective:      Patient much more awake and alert this morning, has no complaints.      Objective:   Last 24 Hour Vital Signs:  BP  Min: 91/52  Max: 126/59  Temp  Av.8 °F (36.6 °C)  Min: 97 °F (36.1 °C)  Max: 98.4 °F (36.9 °C)  Pulse  Av.9  Min: 75  Max: 90  Resp  Av.8  Min: 17  Max: 18  SpO2  Av.4 %  Min: 92 %  Max: 99 %  I/O last 3 completed shifts:  In: 1450 [IV Piggyback:1450]  Out: 400 [Urine:400]    Physical Examination:    General:   Awake, alert, oriented x1  Head:    Normocephalic and atraumatic  Eyes:    PERRL; EOMi with anicteric sclera and clear conjunctivae  Mouth:   Gurgling oral secretions while sleeping; dry mucous membranes  Cardio:   RRR, no murmurs  Resp:    diffucult to hear lung sounds given referred upper airway sounds  Abdom:   Soft, NTND  Extrem:   Edematous LUE>RUE, somewhat improved from prior  Pulses:   2+ and symmetric distally      Laboratory:  Laboratory Data  Pertinent Findings:  Recent Labs      17   0452  17   0742  17   0555   WBC  21.66*  14.44*   --    HGB  9.5*  8.4*  8.2*   HCT  28.6*  25.2*  24.8*   PLT  344  308  342   MCV  83  83  84   RDW  14.9*  15.0*  15.6*   GRAN  86.0*  74.5*  10.8*   --    LYMPH  4.0*  CANCELED  12.6*  1.8   --    MONO  4.0  CANCELED  11.7  1.7*   --    EOS  CANCELED  0.1   --    BASO  CANCELED  0.08   --    EOSINOPHIL  1.0  0.3   --    BASOPHIL  0.0  0.6   --        Recent Labs      17   1750  17   2348  17   0452  17   1205  17   0636   NA  130*  130*  130*  130*  132*   K  4.1  3.9  3.9  3.8  4.1   CL  101  102  104  104  106   CO2  15*  19*  17*  18*  17*   BUN  16  16  14  13  15   CREATININE  0.9  0.9  0.8  0.8  0.8     Recent Labs      17   1215  17   1635  17   POCTGLUCOSE  192*  184*  202*     No results for input(s): PROT, ALBUMIN, BILITOT, AST, ALT, ALKPHOS in the last 72  hours.    Microbiology Data  Pertinent Findings:  Blood cx NGTD  Crypto Ag pending    Other Results:  EKG (my interpretation): 1/18/17 EKG: AFib w/ , repeat EKG with junctional rhythm    Radiology Data   Pertinent Findings (my interpretation):  CXR: worsening LLL opacity    Current Medications:     Infusions:        Scheduled:   aspirin  81 mg Oral Daily    citric acid-sodium citrate 500-334 mg/5 ml  30 mL Oral TID    docusate sodium  100 mg Oral BID    enoxaparin  40 mg Subcutaneous Daily    guaifenesin 100 mg/5 ml  200 mg Oral Q4H    lacosamide (VIMPAT) IVPB  50 mg Intravenous Q12H    levothyroxine  75 mcg Oral Daily    metronidazole  500 mg Intravenous Q8H    moxifloxacin  400 mg Intravenous Q24H    polyethylene glycol  17 g Oral Daily    pravastatin  20 mg Oral Daily    senna  8.6 mg Oral Daily    sodium chloride 0.9%  3 mL Intravenous Q8H        PRN:  dextrose 50%, dextrose 50%, glucagon (human recombinant), glucose, glucose    Antibiotics and Day Number of Therapy:  Moxifloxacin 400mg IV q24hr Day 5  Flagyl 500mg q8hr Day 2    Lines and Day Number of Therapy:  PIV x1    Assessment:     Sue Capone is a 89 y.o.female with  Patient Active Problem List    Diagnosis Date Noted    CAP (community acquired pneumonia)     Aspiration pneumonia of left lower lobe     Hyponatremia 01/16/2017    Generalized convulsive epilepsy with intractable epilepsy     Seizure 01/11/2017    Seizures 01/11/2017    Dementia without behavioral disturbance 09/01/2016    History of breast cancer 09/01/2016    Pre-diabetes 09/01/2016    Hypothyroidism 09/01/2016    Dyslipidemia 09/01/2016        Plan:     89 yo F w/ medical hx of Pneumonia and UTI discharged 1 day ago from ED on Levofloxacin, hypothyroidism, HLD, Breast CA w/ resection and radiation in 1995 with complaint of seizure x1 day presents with      Generalized Tonic Clonic Seizures  - DDx includes meningitis/encephalitis vs hyponatremia  - Tonic  clonic seizures x2 witnessed, no loss bowel/bladder, first self resolved, seizure in ED resolved after Lorazepam 1mg IV  - AMS, fever and chills x2 days. Sent home from ED on 1/10/17 with Levofloxacin 750mg PO for pneumonia (CURB 65 =1)  - Initial vitals, afebrile 98.2F, HR 90, /74, SaO2 95% RA  - At admission, WBC 15.2, afebrile. No nuchal rigidity.   - 1/12/17 Blood cx x2: no growth to date  - 1/11/17 LP results: Colorless, WBC 6, , Diff: 42% Neut, 53% Lymph, Glu 69, Prot 53  - 1/11/17 MRI Brain: Motion limited examination, no intracranial mass lesion or acute pathology, mild chronic ischemic changes  - 1/12/17 EEG: static encephalopathy without focal changes nor an epileptic process   - 1/11/17 HSV CSF PCR: Negative  - Consult Neurology (Christian): MRI neg, consider MRI w/ epilespy protocol. EEG encephalopathic, but no epileptiform activity noted. Continue Vimpat  - As per ID ([place]): Patient currelty on Acyclovir IV for meningitis/encephalitis, will continue due to elevated RBC in CSF.  - Decreased dose of Vimpat to 50mg IV q12 due to potentially sedating effects     Paroxysmal Atrial Fibrilation  - New onset as of 1/18/17 EKG  - cards was consulted, they recommended considering starting diltiazem, which we held off on doing 2/2 hypotension  - repeat EKG after fluid rehydration yesterday showed junctional rhythm replacing afib, will obtain another EKG this morning  - CHADSVASC = 3    Hypotension-resolved  - Likely secondary to poor PO intake, BPs improved overnight after IVF rehydration   - Will continue to monitor, give IVF prn    Hypovolemic Hyponatremia-resolved  - 1/13/17 Na 122, Urine Osm 500, Urine Na 72.  - Urine labs indicate possible SIADH vs RTA4. Ruled out hypothyroid (TSH/T4 wnl), [place]'s (AM cortisol wnl)  - 1/15/17 CT Chest: L main bronchus, LLL & ELIAS mucus plug vs endobronchial lesion. Post obstructive atelectasis vs consolidation.  - As per nephrology recommendations, continue free  water restrict, PO salt tabs, Lasix IV. Monitor q6hr BMP. No indication for 3% saline at this time. If no clinical improvement, consider Vaptan  - Continue to monitor BMP, titrate salt tabs, fluid restrict, continue lasix pushes as per nephrology, consider administering Vaptan in ICU if sodium decreases.  - Na was 132 yesterday, BMP this morning still pending      Community Acquired Pneumonia  - 1/9/17 CXR: RLL consolidation  - At admission on exam, Lungs CTAB  - Negative Procalcitonin <0.09  - Discontinued Vancomycin / Moxifloxacin (de-escalated from Vanc/Aztreonam/Bactrim IV)  - As per ID ([place]) recommendations: continue Moxifloxacin / Flagyl for now.  - will obtain repeat CXR this mornign per ID recs    Somnolence  -patient currently on lowest dose of vimpat possible  -neuro recommending addition of modafinil 100mg daily  -will add modafinil today and monitor status    Non-Anion Gap Metabolic Acidosis  - 1/14/17 HCO3- 15, HCO3- decreasing since admission  - Possibly RTA, consider urine studies  - Continue to monitor BMP      Normocytic Anemia  - At admission, H/H 11.2/33.5 MCV 83  - 1/11/17 Iron 44, Ferritin 115. B12 and Folate wnl  - Consider treatment with PO iron supplementation after acute illness.      Constipation  - As per family, no BM in 5 days  - In ED, patient disimpacted  - Continue miralax PRN, colace. Consider lactulose supp. May add/titrate meds as needed  - BM reported 1/12  - Continue colace, senna, miralax prn      Hypothyroidism  - 1/11/17 TSH: 2.219, FT4 1.18  - Continue home synthroid      HLD  - 1/3/17 Lipid Panel: TChol 153, LDL 71  - Continue home statin      DVT PPx: Lovenox  Diet: Pureed  Code: DNR  Social: Lives at home. PT/OT: family refuses HH PT/OT. DME hospital bed      Disposition: Pending clinical improvement, plan to discuss goals of care with family today    Zahra Molina  [facility] Internal Medicine HO-I  [facility] Internal [facility] Service Team B    [facility] Medicine Hospitalist Pager  numbers:   [facility] Hospitalist Medicine Team A (Benita/Elpidio): [phone]  [facility] Hospitalist Medicine Team B (Jolie/Jorge):  [phone]

## 2017-01-19 NOTE — PROGRESS NOTES
Spoke with Dr. Stephens regarding restraint order; MD stated to remove restraints for now and monitor pt. If pt begins to remove medical equipment call MD.

## 2017-01-19 NOTE — PLAN OF CARE
Problem: Patient Care Overview  Goal: Plan of Care Review  Outcome: Ongoing (interventions implemented as appropriate)  Pt on room air in no apparent distress.  CPT done with electric percussor with no apparent distress.  Will cont. To monitor.

## 2017-01-19 NOTE — PLAN OF CARE
TN met with pt and her son Mr. Justin Rose   pt is awake but doesn't respond to questions       tentative d/c plan:   home with family, HH and pt/ot,   son wants transport chair for home use        01/19/17 1244   Discharge Reassessment   Assessment Type Discharge Planning Reassessment   Can the patient answer the patient profile reliably? No, cognitively impaired   How does the patient rate their overall health at the present time? (pt doesn't answer questions   )   Describe the patient's ability to walk at the present time. Does not walk or unable to take any steps at all   How often would a person be available to care for the patient? Whenever needed   Number of comorbid conditions (as recorded on the chart) (UTI    )   During the past month, has the patient often been bothered by feeling down, depressed or hopeless? (pt is awake but doesn't answer questions   )   Discharge Plan A Home;Home with family;Home Health   Change in patient condition or support system No   Patient choice form signed by patient/caregiver No   Explained to the the patient/caregiver why the discharge planned changed: (n/a   )   Involved the patient/caregiver in establishing a new discharge plan: Yes

## 2017-01-19 NOTE — PLAN OF CARE
Problem: Patient Care Overview  Goal: Plan of Care Review  Outcome: Ongoing (interventions implemented as appropriate)  Soft wrist restraints re ordered by Dr. Stephens

## 2017-01-19 NOTE — NURSING
"Dr. Choi team paged. Dr Bai aware patient sounds extremely wet. MD states "we are already aware." No new orders received. Will continue to monitor patient.  "

## 2017-01-20 LAB
ANION GAP SERPL CALC-SCNC: 7 MMOL/L
ANISOCYTOSIS BLD QL SMEAR: SLIGHT
BASOPHILS # BLD AUTO: 0.1 K/UL
BASOPHILS NFR BLD: 0.6 %
BUN SERPL-MCNC: 13 MG/DL
CALCIUM SERPL-MCNC: 8.1 MG/DL
CHLORIDE SERPL-SCNC: 107 MMOL/L
CO2 SERPL-SCNC: 22 MMOL/L
CREAT SERPL-MCNC: 0.7 MG/DL
DIFFERENTIAL METHOD: ABNORMAL
EOSINOPHIL # BLD AUTO: 0.3 K/UL
EOSINOPHIL NFR BLD: 1.5 %
ERYTHROCYTE [DISTWIDTH] IN BLOOD BY AUTOMATED COUNT: 15.9 %
EST. GFR  (AFRICAN AMERICAN): >60 ML/MIN/1.73 M^2
EST. GFR  (NON AFRICAN AMERICAN): >60 ML/MIN/1.73 M^2
GLUCOSE SERPL-MCNC: 127 MG/DL
HCT VFR BLD AUTO: 28.6 %
HGB BLD-MCNC: 9.7 G/DL
LYMPHOCYTES # BLD AUTO: 2.1 K/UL
LYMPHOCYTES NFR BLD: 12.6 %
MCH RBC QN AUTO: 28.7 PG
MCHC RBC AUTO-ENTMCNC: 33.9 %
MCV RBC AUTO: 85 FL
MONOCYTES # BLD AUTO: 2 K/UL
MONOCYTES NFR BLD: 12.3 %
NEUTROPHILS # BLD AUTO: 11.8 K/UL
NEUTROPHILS NFR BLD: 73 %
OVALOCYTES BLD QL SMEAR: ABNORMAL
PLATELET # BLD AUTO: 444 K/UL
PLATELET BLD QL SMEAR: ABNORMAL
PMV BLD AUTO: 9.7 FL
POCT GLUCOSE: 159 MG/DL (ref 70–110)
POIKILOCYTOSIS BLD QL SMEAR: SLIGHT
POTASSIUM SERPL-SCNC: 4.1 MMOL/L
RBC # BLD AUTO: 3.38 M/UL
SODIUM SERPL-SCNC: 136 MMOL/L
WBC # BLD AUTO: 16.3 K/UL

## 2017-01-20 PROCEDURE — 25000003 PHARM REV CODE 250: Performed by: INTERNAL MEDICINE

## 2017-01-20 PROCEDURE — 86580 TB INTRADERMAL TEST: CPT | Performed by: STUDENT IN AN ORGANIZED HEALTH CARE EDUCATION/TRAINING PROGRAM

## 2017-01-20 PROCEDURE — 80048 BASIC METABOLIC PNL TOTAL CA: CPT

## 2017-01-20 PROCEDURE — 36415 COLL VENOUS BLD VENIPUNCTURE: CPT

## 2017-01-20 PROCEDURE — C9254 INJECTION, LACOSAMIDE: HCPCS | Performed by: INTERNAL MEDICINE

## 2017-01-20 PROCEDURE — 63600175 PHARM REV CODE 636 W HCPCS: Performed by: STUDENT IN AN ORGANIZED HEALTH CARE EDUCATION/TRAINING PROGRAM

## 2017-01-20 PROCEDURE — 94761 N-INVAS EAR/PLS OXIMETRY MLT: CPT

## 2017-01-20 PROCEDURE — 97530 THERAPEUTIC ACTIVITIES: CPT

## 2017-01-20 PROCEDURE — 94668 MNPJ CHEST WALL SBSQ: CPT

## 2017-01-20 PROCEDURE — 63600175 PHARM REV CODE 636 W HCPCS: Performed by: INTERNAL MEDICINE

## 2017-01-20 PROCEDURE — 25000003 PHARM REV CODE 250: Performed by: STUDENT IN AN ORGANIZED HEALTH CARE EDUCATION/TRAINING PROGRAM

## 2017-01-20 PROCEDURE — 97110 THERAPEUTIC EXERCISES: CPT

## 2017-01-20 PROCEDURE — 11000001 HC ACUTE MED/SURG PRIVATE ROOM

## 2017-01-20 PROCEDURE — 27000221 HC OXYGEN, UP TO 24 HOURS

## 2017-01-20 PROCEDURE — 97802 MEDICAL NUTRITION INDIV IN: CPT

## 2017-01-20 PROCEDURE — 85025 COMPLETE CBC W/AUTO DIFF WBC: CPT

## 2017-01-20 RX ADMIN — GUAIFENESIN 200 MG: 100 SOLUTION ORAL at 08:01

## 2017-01-20 RX ADMIN — METRONIDAZOLE 500 MG: 500 INJECTION, SOLUTION INTRAVENOUS at 01:01

## 2017-01-20 RX ADMIN — GUAIFENESIN 200 MG: 100 SOLUTION ORAL at 05:01

## 2017-01-20 RX ADMIN — DOCUSATE SODIUM 100 MG: 100 CAPSULE, LIQUID FILLED ORAL at 08:01

## 2017-01-20 RX ADMIN — METRONIDAZOLE 500 MG: 500 INJECTION, SOLUTION INTRAVENOUS at 09:01

## 2017-01-20 RX ADMIN — GUAIFENESIN 200 MG: 100 SOLUTION ORAL at 01:01

## 2017-01-20 RX ADMIN — SENNOSIDES 8.6 MG: 8.6 TABLET ORAL at 09:01

## 2017-01-20 RX ADMIN — GUAIFENESIN 200 MG: 100 SOLUTION ORAL at 09:01

## 2017-01-20 RX ADMIN — MOXIFLOXACIN HYDROCHLORIDE 400 MG: 400 INJECTION, SOLUTION INTRAVENOUS at 11:01

## 2017-01-20 RX ADMIN — LEVOTHYROXINE SODIUM 75 MCG: 75 TABLET ORAL at 09:01

## 2017-01-20 RX ADMIN — SODIUM CHLORIDE 50 MG: 9 INJECTION, SOLUTION INTRAVENOUS at 05:01

## 2017-01-20 RX ADMIN — SODIUM CHLORIDE 50 MG: 9 INJECTION, SOLUTION INTRAVENOUS at 04:01

## 2017-01-20 RX ADMIN — METRONIDAZOLE 500 MG: 500 INJECTION, SOLUTION INTRAVENOUS at 05:01

## 2017-01-20 RX ADMIN — SODIUM CITRATE AND CITRIC ACID MONOHYDRATE 30 ML: 500; 334 SOLUTION ORAL at 02:01

## 2017-01-20 RX ADMIN — GUAIFENESIN 200 MG: 100 SOLUTION ORAL at 02:01

## 2017-01-20 RX ADMIN — PRAVASTATIN SODIUM 20 MG: 20 TABLET ORAL at 09:01

## 2017-01-20 RX ADMIN — SODIUM CITRATE AND CITRIC ACID MONOHYDRATE 30 ML: 500; 334 SOLUTION ORAL at 08:01

## 2017-01-20 RX ADMIN — TUBERCULIN PURIFIED PROTEIN DERIVATIVE 5 UNITS: 5 INJECTION, SOLUTION INTRADERMAL at 04:01

## 2017-01-20 RX ADMIN — SODIUM CHLORIDE, PRESERVATIVE FREE 3 ML: 5 INJECTION INTRAVENOUS at 02:01

## 2017-01-20 RX ADMIN — ENOXAPARIN SODIUM 40 MG: 100 INJECTION SUBCUTANEOUS at 11:01

## 2017-01-20 NOTE — PROGRESS NOTES
received 142 from the state giving approval for nursing facility admission.   attached 142 and pasrr in right care.

## 2017-01-20 NOTE — PROGRESS NOTES
11:30am - made phone contact with long term care access services and completed a level of care eligibility tool (LOCET) for nursing facility admission approval.  Then faxed a level one pasrr screen and determination form to the state for nursing facility admission approval.

## 2017-01-20 NOTE — PLAN OF CARE
Problem: Physical Therapy Goal  Goal: Physical Therapy Goal  Goals to be met by: 17     Patient will increase functional independence with mobility by performin. Supine to sit with Moderate Assistance  2. Sit to supine with Moderate Assistance  3. Bed to chair transfer with Moderate Assistance   4. Sitting at edge of bed x15 minutes with Minimal Assistance  5. Lower extremity exercise program x 10-15 reps per handout, with assistance as needed    Recommendations: Recommend d/c home with / supervision/assistance for safety. Recommended DME includes hospital bed. Pts family declined HH PT/OT.    Pt w/ slow progression towards goals, able to sit EOB x 12' w/ min./modA x 1.

## 2017-01-20 NOTE — PT/OT/SLP PROGRESS
Physical Therapy  Treatment    Sue Capone   MRN: [id number]   Admitting Diagnosis: Hyponatremia    PT Received On: 17  PT Start Time: 1013     PT Stop Time: 1049    PT Total Time (min): 36 min       Billable Minutes:  There Act 24 min., There ex 12 min.    Treatment Type: Treatment  PT/PTA: PTA     PTA Visit Number: 4       General Precautions: Standard, aspiration, hearing impaired, fall, diabetic, seizure (son reports pt is deaf)  Orthopedic Precautions: N/A   Braces:      Do you have any cultural, spiritual, Uatsdin conflicts, given your current situation?: No    Subjective:  Communicated with ns prior to session.  Pt 's son present for tx., pt agreeable to tx.,     Pain Ratin/10              Pain Rating Post-Intervention: 0/10    Objective:   Patient found with: peripheral IV, restraints, telemetry, PRAFO    Functional Mobility:  Bed Mobility:   Rolling/Turning Right: Maximum assistance, Total assistance, With side rail  Supine to Sit: Maximum Assistance, Total Assistance, With side rail  Sit to Supine: Total Assistance    Transfers:       Gait:        Stairs:  n/a    Balance:   Static Sit: POOR+: Needs MINIMAL assist to maintain  Dynamic Sit: POOR: N/A  Static Stand: n/a  Dynamic stand: n/a    Therapeutic Activities and Exercises:  Pt perf'd sup to sit w/ max.A/tot A x 1 person, pt assisting somewhat w/ reaching for railing to turn  To side and with moving LE's toward EOB. Pt sat up ~12' w/ min/modA x 1, cueing for using UE's to assist w/ holding self upright. Perf'd seated LAQ's w/ mod.A x 5 reps ea. Pt coughing a lot while sitting up, but non-productive. Pt. ret'd to supine w/ tot A at trunk and LE's, Dep x 2 slide up to HOB, son assisting. Pt. Then needed HOB elevated to assist w/ breathing, 2/2 SOB. Pt. perf'd AAROM LE ex's of AP's, hip abd/add, and SAq's x 10 ea.AAROM UE shldr flex x 10 ea.    AM-PAC 6 CLICK MOBILITY  How much help from another person does this patient currently need?   1 =  Unable, Total/Dependent Assistance  2 = A lot, Maximum/Moderate Assistance  3 = A little, Minimum/Contact Guard/Supervision  4 = None, Modified [place]/Independent    Turning over in bed (including adjusting bedclothes, sheets and blankets)?: 2  Sitting down on and standing up from a chair with arms (e.g., wheelchair, bedside commode, etc.): 2  Moving from lying on back to sitting on the side of the bed?: 2  Moving to and from a bed to a chair (including a wheelchair)?: 2  Need to walk in hospital room?: 1  Climbing 3-5 steps with a railing?: 1  Total Score: 10    AM-PAC Raw Score CMS G-Code Modifier Level of Impairment Assistance   6 % Total / Unable   7 - 9 CM 80 - 100% Maximal Assist   10 - 14 CL 60 - 80% Moderate Assist   15 - 19 CK 40 - 60% Moderate Assist   20 - 22 CJ 20 - 40% Minimal Assist   23 CI 1-20% SBA / CGA   24 CH 0% Independent/ Mod I     Patient left HOB elevated with all lines intact, call button in reach, bed alarm on, ns notified and son present. PRAFO's reapplied, though son requested UE restraints to be left off, ns notified.    Assessment:  Sue Capone is a 89 y.o. female with a medical diagnosis of Hyponatremia and presents with dec. Mobility, dec strength/endurance. Pt. Fatigues quickly w/ sitting up EOB, inc SOB noted w/ O2 on.    Rehab identified problem list/impairments: Rehab identified problem list/impairments: weakness, impaired endurance, impaired self care skills, impaired functional mobilty, impaired balance, decreased coordination, edema    Rehab potential is good.    Activity tolerance: Fair    Discharge recommendations: Discharge Facility/Level Of Care Needs: home health PT     Barriers to discharge: Barriers to Discharge: None    Equipment recommendations: Equipment Needed After Discharge: hospital bed     GOALS:   Physical Therapy Goals        Problem: Physical Therapy Goal    Goal Priority Disciplines Outcome Goal Variances Interventions   Physical Therapy Goal      PT/OT, PT Ongoing (interventions implemented as appropriate)     Description:  Goals to be met by: 17     Patient will increase functional independence with mobility by performin. Supine to sit with Moderate Assistance  2. Sit to supine with Moderate Assistance  3. Bed to chair transfer with Moderate Assistance   4. Sitting at edge of bed x15 minutes with Minimal Assistance  5. Lower extremity exercise program x 10-15 reps per handout, with assistance as needed    Recommendations: Recommend d/c home with / supervision/assistance for safety. Recommended DME includes hospital bed. Pt's family declined HH PT/OT.                 PLAN:    Patient to be seen 3 x/week  to address the above listed problems via therapeutic activities, therapeutic exercises, neuromuscular re-education  Plan of Care expires: 17  Plan of Care reviewed with: patient, son         Ashly Abdi, PTA  2017

## 2017-01-20 NOTE — PLAN OF CARE
Problem: Patient Care Overview  Goal: Plan of Care Review  Outcome: Ongoing (interventions implemented as appropriate)  Pt on documented O2, no respiratory distress noted. Will continue to monitor.

## 2017-01-20 NOTE — PLAN OF CARE
Problem: Patient Care Overview  Goal: Plan of Care Review  Outcome: Ongoing (interventions implemented as appropriate)  Pt awake and alert but is confused. Pt frequently re oriented. No verbalized or observed complaints of pain. Soft wrist restraints intact. Rhonchi noted upon auscultation. Pt on 2 1/2 liters of 02. Family states that patients appetite is still decreased. Safety maintained. Bed in lowest position, side rails up x 3, HOB elevated, call light and personal items within reach. Pt family notified to call for assistance if needed. Pt family verbalizes understanding. Will continue to monitor. Family remains at bedside.

## 2017-01-20 NOTE — PLAN OF CARE
Problem: Nutrition, Imbalanced: Inadequate Oral Intake (Adult)  Goal: Improved Oral Intake  Patient will demonstrate the desired outcomes by discharge/transition of care.  Outcome: Ongoing (interventions implemented as appropriate)  Recommendation/Intervention:   1. Consider adding ADA restrictions to diet & changing Boost Plus to Boost Glucose Control 2/2 pt hx DM.   2. Encourage good intake at meals.   3. Encourage diet adherence to lfluid restriciton & nectar thickened liquids.     Goals:   Pt will consume at least 50% intake at meals  Nutrition Goal Status: new  Communication of RD Recs: reviewed with RN (Jennyfer)

## 2017-01-20 NOTE — PROGRESS NOTES
TN informed that pt's family is now open to SNF [facility] for pt   TN met with pt's son Mr. Rose -  pref facilities:  1.  [facility];  2  [facility] to facilitate [facility]

## 2017-01-20 NOTE — PROGRESS NOTES
consulted to assist with discharge planning, need skilled nursing facility placement.  Transition navigator, Juju Zaragoza spoke with son regarding preference for skilled nursing facility placement.   faxed referral to son's first preference, [facility] and second preference, [facility].

## 2017-01-20 NOTE — CONSULTS
[facility]  Adult Nutrition  Consult Note    SUMMARY     Recommendations    Recommendation/Intervention:   1. Consider adding ADA restrictions to diet & changing Boost Plus to Boost Glucose Control 2/2 pt hx DM.   2. Encourage good intake at meals.   3. Encourage diet adherence to lfluid restriciton & nectar thickened liquids.    Goals:   Pt will consume at least 50% intake at meals  Nutrition Goal Status: new  Communication of RD Recs: reviewed with RN (Jennyfer)    Continuum of Care Plan  Referral to Outpatient Services:  (pt to d/c on current diet)    Reason for Assessment  Reason for Assessment: nurse/nurse practitioner consult (poor oral intake/wound)  Relevent Medical History: 89 yo female admitted with hyponateremia/seizures/pneumonia. PMH + for HTN, DM, thyroid disease, cholecystectomy.      General Information Comments: Pt on pureed diet with nectar thick liquids & 1000 ml fluid restriction with Boost Plus tid. Educated pt family at bedside on fluid restriction & thickening of liquids.    Nutrition Prescription Ordered  Current Diet Order: pureed 1000 ml fluid restriction nectar thick liquids     Nutrition Risk Screen  Nutrition Risk Screen: no indicators present    Nutrition/Diet History  Patient Reported Diet/Restrictions/Preferences:  (soft foods)  Food Preferences: no [place] or cultural food prefs identifed  Factors Affecting Nutritional Intake: decreased appetite, difficulty/impaired swallowing     Labs/Tests/Procedures/Meds  Pertinent Labs Reviewed: reviewed  Pertinent Labs Comments: Glu 127H, Ca 8.1L  Pertinent Medications Reviewed: reviewed  Pertinent Medications Comments: aspirin    Physical Findings  Overall Physical Appearance: loss of muscle mass  Tubes:  (none)  Oral/Mouth Cavity: poor dentition  Skin:  (Rell 12-stg II sacral spine)    Anthropometrics  Height (inches): 62.01 in  Weight Method: Stated  Weight (kg): 47.2 kg  Ideal Body Weight (IBW), Female: 110.05 lb  %  Ideal Body Weight, Female (lb): 94.56 lb  BMI (kg/m2): 19.03  BMI Grade: 18.5-24.9 - normal     Estimated/Assessed Needs  Weight Used For Calorie Calculations: 47.2 kg (104 lb 0.9 oz)   Height (cm): 157.5 cm  Energy Need Method:  (9743-7013 (MSJ x 1.3-1.4))   RMR ([place]-St. Jeor Equation): 832.42  Weight Used For Protein Calculations: 47.2 kg (104 lb 0.9 oz)  Protein Requirements: 47-57g (1.0-1.2 gkg)    Malnutrition (Undernutrition) Diagnosis  % Intake of Estimated Energy Needs: 0 - 25%  % Meal Intake: 25%     Nutrition Diagnosis  Nutrition Problem: Inadequate energy intake  Etiology/Related To: poor appetite/dysphagia  Nutrition Diagnosis Signs/Symptoms As Evidenced By: pt with poor po intake  Nutrition Diagnosis Status: New    Monitor and Evaluation  Food and Nutrient Intake: food and beverage intake  Food and Nutrient Adminstration: diet order  Physical Activity and Function: nutrition-related ADLs and IADLs  Anthropometric Measurements: weight  Biochemical Data, Medical Tests and Procedures: electrolyte and renal panel  Nutrition-Focused Physical Findings: overall appearance    Nutrition Risk  Level of Risk: moderate    Nutrition Follow-Up  RD Follow-up?: Yes

## 2017-01-20 NOTE — PROGRESS NOTES
"[facility] Resident HO-I Progress Note    Subjective:      Patient unable to answer questions due to mental status.     Objective:   Last 24 Hour Vital Signs:  BP  Min: 110/60  Max: 166/70  Temp  Av.7 °F (36.5 °C)  Min: 97.3 °F (36.3 °C)  Max: 98 °F (36.7 °C)  Pulse  Av.4  Min: 78  Max: 94  Resp  Av.6  Min: 17  Max: 24  SpO2  Av.4 %  Min: 93 %  Max: 97 %  I/O last 3 completed shifts:  In: 1393 [P.O.:440; I.V.:3; IV Piggyback:950]  Out: 566 [Urine:503; Stool:63]    Physical Examination:    General:   Sleeping in bed, in no apparent distress  Head:    Normocephalic and atraumatic  Eyes:    PERRL; EOMi with anicteric sclera and clear conjunctivae  Mouth:   Gurgling oral secretions while sleeping; dry mucous membranes  Cardio:   Irregularly irregular rhythm, with normal S1 and S2; no murmurs or rubs  Resp:    CTAB; respirations unlabored; no wheezes, crackles or rhonchi  Abdom:   Soft, NTND with normoactive bowel sounds  Extrem:   Warm and well-perfused with no clubbing, cyanosis or edema  Pulses:   2+ and symmetric distally  Neuro:   Awoken to light touch, Response appropriately to "goodmorning", recognizes family members at bedside by name, moves all extremities spontaneously.    Laboratory:  Laboratory Data  Pertinent Findings:  Recent Labs      17   0742  17   0555  17   0531   WBC  14.44*  11.96  16.30*   HGB  8.4*  8.2*  9.7*   HCT  25.2*  24.8*  28.6*   PLT  308  342  444*   MCV  83  84  85   RDW  15.0*  15.6*  15.9*   GRAN  74.5*  10.8*  70.4  8.3*  73.0  11.8*   LYMPH  12.6*  1.8  15.3*  1.8  12.6*  2.1   MONO  11.7  1.7*  11.7  1.4*  12.3  2.0*   EOS  0.1  0.2  0.3   BASO  0.08  0.09  0.10   EOSINOPHIL  0.3  1.8  1.5   BASOPHIL  0.6  0.8  0.6       Recent Labs      17   1205  17   0636  17   0555  17   0531   NA  130*  132*  136  136   K  3.8  4.1  3.9  4.1   CL  104  106  109  107   CO2  18*  17*  21*  22*   BUN  13  15  15  13 "   CREATININE  0.8  0.8  0.8  0.7     Recent Labs      01/18/17   1215  01/18/17   1635  01/18/17   2021  01/19/17   1229  01/19/17   1634   POCTGLUCOSE  192*  184*  202*  235*  184*     No results for input(s): PROT, ALBUMIN, BILITOT, AST, ALT, ALKPHOS in the last 72 hours.    Microbiology Data  Pertinent Findings:  No new data    Other Results:  EKG (my interpretation): No new EKG    Radiology Data   Pertinent Findings (my interpretation):  - 1/19/17 CXR: near-complete opacification of the left hemithorax. Post obstructive vs pleural effusion.    Current Medications:     Infusions:        Scheduled:   aspirin  81 mg Oral Daily    citric acid-sodium citrate 500-334 mg/5 ml  30 mL Oral TID    docusate sodium  100 mg Oral BID    enoxaparin  40 mg Subcutaneous Daily    guaifenesin 100 mg/5 ml  200 mg Oral Q4H    lacosamide (VIMPAT) IVPB  50 mg Intravenous Q12H    levothyroxine  75 mcg Oral Daily    metronidazole  500 mg Intravenous Q8H    moxifloxacin  400 mg Intravenous Q24H    polyethylene glycol  17 g Oral Daily    pravastatin  20 mg Oral Daily    senna  8.6 mg Oral Daily    sodium chloride 0.9%  3 mL Intravenous Q8H        PRN:  dextrose 50%, dextrose 50%, glucagon (human recombinant), glucose, glucose    Antibiotics and Day Number of Therapy:  Flagyl 500mg IV q8hrs Day 4  Moxifloxacin 400mg IV q24hr Day 7    Lines and Day Number of Therapy:  PIV x1 22G    Assessment:     Sue Capone is a 89 y.o.female with  Patient Active Problem List    Diagnosis Date Noted    CAP (community acquired pneumonia)     Aspiration pneumonia of left lower lobe     Hyponatremia 01/16/2017    Generalized convulsive epilepsy with intractable epilepsy     Seizure 01/11/2017    Seizures 01/11/2017    Dementia without behavioral disturbance 09/01/2016    History of breast cancer 09/01/2016    Pre-diabetes 09/01/2016    Hypothyroidism 09/01/2016    Dyslipidemia 09/01/2016        Plan:        89 yo F w/ medical hx of  Pneumonia and UTI discharged 1 day ago from ED on Levofloxacin, hypothyroidism, HLD, Breast CA w/ resection and radiation in 1995 with complaint of seizure x1 day presents with      Generalized Tonic Clonic Seizures  - DDx includes meningitis/encephalitis vs hyponatremia  - Tonic clonic seizures x2 witnessed, no loss bowel/bladder, first self resolved, seizure in ED resolved after Lorazepam 1mg IV  - AMS, fever and chills x2 days. Sent home from ED on 1/10/17 with Levofloxacin 750mg PO for pneumonia (CURB 65 =1)  - Initial vitals, afebrile 98.2F, HR 90, /74, SaO2 95% RA  - At admission, WBC 15.2, afebrile. No nuchal rigidity.   - 1/12/17 Blood cx x2: no growth to date  - 1/11/17 LP results: Colorless, WBC 6, , Diff: 42% Neut, 53% Lymph, Glu 69, Prot 53  - 1/11/17 MRI Brain: Motion limited examination, no intracranial mass lesion or acute pathology, mild chronic ischemic changes  - 1/12/17 EEG: static encephalopathy without focal changes nor an epileptic process   - 1/11/17 HSV CSF PCR: Negative  - Consult Neurology (Christian): MRI neg, consider MRI w/ epilespy protocol. EEG encephalopathic, but no epileptiform activity noted. Continue Vimpat  - As per ID ([place]): Patient given Acyclovir IV for meningitis/encephalitis, due to elevated RBC and protein in CSF. Therapy complete, total 7 days.  - Decreased dose of Vimpat to 50mg IV q12 due to potentially sedating effects      Paroxysmal Atrial Fibrilation  - New onset as of 1/18/17 EKG  - CHADSVASC = 3. As per Cardiology recommendations (Heriberto) Will hold anti-coagulation due to patient is fall risk.  - Cardiology remmendations: considerstarting diltiazem. Held diltiazem for Afib 2/2 hypotension  - repeat EKG after fluid rehydration showed junctional rhythm replacing afib.  - 1/19/17 EKG: Sinus rhythm, back to baseline     Hypotension-resolved  - Likely secondary to poor PO intake, BPs improved overnight after IVF rehydration   - Will continue to  monitor, give IVF prn, encourage diet     Hypovolemic Hyponatremia-resolved  - 1/13/17 Na 122, Urine Osm 500, Urine Na 72.  - Urine labs indicate possible SIADH vs RTA4. Ruled out hypothyroid (TSH/T4 wnl), [place]'s (AM cortisol wnl)  - 1/15/17 CT Chest: L main bronchus, LLL & ELIAS mucus plug vs endobronchial lesion. Post obstructive atelectasis vs consolidation.  - As per nephrology recommendations, continue free water restrict, PO salt tabs, Lasix IV. Monitor q6hr BMP. No indication for 3% saline at this time. If no clinical improvement, consider Vaptan  - Continue to monitor BMP, titrate salt tabs, fluid restrict, continue lasix pushes as per nephrology, consider administering Vaptan in ICU if sodium decreases.  - Today, Na 136. Continue to monitor      Community Acquired Pneumonia  - 1/9/17 CXR: RLL consolidation  - At admission on exam, Lungs CTAB  - Patient has been afebrile.  - 1/17/17 WBC peaked at WBC 21. Currently, WBC wnl.  - 1/11/17 Procalcitonin <0.09  - 1/19/17 CXR: near-complete opacification of the left hemithorax. Post obstructive vs pleural effusion.  - Discontinued Vancomycin / Moxifloxacin (de-escalated from Vanc/Aztreonam/Bactrim IV)  - As per ID ([place]) recommendations: continue Moxifloxacin / Flagyl for now.     Somnolence  - Patient currently on lowest dose of vimpat possible  - Neuro recommending addition of modafinil 100mg daily  - Will consider adding modafinil if mental status worsens     Non-Anion Gap Metabolic Acidosis  - 1/14/17 HCO3- 15, HCO3- decreasing since admission  - Possibly RTA, consider urine studies  - Continue to monitor BMP      Normocytic Anemia  - At admission, H/H 11.2/33.5 MCV 83  - 1/11/17 Iron 44, Ferritin 115. B12 and Folate wnl  - Consider treatment with PO iron supplementation after acute illness.      Constipation  - As per family, no BM in 5 days  - In ED, patient disimpacted  - Continue miralax PRN, colace. Consider lactulose supp. May add/titrate meds as  needed  - BM reported 1/12  - Continue colace, senna, miralax prn      Hypothyroidism  - 1/11/17 TSH: 2.219, FT4 1.18  - Continue home synthroid      HLD  - 1/3/17 Lipid Panel: TChol 153, LDL 71  - Continue home statin      DVT PPx: Lovenox  Diet: Pureed  Code: DNR  Social: Lives at home. PT/OT: family refuses HH PT/OT. DME hospital bed      Disposition: Pending improvement of mental status. Discuss goals of care with family.    Ming Bai  [facility] Internal Medicine HO-I  [facility] Internal Medicine Service Team B    [facility] Medicine Hospitalist Pager numbers:   [facility] Hospitalist Medicine Team A (Benita/Elpidio): [phone]  [facility] Hospitalist Medicine Team B (Jolie/Jorge):  [phone]

## 2017-01-21 LAB
ANION GAP SERPL CALC-SCNC: 8 MMOL/L
BASOPHILS # BLD AUTO: 0.16 K/UL
BASOPHILS NFR BLD: 1 %
BUN SERPL-MCNC: 10 MG/DL
CALCIUM SERPL-MCNC: 7.7 MG/DL
CHLORIDE SERPL-SCNC: 108 MMOL/L
CO2 SERPL-SCNC: 22 MMOL/L
CREAT SERPL-MCNC: 0.7 MG/DL
DIFFERENTIAL METHOD: ABNORMAL
EOSINOPHIL # BLD AUTO: 0.2 K/UL
EOSINOPHIL NFR BLD: 1.2 %
ERYTHROCYTE [DISTWIDTH] IN BLOOD BY AUTOMATED COUNT: 16.1 %
EST. GFR  (AFRICAN AMERICAN): >60 ML/MIN/1.73 M^2
EST. GFR  (NON AFRICAN AMERICAN): >60 ML/MIN/1.73 M^2
GLUCOSE SERPL-MCNC: 128 MG/DL
HCT VFR BLD AUTO: 28.7 %
HGB BLD-MCNC: 9.3 G/DL
LYMPHOCYTES # BLD AUTO: 1.8 K/UL
LYMPHOCYTES NFR BLD: 11 %
MCH RBC QN AUTO: 27.4 PG
MCHC RBC AUTO-ENTMCNC: 32.4 %
MCV RBC AUTO: 85 FL
MONOCYTES # BLD AUTO: 1.7 K/UL
MONOCYTES NFR BLD: 10.8 %
NEUTROPHILS # BLD AUTO: 12 K/UL
NEUTROPHILS NFR BLD: 76 %
PLATELET # BLD AUTO: 466 K/UL
PMV BLD AUTO: 9.9 FL
POCT GLUCOSE: 123 MG/DL (ref 70–110)
POCT GLUCOSE: 150 MG/DL (ref 70–110)
POCT GLUCOSE: 217 MG/DL (ref 70–110)
POCT GLUCOSE: 223 MG/DL (ref 70–110)
POTASSIUM SERPL-SCNC: 3.7 MMOL/L
RBC # BLD AUTO: 3.39 M/UL
SODIUM SERPL-SCNC: 138 MMOL/L
WBC # BLD AUTO: 15.96 K/UL

## 2017-01-21 PROCEDURE — 11000001 HC ACUTE MED/SURG PRIVATE ROOM

## 2017-01-21 PROCEDURE — 25000003 PHARM REV CODE 250: Performed by: STUDENT IN AN ORGANIZED HEALTH CARE EDUCATION/TRAINING PROGRAM

## 2017-01-21 PROCEDURE — 87070 CULTURE OTHR SPECIMN AEROBIC: CPT

## 2017-01-21 PROCEDURE — 31720 CLEARANCE OF AIRWAYS: CPT

## 2017-01-21 PROCEDURE — 63600175 PHARM REV CODE 636 W HCPCS: Performed by: INTERNAL MEDICINE

## 2017-01-21 PROCEDURE — 94668 MNPJ CHEST WALL SBSQ: CPT

## 2017-01-21 PROCEDURE — 36415 COLL VENOUS BLD VENIPUNCTURE: CPT

## 2017-01-21 PROCEDURE — 27000221 HC OXYGEN, UP TO 24 HOURS

## 2017-01-21 PROCEDURE — 94761 N-INVAS EAR/PLS OXIMETRY MLT: CPT

## 2017-01-21 PROCEDURE — 87205 SMEAR GRAM STAIN: CPT

## 2017-01-21 PROCEDURE — 80048 BASIC METABOLIC PNL TOTAL CA: CPT

## 2017-01-21 PROCEDURE — C9254 INJECTION, LACOSAMIDE: HCPCS | Performed by: INTERNAL MEDICINE

## 2017-01-21 PROCEDURE — 25000003 PHARM REV CODE 250: Performed by: INTERNAL MEDICINE

## 2017-01-21 PROCEDURE — 85025 COMPLETE CBC W/AUTO DIFF WBC: CPT

## 2017-01-21 RX ORDER — NAPROXEN SODIUM 220 MG/1
81 TABLET, FILM COATED ORAL DAILY
Status: DISCONTINUED | OUTPATIENT
Start: 2017-01-22 | End: 2017-01-23 | Stop reason: HOSPADM

## 2017-01-21 RX ADMIN — PRAVASTATIN SODIUM 20 MG: 20 TABLET ORAL at 09:01

## 2017-01-21 RX ADMIN — SODIUM CITRATE AND CITRIC ACID MONOHYDRATE 30 ML: 500; 334 SOLUTION ORAL at 09:01

## 2017-01-21 RX ADMIN — GUAIFENESIN 200 MG: 100 SOLUTION ORAL at 03:01

## 2017-01-21 RX ADMIN — GUAIFENESIN 200 MG: 100 SOLUTION ORAL at 06:01

## 2017-01-21 RX ADMIN — LEVOTHYROXINE SODIUM 75 MCG: 75 TABLET ORAL at 09:01

## 2017-01-21 RX ADMIN — METRONIDAZOLE 500 MG: 500 INJECTION, SOLUTION INTRAVENOUS at 10:01

## 2017-01-21 RX ADMIN — SODIUM CHLORIDE, PRESERVATIVE FREE 3 ML: 5 INJECTION INTRAVENOUS at 02:01

## 2017-01-21 RX ADMIN — GUAIFENESIN 200 MG: 100 SOLUTION ORAL at 10:01

## 2017-01-21 RX ADMIN — SODIUM CHLORIDE 50 MG: 9 INJECTION, SOLUTION INTRAVENOUS at 04:01

## 2017-01-21 RX ADMIN — SODIUM CHLORIDE, PRESERVATIVE FREE 3 ML: 5 INJECTION INTRAVENOUS at 05:01

## 2017-01-21 RX ADMIN — SODIUM CITRATE AND CITRIC ACID MONOHYDRATE 30 ML: 500; 334 SOLUTION ORAL at 05:01

## 2017-01-21 RX ADMIN — ASPIRIN 81 MG: 81 TABLET, COATED ORAL at 09:01

## 2017-01-21 RX ADMIN — ENOXAPARIN SODIUM 40 MG: 100 INJECTION SUBCUTANEOUS at 12:01

## 2017-01-21 RX ADMIN — MOXIFLOXACIN HYDROCHLORIDE 400 MG: 400 INJECTION, SOLUTION INTRAVENOUS at 12:01

## 2017-01-21 RX ADMIN — SODIUM CHLORIDE 50 MG: 9 INJECTION, SOLUTION INTRAVENOUS at 03:01

## 2017-01-21 RX ADMIN — SODIUM CHLORIDE, PRESERVATIVE FREE 3 ML: 5 INJECTION INTRAVENOUS at 01:01

## 2017-01-21 RX ADMIN — GUAIFENESIN 200 MG: 100 SOLUTION ORAL at 09:01

## 2017-01-21 RX ADMIN — SODIUM CITRATE AND CITRIC ACID MONOHYDRATE 30 ML: 500; 334 SOLUTION ORAL at 03:01

## 2017-01-21 RX ADMIN — METRONIDAZOLE 500 MG: 500 INJECTION, SOLUTION INTRAVENOUS at 01:01

## 2017-01-21 RX ADMIN — METRONIDAZOLE 500 MG: 500 INJECTION, SOLUTION INTRAVENOUS at 06:01

## 2017-01-21 RX ADMIN — GUAIFENESIN 200 MG: 100 SOLUTION ORAL at 05:01

## 2017-01-21 RX ADMIN — GUAIFENESIN 200 MG: 100 SOLUTION ORAL at 01:01

## 2017-01-21 NOTE — PLAN OF CARE
Problem: Patient Care Overview  Goal: Plan of Care Review  Sats 93% on 2L NC; tolerating well; CPT completed without incident; will continue to monitor.

## 2017-01-21 NOTE — PROGRESS NOTES
"[facility] Internal Medicine Resident HO-I Progress Note    Subjective:      Patient unable to answer questions due to mental status.     Objective:   Last 24 Hour Vital Signs:  BP  Min: 112/55  Max: 134/63  Temp  Av.9 °F (36.6 °C)  Min: 97.5 °F (36.4 °C)  Max: 98.2 °F (36.8 °C)  Pulse  Av.1  Min: 67  Max: 90  Resp  Av.9  Min: 17  Max: 24  SpO2  Av.7 %  Min: 91 %  Max: 96 %  Weight  Av.2 kg (104 lb)  Min: 47.2 kg (104 lb)  Max: 47.2 kg (104 lb)  I/O last 3 completed shifts:  In: 1190 [P.O.:240; IV Piggyback:950]  Out: 416 [Urine:353; Stool:63]    Physical Examination:    Vitals:    17 0800   BP: 124/71   Pulse: 82   Resp: 17   Temp: 98 °F (36.7 °C)     General:   Sleeping in bed, in no apparent distress  HENT:  NCAT, EOMI, MMM  Cardio:   Irregularly irregular rhythm, with normal S1 and S2; no murmurs or rubs  Resp:    CTAB; respirations unlabored; no wheezes, crackles or rhonchi  Abdom:   Soft, NTND with normoactive bowel sounds  Extrem:   Warm and well-perfused with no clubbing, cyanosis or edema  Pulses:   2+ and symmetric distally  Neuro:   Awoken to light touch, Response appropriately to "goodmorning", recognizes family members at bedside by name, moves all extremities spontaneously.    Laboratory:  Laboratory Data  Pertinent Findings:    Recent Labs  Lab 17  0555 17  0531 17  0444   WBC 11.96 16.30* 15.96*   HGB 8.2* 9.7* 9.3*   HCT 24.8* 28.6* 28.7*    444* 466*   MCV 84 85 85   RDW 15.6* 15.9* 16.1*    136 138   K 3.9 4.1 3.7    107 108   CO2 21* 22* 22*   BUN 15 13 10   CREATININE 0.8 0.7 0.7   * 127* 128*       Microbiology Data  Pertinent Findings:   blood cx NGTD    Other Results:  EKG (my interpretation): No new EKG    Radiology Data   Pertinent Findings (my interpretation):  - 17 CXR: near-complete opacification of the left hemithorax. Post obstructive vs pleural effusion.    Current Medications:     Infusions:        " Scheduled:   aspirin  81 mg Oral Daily    citric acid-sodium citrate 500-334 mg/5 ml  30 mL Oral TID    docusate sodium  100 mg Oral BID    enoxaparin  40 mg Subcutaneous Daily    guaifenesin 100 mg/5 ml  200 mg Oral Q4H    lacosamide (VIMPAT) IVPB  50 mg Intravenous Q12H    levothyroxine  75 mcg Oral Daily    metronidazole  500 mg Intravenous Q8H    moxifloxacin  400 mg Intravenous Q24H    polyethylene glycol  17 g Oral Daily    pravastatin  20 mg Oral Daily    senna  8.6 mg Oral Daily    sodium chloride 0.9%  3 mL Intravenous Q8H        PRN:  dextrose 50%, dextrose 50%, glucagon (human recombinant), glucose, glucose    Antibiotics and Day Number of Therapy:  Flagyl (1/17-current)  Moxifloxacin (1/16-current)    Lines and Day Number of Therapy:  PIV x1 22G    Assessment:     Sue Capone is a 89 y.o.female with  Patient Active Problem List    Diagnosis Date Noted    CAP (community acquired pneumonia)     Aspiration pneumonia of left lower lobe due to gastric secretions     Hyponatremia 01/16/2017    Generalized convulsive epilepsy with intractable epilepsy     Seizure 01/11/2017    Seizures 01/11/2017    Dementia without behavioral disturbance 09/01/2016    History of breast cancer 09/01/2016    Pre-diabetes 09/01/2016    Hypothyroidism 09/01/2016    Dyslipidemia 09/01/2016        Plan:        89 yo F w/ medical hx of Pneumonia and UTI discharged 1 day ago from ED on Levofloxacin, hypothyroidism, HLD, Breast CA w/ resection and radiation in 1995 with complaint of seizure x1 day presents with      Generalized Tonic Clonic Seizures  - DDx includes meningitis/encephalitis vs hyponatremia  - Tonic clonic seizures x2 witnessed, no loss bowel/bladder, first self resolved, seizure in ED resolved after Lorazepam 1mg IV  - AMS, fever and chills x2 days. Sent home from ED on 1/10/17 with Levofloxacin 750mg PO for pneumonia (CURB 65 =1)  - Initial vitals, afebrile 98.2F, HR 90, /74, SaO2 95%  RA  - At admission, WBC 15.2, afebrile. No nuchal rigidity.   - 1/12/17 Blood cx x2: no growth to date  - 1/11/17 LP results: Colorless, WBC 6, , Diff: 42% Neut, 53% Lymph, Glu 69, Prot 53  - 1/11/17 MRI Brain: Motion limited examination, no intracranial mass lesion or acute pathology, mild chronic ischemic changes  - 1/12/17 EEG: static encephalopathy without focal changes nor an epileptic process   - 1/11/17 HSV CSF PCR: Negative  - Consult Neurology (Christian): MRI neg, consider MRI w/ epilespy protocol. EEG encephalopathic, but no epileptiform activity noted. Continue Vimpat  - As per ID ([place]): Patient given Acyclovir IV for meningitis/encephalitis, due to elevated RBC and protein in CSF. Therapy complete, total 7 days.  - Decreased dose of Vimpat to 50mg IV q12 due to potentially sedating effects    Community Acquired Pneumonia  - 1/9/17 CXR: RLL consolidation  - At admission on exam, Lungs CTAB  - Patient has been afebrile.  - 1/17/17 WBC peaked at WBC 21. Currently, WBC wnl.  - 1/11/17 Procalcitonin <0.09  - 1/19/17 CXR: near-complete opacification of the left hemithorax. Post obstructive vs pleural effusion.  - Discontinued Vancomycin / Moxifloxacin (de-escalated from Vanc/Aztreonam/Bactrim IV)  - As per ID ([place]) recommendations: continue Moxifloxacin / Flagyl for now.       Paroxysmal Atrial Fibrilation  - New onset as of 1/18/17 EKG  - CHADSVASC = 3. As per Cardiology recommendations (Heriberto) Will hold anti-coagulation due to patient is fall risk.  - Cardiology remmendations: considerstarting diltiazem. Held diltiazem for Afib 2/2 hypotension  - repeat EKG after fluid rehydration showed junctional rhythm replacing afib.  - 1/19/17 EKG: Sinus rhythm, back to baseline     Hypotension-resolved  - Likely secondary to poor PO intake, BPs improved overnight after IVF rehydration   - Will continue to monitor, give IVF prn, encourage diet     Hypovolemic Hyponatremia-resolved  - 1/13/17 Na 122,  Urine Osm 500, Urine Na 72.  - Urine labs indicate possible SIADH vs RTA4. Ruled out hypothyroid (TSH/T4 wnl), [place]'s (AM cortisol wnl)  - 1/15/17 CT Chest: L main bronchus, LLL & ELIAS mucus plug vs endobronchial lesion. Post obstructive atelectasis vs consolidation.  - As per nephrology recommendations, continue free water restrict, PO salt tabs, Lasix IV. Monitor q6hr BMP. No indication for 3% saline at this time. If no clinical improvement, consider Vaptan  - Continue to monitor BMP, titrate salt tabs, fluid restrict, continue lasix pushes as per nephrology, consider administering Vaptan in ICU if sodium decreases.  - Today, Na 136. Continue to monitor    Somnolence  - Patient currently on lowest dose of vimpat possible  - Neuro recommending addition of modafinil 100mg daily  - Will consider adding modafinil if mental status worsens     Non-Anion Gap Metabolic Acidosis  - 1/14/17 HCO3- 15, HCO3- decreasing since admission  - Possibly RTA, consider urine studies  - Continue to monitor BMP      Normocytic Anemia  - At admission, H/H 11.2/33.5 MCV 83  - 1/11/17 Iron 44, Ferritin 115. B12 and Folate wnl  - Consider treatment with PO iron supplementation after acute illness.      Constipation  - As per family, no BM in 5 days  - In ED, patient disimpacted  - Continue miralax PRN, colace. Consider lactulose supp. May add/titrate meds as needed  - BM reported 1/12  - Continue colace, senna, miralax prn      Hypothyroidism  - 1/11/17 TSH: 2.219, FT4 1.18  - Continue home synthroid      HLD  - 1/3/17 Lipid Panel: TChol 153, LDL 71  - Continue home statin      DVT PPx: Lovenox  Diet: Pureed  Code: DNR  Social: Lives at home. PT/OT: family refuses HH PT/OT. DME hospital bed      Disposition: Pending improvement of mental status. Discuss goals of care with family.    Neptali Yepez MD  [facility] Internal Medicine HO-I  [facility] Internal Medicine Service Team B    [facility] Medicine Hospitalist Pager numbers:   [facility] Hospitalist Medicine Team A  (Benita/Elpidio): [phone]  [facility] Hospitalist Medicine Team B (Jolie/Jorge):  [phone]

## 2017-01-21 NOTE — NURSING
resp tech has performed naso trach suctioning   Prior to suctioning tech found oxygen level had declined on 2 liter to 82%    After advancement to 4 liters did not increase oxygen level over 92%  Placed on venti mask at 50%    Call placed to u internal medicine regarding pt condition

## 2017-01-21 NOTE — PROGRESS NOTES
Dr. Henderson notified of patient having history of DM and not on accuchecks and that order for restraints will  in 2 hours.

## 2017-01-21 NOTE — PLAN OF CARE
Pt with secretions. Per nurse, the pt has had this and it is not new finding. Will put in for NT suction

## 2017-01-21 NOTE — PLAN OF CARE
"Problem: Patient Care Overview  Goal: Plan of Care Review  Outcome: Ongoing (interventions implemented as appropriate)  AAOX self; patient states "hi" when told hello but does not talk when asked other questions; patient follows commands.  Respirations even and unlabored; breath sounds coarse with expiratory wheezes.  O2 2L NC in place with saturation 93%.  Crackles noted at base on throat; received order for NT suction prn.  Patient sounds less "wet".  Denies pain and n/v.  B UE +4 pitting edema and skin weeping on left side.  Elevating upper extremities on pillows.  Restraints in place to keep patient from removing iv access.  Turning patient frequently in bed to prevent further sacral breakdown; mepilex changed on sacrum d/t soiling.  B LE with heel life boots on.  Family hired sitter to sit at bedside to keep patient company.  Instructed to call for assistance.  Will cont to monitor.      "

## 2017-01-21 NOTE — NURSING
Suctioned back of throat with ji  Pt not able to bring forward secretions up in throat   Lungs coarse with brief clearing following any cough and attempt to suction back of throat   Call placed to md  Attained order for naso tracheal suction   Pt has drifted off to sleep now and family request postponement of any further suctioning for now

## 2017-01-21 NOTE — PROGRESS NOTES
Pt, was n/t suctioned with a fairly large amount of thick whitish secreations produced. Reflex when suctiomed. No adverse effects noticed.

## 2017-01-21 NOTE — PLAN OF CARE
Cross Coverage Note    Called by Irma Herrera RN re: patient's restraint orders about to ; noted history of DM2. Last A1c 6.4 in 2016. Will try Accuchecks QID (AC and HS).    Alyssa Henderson MD  [facility] Internal Medicine, [facility]  --  Team A Pager: [phone]  Team B Pager: [phone]

## 2017-01-22 LAB
ANION GAP SERPL CALC-SCNC: 8 MMOL/L
BACTERIA BLD CULT: NORMAL
BACTERIA BLD CULT: NORMAL
BASOPHILS # BLD AUTO: 0.28 K/UL
BASOPHILS NFR BLD: 1.6 %
BUN SERPL-MCNC: 9 MG/DL
CALCIUM SERPL-MCNC: 7.9 MG/DL
CHLORIDE SERPL-SCNC: 107 MMOL/L
CO2 SERPL-SCNC: 27 MMOL/L
CREAT SERPL-MCNC: 0.7 MG/DL
DIFFERENTIAL METHOD: ABNORMAL
EOSINOPHIL # BLD AUTO: 0.3 K/UL
EOSINOPHIL NFR BLD: 1.9 %
ERYTHROCYTE [DISTWIDTH] IN BLOOD BY AUTOMATED COUNT: 16.7 %
EST. GFR  (AFRICAN AMERICAN): >60 ML/MIN/1.73 M^2
EST. GFR  (NON AFRICAN AMERICAN): >60 ML/MIN/1.73 M^2
GLUCOSE SERPL-MCNC: 120 MG/DL
HCT VFR BLD AUTO: 29.8 %
HGB BLD-MCNC: 9.5 G/DL
LYMPHOCYTES # BLD AUTO: 1.9 K/UL
LYMPHOCYTES NFR BLD: 11 %
MCH RBC QN AUTO: 27.6 PG
MCHC RBC AUTO-ENTMCNC: 31.9 %
MCV RBC AUTO: 87 FL
MONOCYTES # BLD AUTO: 1.8 K/UL
MONOCYTES NFR BLD: 10.6 %
NEUTROPHILS # BLD AUTO: 13 K/UL
NEUTROPHILS NFR BLD: 74.9 %
PLATELET # BLD AUTO: 352 K/UL
PMV BLD AUTO: 11.6 FL
POCT GLUCOSE: 140 MG/DL (ref 70–110)
POCT GLUCOSE: 162 MG/DL (ref 70–110)
POCT GLUCOSE: 162 MG/DL (ref 70–110)
POTASSIUM SERPL-SCNC: 3.8 MMOL/L
RBC # BLD AUTO: 3.44 M/UL
SODIUM SERPL-SCNC: 142 MMOL/L
WBC # BLD AUTO: 17.38 K/UL

## 2017-01-22 PROCEDURE — 63600175 PHARM REV CODE 636 W HCPCS: Performed by: INTERNAL MEDICINE

## 2017-01-22 PROCEDURE — 25000003 PHARM REV CODE 250: Performed by: STUDENT IN AN ORGANIZED HEALTH CARE EDUCATION/TRAINING PROGRAM

## 2017-01-22 PROCEDURE — 25000003 PHARM REV CODE 250: Performed by: INTERNAL MEDICINE

## 2017-01-22 PROCEDURE — 80048 BASIC METABOLIC PNL TOTAL CA: CPT

## 2017-01-22 PROCEDURE — C9254 INJECTION, LACOSAMIDE: HCPCS | Performed by: INTERNAL MEDICINE

## 2017-01-22 PROCEDURE — 11000001 HC ACUTE MED/SURG PRIVATE ROOM

## 2017-01-22 PROCEDURE — 36415 COLL VENOUS BLD VENIPUNCTURE: CPT

## 2017-01-22 PROCEDURE — 27000221 HC OXYGEN, UP TO 24 HOURS

## 2017-01-22 PROCEDURE — 94761 N-INVAS EAR/PLS OXIMETRY MLT: CPT

## 2017-01-22 PROCEDURE — 85025 COMPLETE CBC W/AUTO DIFF WBC: CPT

## 2017-01-22 PROCEDURE — 94668 MNPJ CHEST WALL SBSQ: CPT

## 2017-01-22 RX ORDER — GLYCOPYRROLATE 1 MG/1
1 TABLET ORAL 3 TIMES DAILY
Status: DISCONTINUED | OUTPATIENT
Start: 2017-01-22 | End: 2017-01-23 | Stop reason: HOSPADM

## 2017-01-22 RX ADMIN — SODIUM CITRATE AND CITRIC ACID MONOHYDRATE 30 ML: 500; 334 SOLUTION ORAL at 06:01

## 2017-01-22 RX ADMIN — SODIUM CITRATE AND CITRIC ACID MONOHYDRATE 30 ML: 500; 334 SOLUTION ORAL at 01:01

## 2017-01-22 RX ADMIN — PRAVASTATIN SODIUM 20 MG: 20 TABLET ORAL at 10:01

## 2017-01-22 RX ADMIN — GLYCOPYRROLATE 1 MG: 1 TABLET ORAL at 01:01

## 2017-01-22 RX ADMIN — SODIUM CHLORIDE, PRESERVATIVE FREE 3 ML: 5 INJECTION INTRAVENOUS at 10:01

## 2017-01-22 RX ADMIN — GUAIFENESIN 200 MG: 100 SOLUTION ORAL at 01:01

## 2017-01-22 RX ADMIN — MOXIFLOXACIN HYDROCHLORIDE 400 MG: 400 INJECTION, SOLUTION INTRAVENOUS at 01:01

## 2017-01-22 RX ADMIN — SODIUM CITRATE AND CITRIC ACID MONOHYDRATE 30 ML: 500; 334 SOLUTION ORAL at 10:01

## 2017-01-22 RX ADMIN — SODIUM CHLORIDE 50 MG: 9 INJECTION, SOLUTION INTRAVENOUS at 04:01

## 2017-01-22 RX ADMIN — GUAIFENESIN 200 MG: 100 SOLUTION ORAL at 07:01

## 2017-01-22 RX ADMIN — GUAIFENESIN 200 MG: 100 SOLUTION ORAL at 10:01

## 2017-01-22 RX ADMIN — SODIUM CHLORIDE, PRESERVATIVE FREE 3 ML: 5 INJECTION INTRAVENOUS at 01:01

## 2017-01-22 RX ADMIN — ENOXAPARIN SODIUM 40 MG: 100 INJECTION SUBCUTANEOUS at 01:01

## 2017-01-22 RX ADMIN — SODIUM CHLORIDE, PRESERVATIVE FREE 3 ML: 5 INJECTION INTRAVENOUS at 04:01

## 2017-01-22 RX ADMIN — METRONIDAZOLE 500 MG: 500 INJECTION, SOLUTION INTRAVENOUS at 06:01

## 2017-01-22 RX ADMIN — METRONIDAZOLE 500 MG: 500 INJECTION, SOLUTION INTRAVENOUS at 10:01

## 2017-01-22 RX ADMIN — GLYCOPYRROLATE 1 MG: 1 TABLET ORAL at 10:01

## 2017-01-22 RX ADMIN — SODIUM CHLORIDE, PRESERVATIVE FREE 3 ML: 5 INJECTION INTRAVENOUS at 02:01

## 2017-01-22 RX ADMIN — METRONIDAZOLE 500 MG: 500 INJECTION, SOLUTION INTRAVENOUS at 01:01

## 2017-01-22 RX ADMIN — SODIUM CHLORIDE 50 MG: 9 INJECTION, SOLUTION INTRAVENOUS at 05:01

## 2017-01-22 RX ADMIN — LEVOTHYROXINE SODIUM 75 MCG: 75 TABLET ORAL at 10:01

## 2017-01-22 RX ADMIN — ASPIRIN 81 MG 81 MG: 81 TABLET ORAL at 09:01

## 2017-01-22 RX ADMIN — GUAIFENESIN 200 MG: 100 SOLUTION ORAL at 06:01

## 2017-01-22 NOTE — PLAN OF CARE
"Problem: Patient Care Overview  Goal: Plan of Care Review  Outcome: Ongoing (interventions implemented as appropriate)  AAOX self; patient states "fine" when asked how she is but does not talk when asked other questions; patient follows commands and responds to questions with head nodding or shaking. Respirations even and unlabored; breath sounds coarse throughout. O2 30% rosangela mask in place with saturation 98%.  Denies pain and n/v. B UE +4 pitting edema and skin weeping. Elevating upper extremities on pillows.  Turning patient frequently in bed to prevent further sacral breakdown and using wedge; mepilex changed on sacrum d/t soiling. B LE with heel lift boots on. Sitter, hired by patient's family, at bedside to keep patient company. Instructed to call for assistance. Will cont to monitor.      "

## 2017-01-22 NOTE — PROGRESS NOTES
"[facility] Internal Medicine Resident [facility]arry[facility] Progress Note    Subjective:      Patient "feels sick," this AM. Still on ventimask. Unable to perform ROS as pt didn't answer most questions and poor hearing.     Objective:   Last 24 Hour Vital Signs:  BP  Min: 122/72  Max: 150/67  Temp  Av.8 °F (36.6 °C)  Min: 97 °F (36.1 °C)  Max: 98.6 °F (37 °C)  Pulse  Av.3  Min: 67  Max: 112  Resp  Av.6  Min: 17  Max: 20  SpO2  Av.9 %  Min: 82 %  Max: 98 %  I/O last 3 completed shifts:  In: 1300 [IV Piggyback:1300]  Out: 200 [Urine:200]    Physical Examination:    Vitals:    17 0500   BP:    Pulse: 82   Resp:    Temp:      General:   Sleeping in bed, in no apparent distress  HENT:  NCAT, EOMI, MMM  Cardio:   Irregularly irregular rhythm, with normal S1 and S2; no murmurs or rubs  Resp:    Diffuse rhonchi ant lung fields  Abdom:   Soft, NTND with normoactive bowel sounds  Extrem:   Warm and well-perfused with no clubbing, cyanosis or edema  Pulses:   2+ and symmetric distally  Neuro:   Awoken to light touch    Laboratory:  Laboratory Data  Pertinent Findings:    Recent Labs  Lab 17  0555 17  0531 17  0444   WBC 11.96 16.30* 15.96*   HGB 8.2* 9.7* 9.3*   HCT 24.8* 28.6* 28.7*    444* 466*   MCV 84 85 85   RDW 15.6* 15.9* 16.1*    136 138   K 3.9 4.1 3.7    107 108   CO2 21* 22* 22*   BUN 15 13 10   CREATININE 0.8 0.7 0.7   * 127* 128*       Microbiology Data  Pertinent Findings:   blood cx NGTD    Other Results:  EKG (my interpretation): No new EKG    Radiology Data   Pertinent Findings (my interpretation):  - 17 CXR: near-complete opacification of the left hemithorax. Post obstructive vs pleural effusion.    Current Medications:     Infusions:        Scheduled:   aspirin  81 mg Oral Daily    citric acid-sodium citrate 500-334 mg/5 ml  30 mL Oral TID    docusate sodium  100 mg Oral BID    enoxaparin  40 mg Subcutaneous Daily    guaifenesin 100 mg/5 ml  200 mg " Oral Q4H    lacosamide (VIMPAT) IVPB  50 mg Intravenous Q12H    levothyroxine  75 mcg Oral Daily    metronidazole  500 mg Intravenous Q8H    moxifloxacin  400 mg Intravenous Q24H    polyethylene glycol  17 g Oral Daily    pravastatin  20 mg Oral Daily    senna  8.6 mg Oral Daily    sodium chloride 0.9%  3 mL Intravenous Q8H        PRN:  dextrose 50%, dextrose 50%, glucagon (human recombinant), glucose, glucose    Antibiotics and Day Number of Therapy:  Flagyl (1/17-current)  Moxifloxacin (1/16-current)    Lines and Day Number of Therapy:  PIV x1 22G    Assessment:     Sue Capone is a 89 y.o.female with  Patient Active Problem List    Diagnosis Date Noted    CAP (community acquired pneumonia)     Aspiration pneumonia of left lower lobe due to gastric secretions     Hyponatremia 01/16/2017    Generalized convulsive epilepsy with intractable epilepsy     Seizure 01/11/2017    Seizures 01/11/2017    Dementia without behavioral disturbance 09/01/2016    History of breast cancer 09/01/2016    Pre-diabetes 09/01/2016    Hypothyroidism 09/01/2016    Dyslipidemia 09/01/2016        Plan:        89 yo F w/ medical hx of Pneumonia and UTI discharged 1 day ago from ED on Levofloxacin, hypothyroidism, HLD, Breast CA w/ resection and radiation in 1995 with complaint of seizure x1 day presents with      Generalized Tonic Clonic Seizures  - DDx includes meningitis/encephalitis vs hyponatremia  - Tonic clonic seizures x2 witnessed, no loss bowel/bladder, first self resolved, seizure in ED resolved after Lorazepam 1mg IV  - AMS, fever and chills x2 days. Sent home from ED on 1/10/17 with Levofloxacin 750mg PO for pneumonia (CURB 65 =1)  - Initial vitals, afebrile 98.2F, HR 90, /74, SaO2 95% RA  - At admission, WBC 15.2, afebrile. No nuchal rigidity.   - 1/12/17 Blood cx x2: no growth to date  - 1/11/17 LP results: Colorless, WBC 6, , Diff: 42% Neut, 53% Lymph, Glu 69, Prot 53  - 1/11/17 MRI Brain:  Motion limited examination, no intracranial mass lesion or acute pathology, mild chronic ischemic changes  - 1/12/17 EEG: static encephalopathy without focal changes nor an epileptic process   - 1/11/17 HSV CSF PCR: Negative  - Consult Neurology (Christian): MRI neg, consider MRI w/ epilespy protocol. EEG encephalopathic, but no epileptiform activity noted. Continue Vimpat  - As per ID ([place]): Patient given Acyclovir IV for meningitis/encephalitis, due to elevated RBC and protein in CSF. Therapy complete, total 7 days.  - Decreased dose of Vimpat to 50mg IV q12 due to potentially sedating effects    CAP/aspiration PNA  - 1/9/17 CXR: RLL consolidation  - At admission on exam, Lungs CTAB  - Patient has been afebrile.  - 1/17/17 WBC peaked at WBC 21. Currently, WBC wnl.  - 1/11/17 Procalcitonin <0.09  - 1/19/17 CXR: near-complete opacification of the left hemithorax. Post obstructive vs pleural effusion.  - Discontinued Vancomycin / Moxifloxacin (de-escalated from Vanc/Aztreonam/Bactrim IV)  - As per ID ([place]) recommendations: continue Moxifloxacin / Flagyl   -Pt with choking episode yesterday. CXR with more opacification on L, R lung process developing. Likely another episode of aspiration. No further abx management as already covered.   -This AM WBC pending, afebrile      Paroxysmal Atrial Fibrilation  - New onset as of 1/18/17 EKG  - CHADSVASC = 3. As per Cardiology recommendations (Heriberto) Will hold anti-coagulation due to patient is fall risk.  - Cardiology remmendations: considerstarting diltiazem. Held diltiazem for Afib 2/2 hypotension  - repeat EKG after fluid rehydration showed junctional rhythm replacing afib.  - 1/19/17 EKG: Sinus rhythm, back to baseline     Hypotension-resolved  - Likely secondary to poor PO intake, BPs improved overnight after IVF rehydration   - Will continue to monitor, give IVF prn, encourage diet     Hypovolemic Hyponatremia-resolved  - 1/13/17 Na 122, Urine Osm 500, Urine Na  72.  - Urine labs indicate possible SIADH vs RTA4. Ruled out hypothyroid (TSH/T4 wnl), [place]'s (AM cortisol wnl)  - 1/15/17 CT Chest: L main bronchus, LLL & ELIAS mucus plug vs endobronchial lesion. Post obstructive atelectasis vs consolidation.  - As per nephrology recommendations, continue free water restrict, PO salt tabs, Lasix IV. Monitor q6hr BMP. No indication for 3% saline at this time. If no clinical improvement, consider Vaptan  - Continue to monitor BMP, titrate salt tabs, fluid restrict, continue lasix pushes as per nephrology, consider administering Vaptan in ICU if sodium decreases.  - Today, Na 138. Continue to monitor    Somnolence  - Patient currently on lowest dose of vimpat possible  - Neuro recommending addition of modafinil 100mg daily  - Will consider adding modafinil if mental status worsens     Non-Anion Gap Metabolic Acidosis (resolved)  - 1/14/17 HCO3- 15, HCO3- decreasing since admission  - Possibly RTA, consider urine studies  - Continue to monitor BMP       Normocytic Anemia  - At admission, H/H 11.2/33.5 MCV 83  - 1/11/17 Iron 44, Ferritin 115. B12 and Folate wnl  - Consider treatment with PO iron supplementation after acute illness.      Constipation  - As per family, no BM in 5 days  - In ED, patient disimpacted  - Continue miralax PRN, colace. Consider lactulose supp. May add/titrate meds as needed  - BM reported 1/12  - Continue colace, senna, miralax prn      Hypothyroidism  - 1/11/17 TSH: 2.219, FT4 1.18  - Continue home synthroid      HLD  - 1/3/17 Lipid Panel: TChol 153, LDL 71  - Continue home statin      DVT PPx: Lovenox  Diet: Pureed  Code: DNR  Social: Lives at home. PT/OT: family refuses HH PT/OT. DME hospital bed      Disposition: Pending improvement of mental status. Discuss goals of care with family.    Harley Laird MD  [facility] Internal Medicine HO-I  [facility] Internal Medicine Service Team B    [facility] Medicine Hospitalist Pager numbers:   [facility] Hospitalist Medicine Team A  (Benita/Elpidio): [phone]  [facility] Hospitalist Medicine Team B (Jolie/Jorge):  [phone]

## 2017-01-22 NOTE — PLAN OF CARE
Problem: Patient Care Overview  Goal: Plan of Care Review  resp effort easier at shift end  Family here    Head of bed maintained elevated 30 -45 degrees during shift with higher elevation for meals    Lungs remain coarse throughout    Repositioned every 2-3 hours  Keeping arms elevated  Noted approx 4 pm right arm edema has increased  No blood return noted to right arm access  New access placed to left forearm after one attempt  Afebrile   Laxatives and stool softener held due very soft stools prior to 9 am med    No sign of increased coughing when meds given or meals    Aspiration precautions continued       Restraints left untied for trial period starting at 9 am   No evidence of attempt to remove medical devices.  Restraints left off for rest of the shift.    General edema noted in addition to plus 4 to upper ext right arm greater than left   Remains on venti mask following episode of increased dyspnea and decreased oxygen saturation    TB SKIN TEST NEGATIVE AT 24 HOURS  NO SIGNS OF INDURATION

## 2017-01-22 NOTE — PLAN OF CARE
Problem: Patient Care Overview  Goal: Plan of Care Review  Outcome: Ongoing (interventions implemented as appropriate)  Pt on documented O2, mild respiratory distress noted. Will continue to monitor.

## 2017-01-23 VITALS
OXYGEN SATURATION: 99 % | WEIGHT: 104 LBS | TEMPERATURE: 98 F | BODY MASS INDEX: 19.14 KG/M2 | HEART RATE: 141 BPM | DIASTOLIC BLOOD PRESSURE: 60 MMHG | RESPIRATION RATE: 16 BRPM | HEIGHT: 62 IN | SYSTOLIC BLOOD PRESSURE: 98 MMHG

## 2017-01-23 LAB
L PNEUMO AG UR QL IA: NOT DETECTED
POCT GLUCOSE: 152 MG/DL (ref 70–110)
TB INDURATION 48 - 72 HR READ: 0 MM

## 2017-01-23 PROCEDURE — 63600175 PHARM REV CODE 636 W HCPCS: Performed by: INTERNAL MEDICINE

## 2017-01-23 PROCEDURE — 25000003 PHARM REV CODE 250: Performed by: INTERNAL MEDICINE

## 2017-01-23 PROCEDURE — 94668 MNPJ CHEST WALL SBSQ: CPT

## 2017-01-23 PROCEDURE — 25000003 PHARM REV CODE 250: Performed by: STUDENT IN AN ORGANIZED HEALTH CARE EDUCATION/TRAINING PROGRAM

## 2017-01-23 PROCEDURE — 31720 CLEARANCE OF AIRWAYS: CPT

## 2017-01-23 PROCEDURE — 27000221 HC OXYGEN, UP TO 24 HOURS

## 2017-01-23 PROCEDURE — 94761 N-INVAS EAR/PLS OXIMETRY MLT: CPT

## 2017-01-23 PROCEDURE — C9254 INJECTION, LACOSAMIDE: HCPCS | Performed by: INTERNAL MEDICINE

## 2017-01-23 RX ORDER — ENOXAPARIN SODIUM 100 MG/ML
40 INJECTION SUBCUTANEOUS EVERY 12 HOURS
Start: 2017-01-23 | End: 2017-01-23 | Stop reason: HOSPADM

## 2017-01-23 RX ORDER — MOXIFLOXACIN HYDROCHLORIDE 400 MG/1
400 TABLET ORAL DAILY
Qty: 7 TABLET | Refills: 0
Start: 2017-01-23 | End: 2017-01-30

## 2017-01-23 RX ORDER — METRONIDAZOLE 500 MG/1
500 TABLET ORAL EVERY 8 HOURS
Status: DISCONTINUED | OUTPATIENT
Start: 2017-01-23 | End: 2017-01-23 | Stop reason: HOSPADM

## 2017-01-23 RX ORDER — LACOSAMIDE 50 MG/1
50 TABLET ORAL EVERY 12 HOURS
Status: DISCONTINUED | OUTPATIENT
Start: 2017-01-23 | End: 2017-01-23 | Stop reason: HOSPADM

## 2017-01-23 RX ORDER — METRONIDAZOLE 500 MG/1
500 TABLET ORAL EVERY 8 HOURS
Qty: 21 TABLET | Refills: 0
Start: 2017-01-23 | End: 2017-01-30

## 2017-01-23 RX ORDER — POLYETHYLENE GLYCOL 3350 17 G/17G
17 POWDER, FOR SOLUTION ORAL DAILY
Refills: 0
Start: 2017-01-23

## 2017-01-23 RX ORDER — DOCUSATE SODIUM 100 MG/1
100 CAPSULE, LIQUID FILLED ORAL 2 TIMES DAILY
Refills: 0
Start: 2017-01-23

## 2017-01-23 RX ORDER — MOXIFLOXACIN HYDROCHLORIDE 400 MG/1
400 TABLET ORAL DAILY
Status: DISCONTINUED | OUTPATIENT
Start: 2017-01-23 | End: 2017-01-23 | Stop reason: HOSPADM

## 2017-01-23 RX ORDER — SENNOSIDES 8.6 MG/1
1 TABLET ORAL DAILY
Start: 2017-01-23

## 2017-01-23 RX ORDER — GUAIFENESIN 100 MG/5ML
200 SOLUTION ORAL EVERY 4 HOURS
Refills: 0
Start: 2017-01-23 | End: 2017-02-02

## 2017-01-23 RX ORDER — GLYCOPYRROLATE 1 MG/1
1 TABLET ORAL 3 TIMES DAILY
Qty: 90 TABLET | Refills: 0
Start: 2017-01-23 | End: 2017-02-22

## 2017-01-23 RX ADMIN — GLYCOPYRROLATE 1 MG: 1 TABLET ORAL at 05:01

## 2017-01-23 RX ADMIN — SODIUM CHLORIDE, PRESERVATIVE FREE 3 ML: 5 INJECTION INTRAVENOUS at 05:01

## 2017-01-23 RX ADMIN — METRONIDAZOLE 500 MG: 500 TABLET ORAL at 02:01

## 2017-01-23 RX ADMIN — LEVOTHYROXINE SODIUM 75 MCG: 75 TABLET ORAL at 10:01

## 2017-01-23 RX ADMIN — METRONIDAZOLE 500 MG: 500 INJECTION, SOLUTION INTRAVENOUS at 01:01

## 2017-01-23 RX ADMIN — GUAIFENESIN 200 MG: 100 SOLUTION ORAL at 05:01

## 2017-01-23 RX ADMIN — SODIUM CHLORIDE 50 MG: 9 INJECTION, SOLUTION INTRAVENOUS at 05:01

## 2017-01-23 RX ADMIN — GLYCOPYRROLATE 1 MG: 1 TABLET ORAL at 03:01

## 2017-01-23 RX ADMIN — ASPIRIN 81 MG 81 MG: 81 TABLET ORAL at 10:01

## 2017-01-23 RX ADMIN — SODIUM CITRATE AND CITRIC ACID MONOHYDRATE 30 ML: 500; 334 SOLUTION ORAL at 05:01

## 2017-01-23 RX ADMIN — PRAVASTATIN SODIUM 20 MG: 20 TABLET ORAL at 10:01

## 2017-01-23 RX ADMIN — SENNOSIDES 8.6 MG: 8.6 TABLET ORAL at 10:01

## 2017-01-23 RX ADMIN — GUAIFENESIN 200 MG: 100 SOLUTION ORAL at 11:01

## 2017-01-23 RX ADMIN — MOXIFLOXACIN HYDROCHLORIDE 400 MG: 400 TABLET, FILM COATED ORAL at 10:01

## 2017-01-23 RX ADMIN — ENOXAPARIN SODIUM 40 MG: 100 INJECTION SUBCUTANEOUS at 11:01

## 2017-01-23 RX ADMIN — SODIUM CITRATE AND CITRIC ACID MONOHYDRATE 30 ML: 500; 334 SOLUTION ORAL at 02:01

## 2017-01-23 NOTE — PROGRESS NOTES
.Pharmacy New Medication Education    Patient accepted medication education.    Pharmacy educated patient on the following medications, using the teach-back method.   Flagyl  avelox      Learners of pharmacy medication education included:  patient    Patient +/- learner response:  verbalize understanding

## 2017-01-23 NOTE — PLAN OF CARE
Problem: Patient Care Overview  Goal: Plan of Care Review  Pt received on nasal cannula at 4  lpm. SPO2 72 %. Pt sx'd and got large amount of thin pale yellow/ white sputum. Changed O2 to 100% NRM and sats increased to 95%. Will continue to monitor.

## 2017-01-23 NOTE — PLAN OF CARE
Problem: Patient Care Overview  Goal: Plan of Care Review  Outcome: Ongoing (interventions implemented as appropriate)  Pt weaned to oxygen on 3 liter per resp at shift end     Back of throat suctioned for scant secretions on rounds    Little to no oral intake   Swallowed crushed and liquid meds with thickener.   Dressing to skin tears to fingers and left arm done    Bilateral arm edema persist but not as severe as  yesterday   Repositioned often every 2 to 4 hours  Head of bed kept elevated  Aspiration precautions maintained   Laxatives held due to unformed pasty stool   Pt  Made no efforts to remove medical devices   Pt able to recognize family members    Heel/foot protectors maintained   Air mattress overlay present     Telemetry showing sinus rhythm this shift    Family has voiced interest in in pt hospice and doctors are aware.

## 2017-01-23 NOTE — PLAN OF CARE
Problem: Patient Care Overview  Goal: Plan of Care Review  Received pt on 5L NC; Sats 100%; decreased to 2L NC: Sats 96%; tolerating well; CPT completed without incident; will continue to monitor.

## 2017-01-23 NOTE — PROGRESS NOTES
[facility] Internal Medicine Resident ALISE Progress Note    Subjective:      Patient unable to answer questions due to mental status and hearing difficulties.     Objective:   Last 24 Hour Vital Signs:  BP  Min: 118/60  Max: 166/71  Temp  Av.6 °F (36.4 °C)  Min: 97.1 °F (36.2 °C)  Max: 98.2 °F (36.8 °C)  Pulse  Av.6  Min: 75  Max: 90  Resp  Av.1  Min: 16  Max: 20  SpO2  Av.6 %  Min: 87 %  Max: 100 %  I/O last 3 completed shifts:  In: 1160 [P.O.:210; IV Piggyback:950]  Out: 794 [Urine:566; Stool:228]    Physical Examination:    General:   Sleeping in bed, in no apparent distress  HENT:   NCAT, EOMI, MMM  Cardio:   Irregularly irregular rhythm, with normal S1 and S2; no murmurs or rubs  Resp:    Diffuse rhonchi ant lung fields  Abdom:   Soft, NTND with normoactive bowel sounds  Extrem:   Warm and well-perfused with no clubbing, cyanosis or edema  Pulses:   2+ and symmetric distally  Neuro:    Awoken to light touch    Laboratory:  Laboratory Data  Pertinent Findings:  Recent Labs      17   0523   WBC  15.96*  17.38*   HGB  9.3*  9.5*   HCT  28.7*  29.8*   PLT  466*  352*   MCV  85  87   RDW  16.1*  16.7*   GRAN  76.0*  12.0*  74.9*  13.0*   LYMPH  11.0*  1.8  11.0*  1.9   MONO  10.8  1.7*  10.6  1.8*   EOS  0.2  0.3   BASO  0.16  0.28*   EOSINOPHIL  1.2  1.9   BASOPHIL  1.0  1.6       Recent Labs      17   0522   NA  138  142   K  3.7  3.8   CL  108  107   CO2  22*  27   BUN  10  9*   CREATININE  0.7  0.7     Recent Labs      17   0539  17   1128  17   0530   POCTGLUCOSE  150*  140*  162*  162*  152*     No results for input(s): PROT, ALBUMIN, BILITOT, AST, ALT, ALKPHOS in the last 72 hours.    Microbiology Data  Pertinent Findings:  No new data    Other Results:  EKG (my interpretation): No new data    Radiology Data   Pertinent Findings (my interpretation):  No new data    Current Medications:      Infusions:        Scheduled:   aspirin  81 mg Oral Daily    citric acid-sodium citrate 500-334 mg/5 ml  30 mL Oral TID    docusate sodium  100 mg Oral BID    enoxaparin  40 mg Subcutaneous Daily    glycopyrrolate  1 mg Oral TID    guaifenesin 100 mg/5 ml  200 mg Oral Q4H    lacosamide (VIMPAT) IVPB  50 mg Intravenous Q12H    levothyroxine  75 mcg Oral Daily    metronidazole  500 mg Intravenous Q8H    moxifloxacin  400 mg Intravenous Q24H    polyethylene glycol  17 g Oral Daily    pravastatin  20 mg Oral Daily    senna  8.6 mg Oral Daily    sodium chloride 0.9%  3 mL Intravenous Q8H        PRN:  dextrose 50%, dextrose 50%, glucagon (human recombinant), glucose, glucose    Antibiotics and Day Number of Therapy:  Metronidazole 500mg IV Day 7  Moxifloxacin 400mg IV q24hr Day 9    Lines and Day Number of Therapy:  None    Assessment:     Sue Capone is a 89 y.o.female with  Patient Active Problem List    Diagnosis Date Noted    CAP (community acquired pneumonia)     Aspiration pneumonia of left lower lobe due to gastric secretions     Hyponatremia 01/16/2017    Generalized convulsive epilepsy with intractable epilepsy     Seizure 01/11/2017    Seizures 01/11/2017    Dementia without behavioral disturbance 09/01/2016    History of breast cancer 09/01/2016    Pre-diabetes 09/01/2016    Hypothyroidism 09/01/2016    Dyslipidemia 09/01/2016        Plan:     89 yo F w/ medical hx of Pneumonia and UTI discharged 1 day ago from ED on Levofloxacin, hypothyroidism, HLD, Breast CA w/ resection and radiation in 1995 with complaint of seizure x1 day presents with    Aspiration PNA  - At admission on exam, Lungs CTAB  - Patient has been afebrile.  - 1/17/17 WBC peaked at WBC 21. Currently, WBC wnl.  - 1/11/17 Procalcitonin <0.09  - Pt with choking episode 1/18/17. Likely another episode of aspiration.   - 1/19/17 CXR: near-complete opacification of the left hemithorax. Post obstructive vs pleural  effusion.  - Discontinued Vancomycin / Moxifloxacin (de-escalated from Vanc/Aztreonam/Bactrim IV)  - As per ID ([place]) recommendations: continue Moxifloxacin / Flagyl  - As per family, consider comfort care, consider hospice. Continue PO Moxifloxacin / Flagyl. Hold all labs for now.       Generalized Tonic Clonic Seizures  - DDx includes meningitis/encephalitis vs hyponatremia  - Tonic clonic seizures x2 witnessed, no loss bowel/bladder, first self resolved, seizure in ED resolved after Lorazepam 1mg IV  - AMS, fever and chills x2 days. Sent home from ED on 1/10/17 with Levofloxacin 750mg PO for pneumonia (CURB 65 =1)  - Initial vitals, afebrile 98.2F, HR 90, /74, SaO2 95% RA  - At admission, WBC 15.2, afebrile. No nuchal rigidity.   - 1/12/17 Blood cx x2: no growth to date  - 1/11/17 LP results: Colorless, WBC 6, , Diff: 42% Neut, 53% Lymph, Glu 69, Prot 53  - 1/11/17 MRI Brain: Motion limited examination, no intracranial mass lesion or acute pathology, mild chronic ischemic changes  - 1/12/17 EEG: static encephalopathy without focal changes nor an epileptic process   - 1/11/17 HSV CSF PCR: Negative  - Consult Neurology (Christian): MRI neg, consider MRI w/ epilespy protocol. EEG encephalopathic, but no epileptiform activity noted. Continue Vimpat  - As per ID ([place]): Patient given Acyclovir IV for meningitis/encephalitis, due to elevated RBC and protein in CSF. Therapy complete, total 7 days.  - Decreased dose of Vimpat to 50mg q12 due to sedating side effects. Will continue PO formulation.       Paroxysmal Atrial Fibrilation  - New onset as of 1/18/17 EKG  - CHADSVASC = 3. As per Cardiology recommendations (Heriberto) Will hold anti-coagulation due to patient is fall risk.  - Cardiology remmendations: considerstarting diltiazem. Held diltiazem for Afib 2/2 hypotension  - repeat EKG after fluid rehydration showed junctional rhythm replacing afib.  - 1/19/17 EKG: Sinus rhythm, back to  baseline      Hypotension-resolved  - Likely secondary to poor PO intake, BPs improved overnight after IVF rehydration   - Will continue to monitor, give IVF prn, encourage diet      Hypovolemic Hyponatremia-resolved  - 1/13/17 Na 122, Urine Osm 500, Urine Na 72.  - Urine labs indicate possible SIADH vs RTA4. Ruled out hypothyroid (TSH/T4 wnl), [place]'s (AM cortisol wnl)  - 1/15/17 CT Chest: L main bronchus, LLL & ELIAS mucus plug vs endobronchial lesion. Post obstructive atelectasis vs consolidation.  - As per nephrology recommendations, continue free water restrict, PO salt tabs, Lasix IV. Monitor q6hr BMP. No indication for 3% saline at this time. If no clinical improvement, consider Vaptan  - Continue to monitor BMP, titrate salt tabs, fluid restrict, continue lasix pushes as per nephrology, consider administering Vaptan in ICU if sodium decreases.  - Today, Na 138. Continue to monitor     Somnolence  - Patient currently on lowest dose of vimpat possible  - Neuro recommending addition of modafinil 100mg daily  - Will consider adding modafinil if mental status worsens      Non-Anion Gap Metabolic Acidosis (resolved)  - 1/14/17 HCO3- 15, resolved 1/22/17 HCO3- 27  - Stop labs due to comfort care      Normocytic Anemia  - At admission, H/H 11.2/33.5 MCV 83  - 1/11/17 Iron 44, Ferritin 115. B12 and Folate wnl  - Consider treatment with PO iron supplementation after acute illness.      Constipation  - As per family, no BM in 5 days  - In ED, patient disimpacted  - Continue miralax PRN, colace. Consider lactulose supp. May add/titrate meds as needed  - BM reported 1/12  - Continue colace, senna, miralax prn      Hypothyroidism  - 1/11/17 TSH: 2.219, FT4 1.18  - Continue home synthroid      HLD  - 1/3/17 Lipid Panel: TChol 153, LDL 71  - Continue home statin      DVT PPx: Lovenox  Diet: Pureed  Code: DNR  Social: Lives at home. PT/OT: family refuses HH PT/OT. DME hospital bed      Disposition: Pending improvement of  mental status. Discuss goals of care with family.    Ming Bai  [facility] Internal Medicine HO-I  [facility] Internal Medicine Service Team B    [facility] Medicine Hospitalist Pager numbers:   [facility] Hospitalist Medicine Team A (Benita/Elpidio): [phone]  [facility] Hospitalist Medicine Team B (Jolie/Jorge):  [phone]

## 2017-01-23 NOTE — PLAN OF CARE
TN met with pt's son, daughter in law and grandson today   for transfer to [facility] hospice per ambulance transport - on 100% NRB     est  by 5pm        01/23/17 1629   Final Note   Assessment Type Final Discharge Note   Discharge Disposition HospiceMedic   Discharge planning education complete? Yes   Hospital Follow Up  Appt(s) scheduled? No   Discharge plans and expectations educations in teach back method with documentation complete? No   Offered [facility] -- Bedside Delivery? n/a   Discharge/Hospital Encounter Summary to (non-[facility]) PCP No   Referral to Outpatient Case Management complete? No   Referral to / orders for Home Health Complete? n/a   30 day supply of medicines given at discharge, if documented non-compliance / non-adherence? n/a   Any social issues identified prior to discharge? n/a   Did you assess the readiness or willingness of the family or caregiver to support self management of care? Yes

## 2017-01-23 NOTE — PROVIDER TRANSFER
[facility]    FACILITY TRANSFER ORDERS      Patient Name: Sue Capone  YOB: 1922    PCP: Collins Willams MD   PCP Address: [address] [place] [address] / [place] [id number]  PCP Phone Number: [phone]  PCP Fax: [phone]    Encounter Date: 01/23/2017    Admit to: Inpatient Hospice    Vital Signs:  Routine    Diagnoses:     End Stage Diagnosis: End Stage Dementia    Active Hospital Problems    Diagnosis  POA    *Hyponatremia [E87.1]  No    CAP (community acquired pneumonia) [J18.9]  Yes    Aspiration pneumonia of left lower lobe due to gastric secretions [J69.0]  Yes    Generalized convulsive epilepsy with intractable epilepsy [G40.319]  Yes    Seizure [R56.9]  Yes    Seizures [R56.9]  Yes    Dyslipidemia [E78.5]  Yes    Hypothyroidism [E03.9]  Yes    Pre-diabetes [R73.03]  Yes    History of breast cancer [Z85.3]  Not Applicable    Dementia without behavioral disturbance [F03.90]  Yes      Resolved Hospital Problems    Diagnosis Date Resolved POA   No resolved problems to display.     Allergies:  Review of patient's allergies indicates:   Allergen Reactions    Codeine     Penicillins        Diet: pureed diet nectar thick    Activities: Activity as tolerated    Nursing: as per inpatient hospice protocol     Labs: none.      CONSULTS:    Speech Therapy to evaluate and treat for Swallowing.    MISCELLANEOUS CARE:  Routine Skin for Bedridden Patients: Apply moisture barrier cream to all skin folds and wet areas in perineal area daily and after baths and all bowel movements.    WOUND CARE ORDERS  None    Medications: Review discharge medications with patient and family and provide education.      Code Status: DNR    Current Discharge Medication List      START taking these medications    Details   docusate sodium (COLACE) 100 MG capsule Take 1 capsule (100 mg total) by mouth 2 (two) times daily.  Refills: 0      glycopyrrolate (ROBINUL) 1 mg Tab Take 1 tablet (1 mg total) by  mouth 3 (three) times daily.  Qty: 90 tablet, Refills: 0      guaifenesin 100 mg/5 ml (ROBITUSSIN) 100 mg/5 mL syrup Take 10 mLs (200 mg total) by mouth every 4 (four) hours.  Refills: 0      metronidazole (FLAGYL) 500 MG tablet Take 1 tablet (500 mg total) by mouth every 8 (eight) hours.  Qty: 21 tablet, Refills: 0      moxifloxacin (AVELOX) 400 mg tablet Take 1 tablet (400 mg total) by mouth once daily.  Qty: 7 tablet, Refills: 0      polyethylene glycol (GLYCOLAX) 17 gram PwPk Take 17 g by mouth once daily.  Refills: 0      senna (SENOKOT) 8.6 mg tablet Take 1 tablet by mouth once daily.         CONTINUE these medications which have NOT CHANGED    Details   aspirin (ECOTRIN) 81 MG EC tablet Take 81 mg by mouth once daily.      levothyroxine (SYNTHROID) 75 MCG tablet Take 1 tablet (75 mcg total) by mouth once daily.  Qty: 30 tablet, Refills: 11      pravastatin (PRAVACHOL) 20 MG tablet Take 1 tablet (20 mg total) by mouth once daily.  Qty: 30 tablet, Refills: 11         STOP taking these medications       levoFLOXacin (LEVAQUIN) 750 MG tablet Comments:   Reason for Stopping:                    _________________________________  Neptali Yepez MD  01/23/2017

## 2017-01-23 NOTE — PT/OT/SLP PROGRESS
Physical Therapy      Sue Capone  MRN: [id number]    Patient not seen today secondary to  (pt's son refused today,pt may be starting hospice  care soon). Will follow-up tomorrow.    Dipesh Phillips, PTA

## 2017-01-23 NOTE — PROGRESS NOTES
TN informed by MD that family is on board with in hospice   TN met with son Brown    [phone] and grandson  Juan   [phone].     TN spoke with Vladislav with [facility]     she will reach family and arrange for family meeting     high possiblity of pt being able to transfer to hospice today.

## 2017-01-23 NOTE — PLAN OF CARE
"Notified of bilateral UE swelling. Pt with history of bilateral edema, noted on 1/21 nursing note to be "General edema noted in addition to plus 4 to upper ext right arm greater than left." . Pt denying pain to the UE. With PIV in RUE. No IV meds/ fluids at this time. Exam revealed 1+ sacral edema to sacrum, no LE or signifcant facial edema. UE with 2+ radial pulse bilaterally, adequate cap refill, not TTP, warm to touch. 1/18 US of bilat UE with no evidence of acute DVT. Pt continuing to have BM, unknown UOP as pt with diapers. Further discussion with nursing revealed history of weeping at R arm day previously. As pt satting adequately on 2-3L NC (improvement from earlier 1/22) and without evidence of vascular compromise/ compartment syndrome, recommend elevation, wound care if weeping restarts. Discussed importance of neurovascular checks and careful watch for arm pain with nursing.     Abram Stephens, POLI-I  [facility]    "

## 2017-01-23 NOTE — DISCHARGE SUMMARY
[facility] Internal Medicine Discharge Summary    Primary Team: [facility] Internal Medicine Team B  Attending Physician: Hernan Patel MD  Resident: Alejandro  Intern: Bhumika    Date of Admit: 1/11/2017  Date of Discharge: 1/23/2017    Discharge to: Inpatient Hospice  Condition: Poor    Discharge Diagnoses     Patient Active Problem List   Diagnosis    Dementia without behavioral disturbance    History of breast cancer    Pre-diabetes    Hypothyroidism    Dyslipidemia    Seizure    Seizures    Hyponatremia    Generalized convulsive epilepsy with intractable epilepsy    CAP (community acquired pneumonia)    Aspiration pneumonia of left lower lobe due to gastric secretions       Consultants and Procedures     Consultants:  [facility] Neurology (Tyree)  Nephrology (Paz)  [facility] ([place])  [facility] Cardiology (Heriberto)  Wound Care (Bradley, MARIO ALBERTO)  Nutrition (SABINO Sanders)    Procedures:   EEG  Modified Barium Swallow Study    Brief History of Present Illness      89 yo F w/ PMH Pneumonia and UTI discharged 1 day ago from ED on Levofloxacin, hypothyroidism, HLD with complaint of seizure x1 day. Patient was in her usual state of health until (lives with son and daughter and law at home, requires assistance with all ADLs including bathing, transferring, toileting, uses diaper, does not walk uses wheelchair, require assitance from family for medications) On 1/9/16, she presented to her PCP for change in baseline mental status including increased confusion and sleepiness, was found to have a UTI and recommended to follow-up with a CXR. She sent home on Levofloxacin 250mg PO for a UTI and referral for CXR. On 1/10/17 patient followed-up with a CXR and was told she had a possible pneumonia and was told to come to the ED. In the ED, patient was told she did have an ED and was discharged home with a CURD 65 =1 with Levofloxacin 750mg PO. As per family, around 3pm they heard the patient screaming loud and was found  "shaking and drooling. Shaking episode lasted for about 30 secs, self-resolved. Patient did no lose bowels or bladder. Episode was followed by a period of confusion with slow recovery of mental status back to baseline. Family called EMS, and on arrival to ED, patient again had an episode of shaking and drooling that lasted less than 1min. Family states for the past 2 days, patient has been complaining of feeling "hot" and "cold." Review of systems could not be obtained due to patient being deaf and AMS.      For the full HPI please refer to the History & Physical from this admission.    Hospital Course By Problem with Pertinent Findings     Aspiration PNA  - At admission on exam, Lungs CTAB  - Patient has been afebrile.  - 1/17/17 WBC peaked at WBC 21. Currently, WBC wnl.  - 1/11/17 Procalcitonin <0.09  - Pt with choking episode 1/18/17. Likely another episode of aspiration.   - 1/19/17 CXR: near-complete opacification of the left hemithorax. Post obstructive vs pleural effusion.  - Discontinued Vancomycin / Moxifloxacin (de-escalated from Vanc/Aztreonam/Bactrim IV)  - As per ID ([place]) recommendations: continue Moxifloxacin / Flagyl  - As per family, consider comfort care, consider hospice. Continue PO Moxifloxacin / Flagyl total 7 days since 1/18/17 aspiration event      Generalized Tonic Clonic Seizures  - DDx includes meningitis/encephalitis vs hyponatremia  - Tonic clonic seizures x2 witnessed, no loss bowel/bladder, first self resolved, seizure in ED resolved after Lorazepam 1mg IV  - AMS, fever and chills x2 days. Sent home from ED on 1/10/17 with Levofloxacin 750mg PO for pneumonia (CURB 65 =1)  - Initial vitals, afebrile 98.2F, HR 90, /74, SaO2 95% RA  - At admission, WBC 15.2, afebrile. No nuchal rigidity.   - 1/12/17 Blood cx x2: no growth to date  - 1/11/17 LP results: Colorless, WBC 6, , Diff: 42% Neut, 53% Lymph, Glu 69, Prot 53  - 1/11/17 MRI Brain: Motion limited examination, no " intracranial mass lesion or acute pathology, mild chronic ischemic changes  - 1/12/17 EEG: static encephalopathy without focal changes nor an epileptic process   - 1/11/17 HSV CSF PCR: Negative  - Consult Neurology (Christian): MRI neg, consider MRI w/ epilespy protocol. EEG encephalopathic, but no epileptiform activity noted. Continue Vimpat  - As per ID ([place]): Patient given Acyclovir IV for meningitis/encephalitis, due to elevated RBC and protein in CSF. Therapy complete, total 7 days.  - Decreased dose of Vimpat to 50mg q12 due to sedating side effects. Will continue PO formulation.      Paroxysmal Atrial Fibrilation  - New onset as of 1/18/17 EKG  - CHADSVASC = 3. As per Cardiology recommendations (Heriberto) Will hold anti-coagulation due to patient is fall risk.  - Cardiology remmendations: considerstarting diltiazem. Held diltiazem for Afib 2/2 hypotension  - repeat EKG after fluid rehydration showed junctional rhythm replacing afib.  - 1/19/17 EKG: Sinus rhythm, back to baseline      Hypotension-resolved  - Likely secondary to poor PO intake, BPs improved overnight after IVF rehydration   - Will continue to monitor, give IVF prn, encourage diet      Hypovolemic Hyponatremia-resolved  - 1/13/17 Na 122, Urine Osm 500, Urine Na 72.  - Urine labs indicate possible SIADH vs RTA4. Ruled out hypothyroid (TSH/T4 wnl), Trona's (AM cortisol wnl)  - 1/15/17 CT Chest: L main bronchus, LLL & ELIAS mucus plug vs endobronchial lesion. Post obstructive atelectasis vs consolidation.  - As per nephrology recommendations, continue free water restrict, PO salt tabs, Lasix IV. Monitor q6hr BMP. No indication for 3% saline at this time. If no clinical improvement, consider Vaptan   - Prior to discharge Na 142.  - Continued to monitor BMP, titrate salt tabs, fluid restrict, continue lasix pushes as per nephrology, consider administering Vaptan in ICU if sodium decreases.      Somnolence  - Patient currently on lowest dose of  vimpat possible  - Neuro recommending addition of modafinil 100mg daily  - Will consider adding modafinil if mental status worsens      Non-Anion Gap Metabolic Acidosis (resolved)  - 1/14/17 HCO3- 15, resolved 1/22/17 HCO3- 27  - Stop labs due to comfort care      Normocytic Anemia  - At admission, H/H 11.2/33.5 MCV 83  - 1/11/17 Iron 44, Ferritin 115. B12 and Folate wnl  - Consider treatment with PO iron supplementation after acute illness.      Constipation  - As per family, no BM in 5 days  - In ED, patient disimpacted  - Continue miralax PRN, colace. Consider lactulose supp. May add/titrate meds as needed  - BM reported 1/12  - Continued colace, senna, miralax prn      Hypothyroidism  - 1/11/17 TSH: 2.219, FT4 1.18  - Continued home synthroid      HLD  - 1/3/17 Lipid Panel: TChol 153, LDL 71  - Continued home statin    Discharge Medications        Medication List      START taking these medications          docusate sodium 100 MG capsule   Commonly known as:  COLACE   Take 1 capsule (100 mg total) by mouth 2 (two) times daily.       glycopyrrolate 1 mg Tab   Commonly known as:  ROBINUL   Take 1 tablet (1 mg total) by mouth 3 (three) times daily.       guaifenesin 100 mg/5 ml 100 mg/5 mL syrup   Commonly known as:  ROBITUSSIN   Take 10 mLs (200 mg total) by mouth every 4 (four) hours.       metronidazole 500 MG tablet   Commonly known as:  FLAGYL   Take 1 tablet (500 mg total) by mouth every 8 (eight) hours.       moxifloxacin 400 mg tablet   Commonly known as:  AVELOX   Take 1 tablet (400 mg total) by mouth once daily.       polyethylene glycol 17 gram Pwpk   Commonly known as:  GLYCOLAX   Take 17 g by mouth once daily.       senna 8.6 mg tablet   Commonly known as:  SENOKOT   Take 1 tablet by mouth once daily.         CONTINUE taking these medications          aspirin 81 MG EC tablet   Commonly known as:  ECOTRIN       levothyroxine 75 MCG tablet   Commonly known as:  SYNTHROID   Take 1 tablet (75 mcg total) by  mouth once daily.       pravastatin 20 MG tablet   Commonly known as:  PRAVACHOL   Take 1 tablet (20 mg total) by mouth once daily.         STOP taking these medications          levoFLOXacin 750 MG tablet   Commonly known as:  LEVAQUIN            Where to Get Your Medications      Information about where to get these medications is not yet available     ! Ask your nurse or doctor about these medications     docusate sodium 100 MG capsule    glycopyrrolate 1 mg Tab    guaifenesin 100 mg/5 ml 100 mg/5 mL syrup    metronidazole 500 MG tablet    moxifloxacin 400 mg tablet    polyethylene glycol 17 gram Pwpk    senna 8.6 mg tablet             Discharge Information:   Diet:  Pureed, Nectar Thick Liquids    Physical Activity:  As tolerted    Instructions:  1. Take all medications as prescribed  2. Patient admitted to inpatient hospice    Follow-Up Appointments:  Follow-up Information     Follow up with [facility].    Specialties:  Hospice and Palliative Medicine, Physical Therapy, Occupational Therapy, Hospice Services    Why:  As needed    Contact information:    [address]  [address]  [facility] [place] [address]  [phone]              Ming Bai  [facility], -I      \

## 2017-01-23 NOTE — PLAN OF CARE
Problem: Patient Care Overview  Goal: Plan of Care Review  Outcome: Ongoing (interventions implemented as appropriate)  AAOX self; patient smiles when waved at; patient follows commands and responds to questions with head nodding or shaking. Respirations even and unlabored; breath sounds coarse throughout. O2 3L  in place with saturation 98%. Denies pain and n/v. B UE +4 pitting edema. Elevating upper extremities on pillows. Turning patient frequently in bed to prevent further sacral breakdown and using wedge; mepilex to sacrum removed d/t soiling and barrier cream applied. B LE with heel lift boots on. Sitter, hired by patient's family, at bedside to keep patient company. Instructed to call for assistance. Will cont to monitor.

## 2017-01-23 NOTE — PLAN OF CARE
for discharge to [facility] today   on 100% NRB  mask          01/23/17 1500   Transport Information   Transport Diagnosis (end stage dementia )   Transportation Date 01/23/17   Transported From [facility])   Transported To ([facility] )   Supporting Clinical Information   Unable to sit or travel in a seated position because Bed Confined (unable to ambulate/sit);Postural Instability;Non Weight Bearing Condition   Comment (100% nrb mask   )

## 2017-01-24 LAB
BACTERIA SPEC AEROBE CULT: NORMAL
GRAM STN SPEC: NORMAL

## 2017-01-24 NOTE — NURSING
Low air loss overlay processed for return 1/24/17 at 1758. Pt d/c yesterday evening. Confirmation # [id number]

## 2017-01-26 NOTE — PHYSICIAN QUERY
"PT Name: Sue Capone  MR #: [id number]  Physician Query Form - Nutrition Clarification   Reviewer  Ext     This form is a permanent document in the medical record.     Query Date: January 26, 2017  By submitting this query, we are merely seeking further clarification of documentation.. Please utilize your independent clinical judgment when addressing the question(s) below.    The Medical record reflects the following:   Indicators     Supporting Clinical Findings Location in Medical Record   x Wt/ BMI/ Usual Body Weight Height (inches): 62.01 in Weight (kg): 47.2 kg   BMI (kg/m2): 19.03    Anthropometrics    x Alb          TProt            Pre-Albumin ALBUMIN 3.6    Lab   x Acute or Chronic illness 89 yo female admitted with hyponateremia/seizures/pneumonia. PMH + for HTN, DM, thyroid disease, cholecystectomy.  H&P    % of estimated energy intake over a time frame from p.o., TF or TPN      Weight status over a time frame     x Subcutaneous fat and/or muscle loss Stage II to sacrum. Pt lethargic. Opens eyes to touch but cannot stay awake. Stage II to Right Buttock, Skin tear to Right Hand and Right 2nd Finger. Hemorrhagic blister to Left 3rd  finger. Bilateral extremities swollen overall. Heels intact. Patient turned to Right side using pillow prop following wound care.    loss of muscle mass  Consult 1/16    Reduced  strength      "Delayed wound healing:, "Failure to thrive"      "Fluid accumulation" or "Edema"      "Hypoalbuminemia"     x Other:  Consider adding ADA restrictions to diet & changing Boost Plus to Boost Glucose Control 2/2 pt hx DM.  Pt on pureed diet with nectar thick liquids & 1000 ml fluid restriction with Boost Plus tid    pt with poor po intake   Dietician Note     AND / ASPEN Clinical Characteristics (October 2011)  A minimum of two characteristics is recommended for diagnosing either moderate or severe malnutrition    Mild Malnutrition Moderate Malnutrition Severe Malnutrition    Energy " Intake from p.o., TF or TPN. < 75% intake of estimated energy needs for less than 7 days. < 75% intake of estimated energy needs for greater than 7 days. < 50% intake of estimated energy needs for > 5 days   Weight Loss 1-2% in 1 month  5% in 3 months  7.5% in 6 months  10% in one year 1-2 % in 1 week  5% in 1 month  7.5% in 3 months  10% in 6 months  20% in 1 year > 2% in 1 week  > 5% in 1 month  >7.5% in 3 months  >10% in 6 months  >20% in 1 year   Physical Findings None *Mild subcutaneous fat and/or muscle loss  *Mild fluid accumulation  *Stage II decubitus  *Surgical wound or non-healing wound *Mod subcutaneous fat and/or muscle loss  *Mod/severe fluid accumulation  *Stage III or IV decubitus  *Non-healing surgical wound     Provider, please specify the diagnosis or diagnoses associated with above clinical findings.                                [ ] Mild Protein-Calorie Malnutrition  [ ] Moderate Protein-Calorie Malnutrition  [ ] Severe Protein-CalorieMalnutrition  [ ] Cachexia  [ ] Anorexia  [ ] Other Nutritional Diagnosis (Specify) ____________________________________  [ x ] Clinically Undetermined    Please document in your progress notes daily for the duration of treatment, until resolved, and include in your discharge summary.

## 2017-01-26 NOTE — PHYSICIAN QUERY
"PT Name: Sue Capone  MR #: [id number]  Physician Query Form -Integumentary System Clarification     Reviewer  Ext     Aida [email or web address]        This form is a permanent document in the medical record.     Query Date: January 26, 2017  By submitting this query, we are merely seeking further clarification of documentation to reflect the severity of illness of your patient. Please utilize your independent clinical judgment when addressing the question(s) below.    The Medical record reflects the following:   Indicator   Supporting Clinical Findings Location in Medical Record    "Redness" documented     x "Decubitus" documented Consulted for Stage II to sacrum. Pt lethargic. Opens eyes to touch but cannot stay awake. Stage II to Right Buttock, Skin tear to Right Hand and Right 2nd Finger. Hemorrhagic blister to Left 3rd  finger. Bilateral extremities swollen overall. Heels intact. Patient turned to Right side using pillow prop following wound care.     Right buttock Stage II Pressure Injury 1 x 0.3 (cm). Pink, moist, and clean. Blanchable redness taco-wound. Applied saline gel/mepilex. Can remain in place up to 3 days if C/D/I. Change prn if soiled or damaged Consult 1/16    "Ulcer" documented      Wound care consult      Medication:      Treatment:      Other:      National Pressure Ulcer Advisory Panel (2007) Pressure Ulcer Definitions & Stages  Stage I:Intact skin with non-blanchable redness of a localized area usually over a bony prominence.  Stage II:Partial thickness loss of dermis presenting as a shallow open ulcer with a red pink wound bed, without slough.  Stage III:Full thickness tissue loss. Subcutaneous fat may be visible but bone, tendon or muscle is not exposed. Slough may be present but does not obscure the depth of tissue loss. May include undermining and tunneling.  Stage IV:Full thickness tissue loss with exposed bone, tendon or muscle. slough or eschar may be present on some parts of the " wound bed. Often include undermining and tunneling.   Unstageable:Full thickness tissue loss in which the base of the wound, the true depth, and therefore stage, cannot be determined.   Deep Tissue Injury: Purple or maroon localized area of discolored intact skin or blood-filled blister due to damage of underlying soft tissue from pressure and/or shear.     Doctor, Please specify the diagnosis or diagnoses associated with above clinical findings.    [ ] Decubitus (Pressure) Ulcer (Specify site, stage and Present on Admission (POA) status)  Site    Stage (see definitions)  POA      [  ] Ankle  [  ] Right  [  ] Left   [  ]Yes  [  ] No  [  ] Undeterminable    [  ] Buttock  [  ] Right  [  ] Left   [  ]Yes  [  ] No  [  ] Undeterminable    [  ] Coccyx  [  ] Right  [  ] Left   [  ]Yes  [  ] No  [  ] Undeterminable    [  ] Elbow   [  ] Right  [  ] Left   [  ]Yes  [  ] No  [  ] Undeterminable    [  ] Head     [  ]Yes  [  ] No  [  ] Undeterminable    [  ] Heel  [  ] Right  [  ] Left   [  ]Yes  [  ] No  [  ] Undeterminable    [  ] Hip  [  ] Right  [  ] Left   [  ]Yes  [  ] No  [  ] Undeterminable    [  ] Sacrum      [  ]Yes  [  ] No  [  ] Undeterminable    [  ] Shoulder blade  [  ] Right  [  ] Left   [  ]Yes  [  ] No  [  ] Undeterminable    [  ] Back  [  ] Right   [  ] Left  [  ] Upper   [  ] Low   [  ]Yes  [  ] No  [  ] Undeterminable    [  ] Other site (specify)     [  ]Yes  [  ] No  [  ] Undeterminable                                                                                                          [ ] Diabetic ulcer secondary to (Specify)  [  ] Neuropathy   [  ] PVD        [  ] Unspecified    [  ] Other/Specify   [  ]Skin ulcer secondary to (Specify)  [  ] Atherosclerotic vascular disease [  ] PVD   [  ] Unspecified  [  ] Other/Specify     [  ] Abscess (Specify site and organism if known)     [  ] Cellulitis (Specify site and organism if known)     [  ] Other (Specify)   [ x  ] Unable to determine

## 2017-02-22 PROBLEM — R41.82 ALTERED MENTAL STATUS: Status: ACTIVE | Noted: 2017-02-22

## 2017-02-22 PROBLEM — I48.0 PAROXYSMAL ATRIAL FIBRILLATION: Status: ACTIVE | Noted: 2017-02-22

## 2019-12-18 NOTE — PLAN OF CARE
1. What type of treatment are you seeking today? MH PHP   2. What symptoms are you having? Patient and patients outpatient provider is seeking treatment for patient for loss of appetite, lack of motivation, poor sleep, and racing thoughts. Problem: Fall Risk (Adult)  Goal: Absence of Falls  Patient will demonstrate the desired outcomes by discharge/transition of care.   Outcome: Ongoing (interventions implemented as appropriate)                  Problem: Patient Care Overview  Goal: Plan of Care Review  Outcome: Ongoing (interventions implemented as appropriate)  Pt is alert but confused, frequent reorientation provided. Family at bedside. Medications administered per MAR, PO meds crushed and given with applesauce, pt tolerated well. Pureed diet and nectar-thick liquids, family reports decreased appetite and that pt is not eating much. Soft wrist restraints maintained per order. Safety maintained, bed in lowest position, wheels locked, family instructed to call for assistance, call light within reach. Will continue to monitor.        Problem: Confusion, Chronic (Adult)  Goal: Cognitive/Functional Impairments Minimized  Patient will demonstrate the desired outcomes by discharge/transition of care.   Outcome: Ongoing (interventions implemented as appropriate)                  Problem: Restraint, Nonbehavioral (nonviolent)  Goal: Absence of Injury/Harm  Outcome: Ongoing (interventions implemented as appropriate)                  Problem: Infection, Risk/Actual (Adult)  Goal: Infection Prevention/Resolution  Patient will demonstrate the desired outcomes by discharge/transition of care.   Outcome: Ongoing (interventions implemented as appropriate)

## 2021-07-17 NOTE — PLAN OF CARE
Problem: Fall Risk (Adult)  Goal: Absence of Falls  Patient will demonstrate the desired outcomes by discharge/transition of care.   Outcome: Ongoing (interventions implemented as appropriate)                  Problem: Patient Care Overview  Goal: Plan of Care Review  Outcome: Ongoing (interventions implemented as appropriate)  Pt is awake and alert but disoriented. Frequent reorientation provided. Soft wrist restraints maintained. Rhonchi noted on auscultation. Bilateral arms edematous. Medications administered per MAR, po meds crushed and given with applesauce, pt tolerated well. Safety maintained, bed in lowest position, wheels locked, call light given to family, instructed to call for assistance. Will continue to monitor.        Problem: Pressure Ulcer Risk (Rell Scale) (Adult,Obstetrics,Pediatric)  Goal: Skin Integrity  Patient will demonstrate the desired outcomes by discharge/transition of care.   Outcome: Ongoing (interventions implemented as appropriate)                  Problem: Confusion, Chronic (Adult)  Goal: Cognitive/Functional Impairments Minimized  Patient will demonstrate the desired outcomes by discharge/transition of care.   Outcome: Ongoing (interventions implemented as appropriate)                  Problem: Infection, Risk/Actual (Adult)  Goal: Infection Prevention/Resolution  Patient will demonstrate the desired outcomes by discharge/transition of care.   Outcome: Ongoing (interventions implemented as appropriate)                     17-Apr-2021